# Patient Record
Sex: MALE | Race: BLACK OR AFRICAN AMERICAN | NOT HISPANIC OR LATINO | Employment: OTHER | URBAN - METROPOLITAN AREA
[De-identification: names, ages, dates, MRNs, and addresses within clinical notes are randomized per-mention and may not be internally consistent; named-entity substitution may affect disease eponyms.]

---

## 2023-08-17 ENCOUNTER — HOSPITAL ENCOUNTER (OUTPATIENT)
Dept: DATA CONVERSION | Facility: HOSPITAL | Age: 78
End: 2023-08-18
Attending: INTERNAL MEDICINE | Admitting: INTERNAL MEDICINE

## 2023-08-17 ENCOUNTER — APPOINTMENT (OUTPATIENT)
Dept: LAB | Facility: LAB | Age: 78
End: 2023-08-17
Payer: MEDICARE

## 2023-08-17 DIAGNOSIS — F10.90 ALCOHOL USE, UNSPECIFIED, UNCOMPLICATED: ICD-10-CM

## 2023-08-17 DIAGNOSIS — N40.0 BENIGN PROSTATIC HYPERPLASIA WITHOUT LOWER URINARY TRACT SYMPTOMS: ICD-10-CM

## 2023-08-17 DIAGNOSIS — N18.9 CHRONIC KIDNEY DISEASE, UNSPECIFIED: ICD-10-CM

## 2023-08-17 DIAGNOSIS — D63.1 ANEMIA IN CHRONIC KIDNEY DISEASE (CODE): ICD-10-CM

## 2023-08-17 DIAGNOSIS — C34.12 MALIGNANT NEOPLASM OF UPPER LOBE, LEFT BRONCHUS OR LUNG (MULTI): ICD-10-CM

## 2023-08-17 DIAGNOSIS — J44.9 CHRONIC OBSTRUCTIVE PULMONARY DISEASE, UNSPECIFIED (MULTI): ICD-10-CM

## 2023-08-17 DIAGNOSIS — I12.9 HYPERTENSIVE CHRONIC KIDNEY DISEASE WITH STAGE 1 THROUGH STAGE 4 CHRONIC KIDNEY DISEASE, OR UNSPECIFIED CHRONIC KIDNEY DISEASE: ICD-10-CM

## 2023-08-17 DIAGNOSIS — F14.90 COCAINE USE, UNSPECIFIED, UNCOMPLICATED: ICD-10-CM

## 2023-08-17 DIAGNOSIS — R91.8 OTHER NONSPECIFIC ABNORMAL FINDING OF LUNG FIELD: ICD-10-CM

## 2023-08-17 DIAGNOSIS — Z72.0 TOBACCO USE: ICD-10-CM

## 2023-08-17 DIAGNOSIS — D50.9 IRON DEFICIENCY ANEMIA, UNSPECIFIED: ICD-10-CM

## 2023-08-17 LAB
ANION GAP IN SER/PLAS: 10 MMOL/L (ref 10–20)
BASOPHILS (10*3/UL) IN BLOOD BY MANUAL COUNT - WAM: 0.23 X10E9/L (ref 0–0.1)
BASOPHILS/100 LEUKOCYTES IN BLOOD BY MANUAL COUNT - WAM: 3.5 % (ref 0–2)
CALCIUM (MG/DL) IN SER/PLAS: 8.5 MG/DL (ref 8.6–10.6)
CARBON DIOXIDE, TOTAL (MMOL/L) IN SER/PLAS: 27 MMOL/L (ref 21–32)
CHLORIDE (MMOL/L) IN SER/PLAS: 109 MMOL/L (ref 98–107)
CREATININE (MG/DL) IN SER/PLAS: 1.67 MG/DL (ref 0.5–1.3)
EOSINOPHILS (10*3/UL) IN BLOOD BY MANUAL COUNT - WAM: 0.23 X10E9/L (ref 0–0.4)
EOSINOPHILS/100 LEUKOCYTES IN BLOOD BY MANUAL COUNT - WAM: 3.5 % (ref 0–6)
ERYTHROCYTE DISTRIBUTION WIDTH (RATIO) BY AUTOMATED COUNT: 24.3 % (ref 11.5–14.5)
ERYTHROCYTE MEAN CORPUSCULAR HEMOGLOBIN CONCENTRATION (G/DL) BY AUTOMATED: 27.2 G/DL (ref 32–36)
ERYTHROCYTE MEAN CORPUSCULAR VOLUME (FL) BY AUTOMATED COUNT: 69 FL (ref 80–100)
ERYTHROCYTES (10*6/UL) IN BLOOD BY AUTOMATED COUNT: 3.8 X10E12/L (ref 4.5–5.9)
GFR MALE: 42 ML/MIN/1.73M2
GLUCOSE (MG/DL) IN SER/PLAS: 84 MG/DL (ref 74–99)
HEMATOCRIT (%) IN BLOOD BY AUTOMATED COUNT: 26.1 % (ref 41–52)
HEMOGLOBIN (G/DL) IN BLOOD: 7.1 G/DL (ref 13.5–17.5)
HYPOCHROMIA (PRESENCE) IN BLOOD BY LIGHT MICROSCOPY: NORMAL
IMMATURE GRANULOCYTES/100 LEUKOCYTES IN BLOOD BY AUTOMATED COUNT: 0.3 % (ref 0–0.9)
INR IN PPP BY COAGULATION ASSAY: 1.1 (ref 0.9–1.1)
LEUKOCYTES (10*3/UL) IN BLOOD BY AUTOMATED COUNT: 6.5 X10E9/L (ref 4.4–11.3)
LYMPHOCYTES (10*3/UL) IN BLOOD BY MANUAL COUNT - WAM: 0.68 X10E9/L (ref 0.8–3)
LYMPHOCYTES/100 LEUKOCYTES IN BLOOD BY MANUAL COUNT - WAM: 10.5 % (ref 13–44)
MANUAL DIFFERENTIAL Y/N: ABNORMAL
MONOCYTES (10*3/UL) IN BLOOD BY MANUAL COUNT - WAM: 0.34 X10E9/L (ref 0.05–0.8)
MONOCYTES/100 LEUKOCYTES IN BLOOD BY MANUAL COUNT - WAM: 5.3 % (ref 2–10)
NEUTROPHILS (SEGS+BANDS) (10*3/UL) MANUAL COUNT - WAM: 5.02 X10E9/L (ref 1.6–5.5)
NRBC (PER 100 WBCS) BY AUTOMATED COUNT: 0 /100 WBC (ref 0–0)
PLATELETS (10*3/UL) IN BLOOD AUTOMATED COUNT: 461 X10E9/L (ref 150–450)
POCT GLUCOSE: 62 MG/DL (ref 74–99)
POCT GLUCOSE: 94 MG/DL (ref 74–99)
POLYCHROMASIA IN BLOOD BY LIGHT MICROSCOPY: NORMAL
POTASSIUM (MMOL/L) IN SER/PLAS: 4.5 MMOL/L (ref 3.5–5.3)
PROTHROMBIN TIME (PT) IN PPP BY COAGULATION ASSAY: 12.8 SEC (ref 9.8–12.8)
RBC MORPHOLOGY IN BLOOD: NORMAL
SCHISTOCYTES (PRESENCE) IN BLOOD BY LIGHT MICROSCOPY: NORMAL
SEGMENTED NEUTROPHILS (10*3/UL) BLOOD MANUAL - WAM: 5.02 X10E9/L (ref 1.6–5)
SEGMENTED NEUTROPHILS/100 LEUKOCYTES BY MANUAL COUNT -: 77.2 % (ref 40–80)
SODIUM (MMOL/L) IN SER/PLAS: 141 MMOL/L (ref 136–145)
TARGET CELLS IN BLOOD BY LIGHT MICROSCOPY: NORMAL
UREA NITROGEN (MG/DL) IN SER/PLAS: 17 MG/DL (ref 6–23)

## 2023-08-18 LAB
ABO GROUP (TYPE) IN BLOOD: NORMAL
ALBUMIN (G/DL) IN SER/PLAS: 2.9 G/DL (ref 3.4–5)
ANION GAP IN SER/PLAS: 13 MMOL/L (ref 10–20)
ANTIBODY SCREEN: NORMAL
BASOPHILS (10*3/UL) IN BLOOD BY AUTOMATED COUNT: 0.07 X10E9/L (ref 0–0.1)
BASOPHILS/100 LEUKOCYTES IN BLOOD BY AUTOMATED COUNT: 0.8 % (ref 0–2)
CALCIUM (MG/DL) IN SER/PLAS: 8.7 MG/DL (ref 8.6–10.6)
CARBON DIOXIDE, TOTAL (MMOL/L) IN SER/PLAS: 24 MMOL/L (ref 21–32)
CHLORIDE (MMOL/L) IN SER/PLAS: 107 MMOL/L (ref 98–107)
CREATININE (MG/DL) IN SER/PLAS: 1.69 MG/DL (ref 0.5–1.3)
EOSINOPHILS (10*3/UL) IN BLOOD BY AUTOMATED COUNT: 0.14 X10E9/L (ref 0–0.4)
EOSINOPHILS/100 LEUKOCYTES IN BLOOD BY AUTOMATED COUNT: 1.6 % (ref 0–6)
ERYTHROCYTE DISTRIBUTION WIDTH (RATIO) BY AUTOMATED COUNT: 24.3 % (ref 11.5–14.5)
ERYTHROCYTE MEAN CORPUSCULAR HEMOGLOBIN CONCENTRATION (G/DL) BY AUTOMATED: 27.2 G/DL (ref 32–36)
ERYTHROCYTE MEAN CORPUSCULAR VOLUME (FL) BY AUTOMATED COUNT: 67 FL (ref 80–100)
ERYTHROCYTES (10*6/UL) IN BLOOD BY AUTOMATED COUNT: 3.95 X10E12/L (ref 4.5–5.9)
GFR MALE: 42 ML/MIN/1.73M2
GLUCOSE (MG/DL) IN SER/PLAS: 126 MG/DL (ref 74–99)
HEMATOCRIT (%) IN BLOOD BY AUTOMATED COUNT: 26.5 % (ref 41–52)
HEMOGLOBIN (G/DL) IN BLOOD: 7.2 G/DL (ref 13.5–17.5)
HYPOCHROMIA (PRESENCE) IN BLOOD BY LIGHT MICROSCOPY: NORMAL
IMMATURE GRANULOCYTES/100 LEUKOCYTES IN BLOOD BY AUTOMATED COUNT: 0.3 % (ref 0–0.9)
LEUKOCYTES (10*3/UL) IN BLOOD BY AUTOMATED COUNT: 8.8 X10E9/L (ref 4.4–11.3)
LYMPHOCYTES (10*3/UL) IN BLOOD BY AUTOMATED COUNT: 0.77 X10E9/L (ref 0.8–3)
LYMPHOCYTES/100 LEUKOCYTES IN BLOOD BY AUTOMATED COUNT: 8.8 % (ref 13–44)
MAGNESIUM (MG/DL) IN SER/PLAS: 1.94 MG/DL (ref 1.6–2.4)
MONOCYTES (10*3/UL) IN BLOOD BY AUTOMATED COUNT: 0.6 X10E9/L (ref 0.05–0.8)
MONOCYTES/100 LEUKOCYTES IN BLOOD BY AUTOMATED COUNT: 6.9 % (ref 2–10)
NEUTROPHILS (10*3/UL) IN BLOOD BY AUTOMATED COUNT: 7.14 X10E9/L (ref 1.6–5.5)
NEUTROPHILS/100 LEUKOCYTES IN BLOOD BY AUTOMATED COUNT: 81.6 % (ref 40–80)
NRBC (PER 100 WBCS) BY AUTOMATED COUNT: 0 /100 WBC (ref 0–0)
PHOSPHATE (MG/DL) IN SER/PLAS: 3.1 MG/DL (ref 2.5–4.9)
PLATELETS (10*3/UL) IN BLOOD AUTOMATED COUNT: 455 X10E9/L (ref 150–450)
POTASSIUM (MMOL/L) IN SER/PLAS: 4 MMOL/L (ref 3.5–5.3)
RBC MORPHOLOGY IN BLOOD: NORMAL
RH FACTOR: NORMAL
SCHISTOCYTES (PRESENCE) IN BLOOD BY LIGHT MICROSCOPY: NORMAL
SODIUM (MMOL/L) IN SER/PLAS: 140 MMOL/L (ref 136–145)
TARGET CELLS IN BLOOD BY LIGHT MICROSCOPY: NORMAL
UREA NITROGEN (MG/DL) IN SER/PLAS: 18 MG/DL (ref 6–23)

## 2023-08-22 LAB
COMPLETE PATHOLOGY REPORT: NORMAL
COMPLETE PATHOLOGY REPORT: NORMAL
CONVERTED ADDENDUM DIAGNOSIS 2: NORMAL
CONVERTED ADDENDUM DIAGNOSIS 2: NORMAL
CONVERTED CLINICAL DIAGNOSIS-HISTORY: NORMAL
CONVERTED CLINICAL DIAGNOSIS-HISTORY: NORMAL
CONVERTED DIAGNOSIS COMMENT: NORMAL
CONVERTED FINAL DIAGNOSIS: NORMAL
CONVERTED FINAL DIAGNOSIS: NORMAL
CONVERTED FINAL REPORT PDF LINK TO COPY AND PASTE: NORMAL
CONVERTED FINAL REPORT PDF LINK TO COPY AND PASTE: NORMAL
CONVERTED GROSS DESCRIPTION: NORMAL
CONVERTED RAPID EVALUATION: NORMAL
CONVERTED SPECIMEN DESCRIPTION: NORMAL

## 2023-09-29 VITALS — BODY MASS INDEX: 22.68 KG/M2 | WEIGHT: 141.09 LBS | HEIGHT: 66 IN

## 2023-09-30 NOTE — H&P
History of Present Illness:   HPI:    Mr. Aquino is a 76 yo M with a h/o left upper lobe mass,  PE, COPD, CKD, duodenal AV malformation, GI bleed, polysubstance use disorder (etoh, tobacco, cocaine), JOSÉ MIGUEL, HTN, BPH who is  admitted post EBUS for observation, repeat labs, and social work consult . Pt presented to ED in July 2023 for abdominal pain and several weeks of dyspnea on exertion with intermittent wheezing. CT Chest done at the time showed a MESHA mass, and patient underwent CT guided  biopsy which showed small portion of fibroelastotic nodule with focal necrosis and chronic inflammation, admixed with blood. Pt had an EBUS today given concern for malignancy.    On exam pt  mostly complaining of being hungry stating he hasn't eaten in a few days. When asked about this he stated he doesn't have any food. Pt agreeable to social work consult. He denies fevers, chills, weight loss, dyspnea, cp, cough, headache, lightheadedness,  n/v, abd pain, constipation, diarrhea, dysuria, joint pain. He is not aware of any of his past medical history and doesn't follow with any PCP or take any medications. Pt is aware that there is something in his chest that needed to be analyzed but that  seems to be the extent of his understanding. He did not appear altered and was AOx3.    PMH:  Pt unaware of any medical history  PMH in HPI taken from EMR    PSH:  None    Fam Hx:  None that pt is aware of    Medications:   Pt reports not taking any medications except inhaler  Per EMR patient on:  Ferrous sulfate 325 qd  Albuterol 90mcg  q6h  Flomax 0.4mg po qd  amlodipine 5mg qd  pantoprazole 40mg qd      Allergies:  NKDA    Social:  - Currently smokes a few cigarettes/day, occasional cocaine use (last used a week ago), denies alcohol use  - Lives with cousins,  nephew      On Admission:  T 36.4   /84  HR  96   RR 17    O2 95% on RA   Labs  CBC: WBC 6.5, Hgb 7.1, plt 461  RFP: Na 141, K 4.5, Cl 109, HCO3 27, BUN 17, Cr 1.67,  gluc 84, Ca 8.5    Comorbidities:   Comorbidites:  ·  Comorbid Conditions hypertension            Allergies:  ·  No Known Allergies :     Medications Prior to Admission:   No Home Meds have been entered for reconciliation yet.    Objective:     Objective Information:        Pain reported at 8/17 14:56: 0 = None    Physical Exam Narrative:  ·  Physical Exam:    General: Sitting on side of bed holding cane, making grunting sounds  HEENT: Normocephalic, atraumatic, EOMI grossly  Respiratory: Coarse breath sounds on expiration with some expiratory wheezing, normal work of breathing on RA  CV: RRR, no m/r/g  Abdominal: Soft, non-tender, non-distended, +BS  Extremities: No peripheral edema b/l, warm and well perfused  NEURO: AOx3, no tremors noted  PSYCH: Appropriate mood and affect      Recent Lab Results:    Results:    CBC: 8/17/2023 08:22              \     Hgb     /                              \     7.1 L    /  WBC  ----------------  Plt               6.5       ----------------    461 H            /     Hct     \                              /     26.1 L    \            RBC: 3.80 L    MCV: 69 L          BMP: 8/17/2023 08:22  NA+        Cl-     BUN  /                         141    109 H   17  /  --------------------------------  Glucose                ---------------------------  84    K+     HCO3-   Creat \                         4.5  27    1.67 H \  Calcium : 8.5 L    Anion Gap : 10      Coagulation: 8/17/2023 08:22  PT  /                    12.8  /  -------<    INR          ----------<      1.1  PTT\                              \                         I have reviewed these laboratory results:    RBC Morphology  17-Aug-2023 08:22:00      Result Value    Red Blood Cell Morphology  See Below    Polychromasia  Few    Hypochromasia  Few    Red Blood Cell Fragments  Few    Target Cells  Few      Manual Differential Panel  17-Aug-2023 08:22:00      Result Value    % Seg Neutrophil  77.2    % Lymphocyte  10.5     % Monocyte  5.3    % Eosinophil  3.5    % Basophil  3.5    Absolute Neutrophil Count (ANC)  5.02    Seg Neutrophil Count  5.02   H   Lymphocyte, Count  0.68   L   Monocyte, Count  0.34    Eosinophil, Count  0.23    Basophil, Count  0.23   H       Radiology Results:    Results:        Impression:    1. No evidence of acute pulmonary embolism. Rounded masslike soft  tissue in the left upper lobe in the lingular segment measuring 9 x  7.1 cm with hypodensity seen centrally. This has some intrinsic mass  effect on the adjacent pulmonary artery. Findings concerning for  malignancy. PET-CT and pulmonary consultation advised. Necrotic  pneumonia is felt to be less likely. Vascular calcifications detected  2. Slight emphysema.      TH CT Angio Chest for PE [Jul 2 2023  8:45PM]      Assessment and Plan:   Assessment:    Mr. Aquino is a  76 yo M with a h/o left upper lobe mass, PE, COPD,  CKD, duodenal AV malformation, GI bleed, polysubstance use disorder (etoh, tobacco, cocaine), JOSÉ MIGUEL, HTN, BPH who is admitted post  EBUS for observation, repeat labs, and social work consult.    #Left lung mass  :: CT A Chest (7/2/23): Rounded masslike soft tissue in MESHA lingular segment- 9 x7.1cm with hypodensity with intrinsic mass effect on adjacent  PA, slight emphysema  :: S/p EBUS on 8/17: Completely obstructing airway abnormality in the superior lingular segment of the left upper lobe (B4) and in the inferior lingular segment of the left upper lobe, Extrinsic compression of the left lower  lobe, this was traversable with a  bronchoscope, the distal segments of the left lower lobe airways were able to be visualized. Preliminary cytology was suggestive suggestive of benign-appearing lymphoid tissue in node level 7 and suggestive of malignancy  in left upper lobe mass (final results are pending)  :: Pulmonologist- Dr. Mora  - F/u outpatient with pulm re biopsy results  - Continue albuterol    #COPD  :: Pt on albuterol at  home  - Continue albuterol  #CKD  :: Baseline Cr 2, currently 1.67  - F/u AM RFP    #Iron deficiency anemia  :: Hgb 7.1, MCV 69  :: Consent for blood transfusion in chart  :: Iron studies (July 2023): Fe 11, TIBC 369, %sat 3, ferritin 15 -consistent with JOSÉ MIGUEL  - Complete T&S  - F/u AM CBC, transfuse for hgb  <7    #HTN   :: Per EMR on amlodipine 5mg po daily at home  - Continue amlodipine 5mg qd    #BPH   :: Per EMR on Flomax 0.4mg qd  - Continue Flomax 0.4mg qd    #Polysubstance use disorder  :: H/o cocaine, etoh, tobacco use. Last used cocaine  a week ago, pt denies alcohol use  - F/u utox, serum tox   - Pt does not appear to be in withdrawal-denies anxiety, palpitations,  n/v, visual changes, no tremors. Will initiate CIWA if this changes    #Social  :: Pt here for bronch, did not have ride home so was admitted  - Social work consult        Diet: Renal  DVT PPX: ambulation, SCD, holding AC   GI ppx: pantoprazole  Code: Full      Will be staffed with attending in the AM.  Felicity Vargas MD  PGY-1, Internal Medicine          Attestation:   Note Completion:  I am a:  Resident/Fellow   Attending Attestation I saw and evaluated the patient.  I personally obtained the key and critical portions of the history and physical exam or was physically present for key and  critical portions performed by the resident/fellow. I reviewed the resident/fellow?s documentation and discussed the patient with the resident/fellow.  I agree with the resident/fellow?s medical decision making as documented in the note.     I personally evaluated the patient on 18-Aug-2023   Comments/ Additional Findings    Patient seen on 8/18. Will start stiolto for moderate COPD on old PFTs. Will need OP follow up with PCP for BP management and pulm vs PCP for COPD.   Interventional pulm to call with final results of biopsy. Appreciate social work consult today.          Electronic Signatures:  Felicity Dennis (MD (Resident))  (Signed  18-Aug-2023 10:45)   Authored: History of Present Illness, Comorbidities,  Allergies, Medications Prior to Admission, Objective, Assessment and Plan, Note Completion  Katie Nettles)  (Signed 18-Aug-2023 12:03)   Authored: Note Completion   Co-Signer: Assessment and Plan, Note Completion      Last Updated: 18-Aug-2023 12:03 by Katie Nettles)

## 2023-09-30 NOTE — DISCHARGE SUMMARY
Send Summary:   Discharge Summary Providers:  Provider Role Provider Name   · Referring Unknown, Pcp   · Attending Katie Nettles   · Primary Unknown, Pcp       Note Recipients: Uriah Ricardo MD       Discharge:    Summary:   Admission Date: .17-Aug-2023 08:27:00   Discharge Date: 18-Aug-2023   Attending Physician at Discharge: Katie Nettles   Admission Reason: Post-bronchoscopy, lung mass   Final Discharge Diagnoses: Lung mass   Procedures: Bronchoscopy Aug 17, 2023   Condition at Discharge: Fair   Disposition at Discharge: .Home   Vital Signs:        T   P  R  BP   MAP  SpO2   Value  37.1  104  16  135/65   88  100%  Date/Time 8/18 14:03 8/18 14:03 8/18 14:03 8/18 14:03  8/18 14:03 8/18 14:03  Range  (36.4C - 37.1C )  (83 - 104 )  (16 - 18 )  (135 - 168 )/ (65 - 86 )  (88 - 106 )  (95% - 100% )  Highest temp of 37.1 C was recorded at 8/18 14:03    Date:            Weight/Scale Type:  Height:   17-Aug-2023 17:32  64  kg / bed  167.5  cm  Physical Exam:    General: Laying in bed, NAD  HEENT: Normocephalic, atraumatic, EOMI grossly  Respiratory: Coarse breath sounds on expiration with some inspiratory and expiratory wheezing, normal work of breathing on RA  CV: RRR, no m/r/g  Abdominal: Soft, non-tender, non-distended, +BS  Extremities: R forearm swollen, mildly tender to touch, no erythema or ecchymoses. No peripheral edema in LE b/l, warm and well perfused  NEURO: AOx3, no tremors noted  PSYCH: Appropriate mood and affect  Hospital Course:    Mr. Aquino is a 76 yo M with a h/o left upper lobe mass, PE, COPD, CKD, duodenal AV malformation, GI bleed, polysubstance use disorder (etoh, tobacco, cocaine),  JOSÉ MIGUEL, HTN, BPH who was admitted post EBUS for observation overnight, repeat labs, and social work consult. Pt presented to ED in July 2023 for abdominal pain and several weeks of dyspnea on exertion with intermittent wheezing. CT Chest done at the time  showed a MESHA mass, and patient underwent CT  guided biopsy which showed small portion of fibroelastotic nodule with focal necrosis and chronic inflammation, admixed with blood. Pt had an EBUS 8/17 given concern for malignancy. Pt remained HDS, with stable  labs overnight and was discharged the day after EBUS with biopsy. Attempted to establish home health care for patient for medication compliance and social work, but he he does not follow with a PCP, so no one available to manage home care orders.     Follow up:  - Primary care doctor after one week from discharge - appointment requested  - Dr. Ricardo after 2 weeks from discharge to follow up with the lung mass biopsy     -New med: Stiolto once a day       Discharge Information:    and Continuing Care:   Lab Results - Pending:    Surgical Pathology Drawn at 17-Aug-2023 13:00:00  Cytology-Non GYN Drawn at 17-Aug-2023 00:00:00  Radiology Results - Pending: None   Discharge Instructions:    Activity:           activity as tolerated.    Nutrition/Diet:           resume normal diet    Additional Orders:           Additional Instructions:   Dear Mr. Aquino,     you have been admitted for a bronchoscopy procedure for a biopsy of the left lung mass that was diagnosed on previous imaging. The procedure went successfully w/o complications, a biopsy was collected and the result is still pending. Once the result of  tyhe biopsy is ready, you will be contacted by our staff to update you regarding the biopsy result and the next step you need to take for management and follow up.  Since you have COPD, we added a new inhaler to your regimen, Called Stiolto which you  should use every day giving yourself two puffs. You remained stable during the hospital course and you will be discharged home today.     Unfortunately, your shoes were misplaced during your stay here at the hospital. The patient advocate has been contacted regarding reimbursement. Their number is [phone] if you would like to speak to the patient  advicate yourself.     After discharge instructions:     -Continue using your home albuterol as needed   -Start using the new inhaler, Stiolto once a day (giving yourself 2 puffs)     Appointment after discharge:   -Follow up with your primary care doctor after one week from discharge   -Follow up with your pulmonologist after 2 weeks from discharge to follow up with the lung mass biopsy       It was a pleasure looking after you. take care !     Home Care Services:           Home Care Skilled Service:   medication,  new diagnosis teaching,  social work: support and transport    Follow Up Appointments:    Follow-Up Appointment 01:           Physician/Dept/Service:   Primary care doctor          Call to Schedule in:   1 week    Follow-Up Appointment 02:           Physician/Dept/Service:   Pulmonology - Dr Vinay Simmons          Call to Schedule in:   2 weeks          Location:   [facility]    Discharge Medications: Home Medication   Flomax 0.4 mg oral capsule - 1 cap(s) orally once a day (at bedtime) -.Meds to Beds   Discharge pending.   albuterol 90 mcg/inh inhalation aerosol - 2  inhaled every 6 hours -.Meds to Beds  as need for SOB.   Discharge pending.   ferrous sulfate 325 mg (65 mg elemental iron) oral tablet - 1 tab(s) orally once a day -.Meds to Beds   Protonix 40 mg oral delayed release tablet - 1 tab(s) orally once a day  fixed wheeled walker  - Diagnosis: COPD   iagnosis code J44.99    height: 170 cm   weight: 70 KG.   tiotropium-olodaterol 2.5 mcg-2.5 mcg/inh inhalation aerosol - 2 puff(s) inhaled once a day     Meds to beds   [facility] 55 RM [id number]   [facility] 8/18      PRN Medication   naloxone - 0.2 milligram(s)  once, As needed, If patient RR below 10, obtunded or unarousable - to IntraVenous Push     DNR Status:   ·  Code Status Code Status order at time of discharge: Full Code     Attestation:   Note Completion:  I am a:  Resident/Fellow   Attending Attestation I saw and evaluated the patient.  I personally  obtained the key and critical portions of the history and physical exam or was physically present for key and  critical portions performed by the resident/fellow. I reviewed the resident/fellow?s documentation and discussed the patient with the resident/fellow.  I agree with the resident/fellow?s medical decision making as documented in the note.     I personally evaluated the patient on 18-Aug-2023         Electronic Signatures:  Lynne Almaguer (Resident))  (Signed 18-Aug-2023 16:53)   Authored: Send Summary, Summary Content, Ongoing Care,  DNR Status, Note Completion  Katie Nettles)  (Signed 19-Aug-2023 12:28)   Authored: Note Completion   Co-Signer: Send Summary, Summary Content, Ongoing Care, DNR Status, Note Completion      Last Updated: 19-Aug-2023 12:28 by Katie Nettles)

## 2023-09-30 NOTE — PROGRESS NOTES
Service: Pulmonology     Subjective Data:   SYBIL AQUINO is a 77 year old Male who is Hospital Day # 2.    Additional Information:    Overnight Mr. Aquino's R PIV infiltrated, forearm now swollen and slightly tender to touch. Reports doing well this AM. Concerned about missing shoes, last seen  when changing for bronch. Pt denies headache, dyspnea, n/v, cough, cp.        Objective Data:     Objective Information:      T   P  R  BP   MAP  SpO2   Value  36.6  83  17  142/86   105  100%  Date/Time 8/18 5:22 8/18 5:22 8/18 5:22 8/18 5:22 8/18 5:22 8/18 5:22  Range  (36.4C - 36.6C )  (83 - 98 )  (17 - 18 )  (142 - 168 )/ (81 - 86 )  (105 - 106 )  (95% - 100% )      Pain reported at 8/18 0:24: 0 = None    Physical Exam Narrative:  ·  Physical Exam:    General: Laying in bed, NAD  HEENT: Normocephalic, atraumatic, EOMI grossly  Respiratory: Coarse breath sounds on expiration with some inspiratory and expiratory wheezing, normal work of breathing on RA  CV: RRR, no m/r/g  Abdominal: Soft, non-tender, non-distended, +BS  Extremities: R forearm swollen, mildly tender to touch, no erythema or ecchymoses. No peripheral edema in LE b/l, warm and well perfused  NEURO: AOx3, no tremors noted  PSYCH: Appropriate mood and affect    Recent Lab Results:    Results:        I have reviewed these laboratory results:    Complete Blood Count + Differential  18-Aug-2023 06:04:00      Result Value    White Blood Cell Count  8.8    Nucleated Erythrocyte Count  0.0    Red Blood Cell Count  3.95   L   HGB  7.2   L   HCT  26.5   L   MCV  67   L   MCHC  27.2   L   PLT  455   H   RDW-CV  24.3   H   Neutrophil %  81.6    Immature Granulocytes %  0.3    Lymphocyte %  8.8    Monocyte %  6.9    Eosinophil %  1.6    Basophil %  0.8    Neutrophil Count  7.14   H   Lymphocyte Count  0.77   L   Monocyte Count  0.60    Eosinophil Count  0.14    Basophil Count  0.07      Renal Function Panel  18-Aug-2023 06:04:00      Result Value    Glucose,   call to report that pt is being reviewed by [place], possibly [place].  Will discuss further DEC assessment need with nurse manager.  Pt sitting calm and quiet in room at this time. Frequently asking what is going on and what we have found out.  Pt found without oxygen frequently, needing assistance to re place O2.   Serum  126   H   NA  140    K  4.0    CL  107    Bicarbonate, Serum  24    Anion Gap, Serum  13    BUN  18    CREAT  1.69   H   GFR Male  42   A   Calcium, Serum  8.7    Phosphorus, Serum  3.1    ALB  2.9   L     RBC Morphology  18-Aug-2023 06:04:00      Result Value    Red Blood Cell Morphology  See Below    Hypochromasia  Marked    Red Blood Cell Fragments  Few    Target Cells  Few      Magnesium, Serum  18-Aug-2023 06:04:00      Result Value    Magnesium, Serum  1.94        Assessment and Plan:   Comorbidities:  ·  Comorbidity anemia   ·  Anemia likely JOSÉ MIGUEL - not yet characterized     Code Status:  ·  Code Status Full Code     Assessment:    Mr. Aquino is a  76 yo M with a h/o left upper lobe mass, PE, COPD,  CKD, duodenal AV malformation, GI bleed, polysubstance use disorder (etoh, tobacco, cocaine), JOSÉ MIGUEL, HTN, BPH who is admitted post  EBUS for observation, repeat labs, and social work consult.    Updates 8/18:  - AM Hgb stable at 7.2, no transfusion indicated  - Social work consult  - Plan for discharge today    #Left lung mass  :: CT A Chest (7/2/23): Rounded masslike soft tissue in MESHA lingular segment- 9 x7.1cm with hypodensity with intrinsic mass effect on adjacent  PA, slight emphysema  :: CT guided lung biopsy (7/3):  fibroelastotic nodule with focal necrosis and chronic inflammation, admixed with blood  :: S/p EBUS on 8/17: Completely obstructing airway abnormality  in the superior lingular segment of the left upper lobe (B4) and in the inferior lingular segment of the left upper lobe, Extrinsic compression of the left lower lobe, this was traversable with a  bronchoscope, the distal segments of the left lower  lobe airways were able to be visualized. Preliminary cytology was suggestive suggestive of benign-appearing lymphoid tissue in node level 7 and suggestive of malignancy in left upper lobe mass (final results are pending)  :: Pulmonologist- Dr. Mora   - F/u outpatient with pulm re biopsy  results  - Continue albuterol    #Asthma/COPD  :: Pt on albuterol at home  - Start Stiolto    #CKD  :: Baseline Cr 2, currently 1.69  - GFR 42    #Iron deficiency anemia  :: Hgb 7.1, MCV 69  :: Consent for blood transfusion in chart  :: Iron studies (July 2023): Fe 11, TIBC 369, %sat 3, ferritin 15 -consistent with JOSÉ MIGUEL  - Complete T&S   - Hgb stable at 7.2    #HTN   :: Per EMR on amlodipine 5mg po daily at home  - Continue amlodipine 5mg qd    #BPH   :: Per EMR on Flomax 0.4mg qd  - Continue Flomax 0.4mg qd    #Polysubstance use disorder  :: H/o cocaine, etoh, tobacco use. Last used cocaine  a week ago, pt denies alcohol use  - Declined nicotine patch  - F/u utox, serum tox   - Pt does not appear to be in withdrawal-denies  anxiety, palpitations, n/v, visual changes, no tremors. Will initiate CIWA if this changes    #Social  :: Pt here for bronch, did not have ride home so was admitted  - Social work consult      Diet: Renal  DVT PPX: ambulation, SCD, holding AC   GI ppx: pantoprazole  Code: Full       Patient seen and discussed with attending physician.   Felicity Vargas MD  PGY-1, Internal Medicine      Attestation:   Note Completion:  I am a:  Resident/Fellow   Attending Attestation I saw and evaluated the patient.  I personally obtained the key and critical portions of the history and physical exam or was physically present for key and  critical portions performed by the resident/fellow. I reviewed the resident/fellow?s documentation and discussed the patient with the resident/fellow.  I agree with the resident/fellow?s medical decision making as documented in the note.     I personally evaluated the patient on 18-Aug-2023   Comments/ Additional Findings    Start stiolto and continue albuterol for moderate COPD. OP PCP - attestation in \[facility] [place] [facility]\"".          Electronic Signatures:  Felicity Dennis (Resident))  (Signed 18-Aug-2023 10:44)   Authored: Service, Subjective Data, Objective Data, Assessment  and  Plan, Note Completion  Katie Nettles)  (Signed 18-Aug-2023 12:06)   Authored: Assessment and Plan, Note Completion   Co-Signer: Subjective Data, Objective Data, Assessment and Plan, Note Completion      Last Updated: 18-Aug-2023 12:06 by Katie Nettles)

## 2023-10-04 ENCOUNTER — SOCIAL WORK (OUTPATIENT)
Dept: CASE MANAGEMENT | Facility: HOSPITAL | Age: 78
End: 2023-10-04
Payer: MEDICARE

## 2023-10-04 DIAGNOSIS — C34.92 NSCLC OF LEFT LUNG (MULTI): Primary | ICD-10-CM

## 2023-10-04 NOTE — PROGRESS NOTES
"This SW rec'd call from pt on 10/03/23 asking to r/s appt - \"no one came for me\" last week. SW explained that we had asked pt to confirm appt and did not hear from him, therefore did not schedule ride.  SW offered appt times for today and next Weds. This SW confirmed 11 am appt 10/11/23 with 10:30 approximate  time. SW asked that he call this SW if any reason he cannot attend, and provided contact number.  SW informed team of appt request and transportation which has been set up in [employer].    "

## 2023-10-06 ENCOUNTER — SOCIAL WORK (OUTPATIENT)
Dept: CASE MANAGEMENT | Facility: HOSPITAL | Age: 78
End: 2023-10-06
Payer: MEDICARE

## 2023-10-06 NOTE — PROGRESS NOTES
"This SW was advised this date that pt's health ins changed effective 10/01/23 and when financial counselor called him to notify, he became very upset and said he had no idea that was happening; the new ins d/n provide in network coverage for this hospital. This SW then spoke with pt who was unable to answer SW's questions, repeating \"I don't know.\" SW advised that SW will try to see the best way to correct this.   Pt is currently scheduled for med onc intake 10/11/23.   This SW has made several calls to ins co, agent, [employer]. Unless there is an emergency exclusion, it is expected that pt can switch his Medicare Advantage plan effective 11/01/23. He may be eligible for Medicaid as well, but that would take about 6-8 weeks to complete. SW expecting call back from [employer] supervisor today or 10/10 (due to 10/09 holiday).   Will touch base with pt later today and Mon/Tues.    "

## 2023-10-10 ENCOUNTER — SOCIAL WORK (OUTPATIENT)
Dept: CASE MANAGEMENT | Facility: HOSPITAL | Age: 78
End: 2023-10-10
Payer: MEDICARE

## 2023-10-10 PROBLEM — Z86.711 PERSONAL HISTORY OF PULMONARY EMBOLISM: Status: ACTIVE | Noted: 2023-07-06

## 2023-10-10 PROBLEM — F14.90 COCAINE USE: Status: ACTIVE | Noted: 2021-09-09

## 2023-10-10 PROBLEM — N18.30 STAGE 3 CHRONIC KIDNEY DISEASE (MULTI): Status: ACTIVE | Noted: 2023-10-10

## 2023-10-10 PROBLEM — I10 ESSENTIAL (PRIMARY) HYPERTENSION: Status: ACTIVE | Noted: 2023-10-10

## 2023-10-10 PROBLEM — I10 PRIMARY HYPERTENSION: Status: ACTIVE | Noted: 2021-09-08

## 2023-10-10 PROBLEM — C34.90 SQUAMOUS CELL LUNG CANCER (MULTI): Status: ACTIVE | Noted: 2023-10-10

## 2023-10-10 PROBLEM — R03.0 ELEVATED BLOOD PRESSURE READING WITHOUT DIAGNOSIS OF HYPERTENSION: Status: ACTIVE | Noted: 2023-10-10

## 2023-10-10 PROBLEM — F14.10 COCAINE ABUSE (MULTI): Status: ACTIVE | Noted: 2023-07-06

## 2023-10-10 PROBLEM — D64.9 ANEMIA: Status: ACTIVE | Noted: 2023-10-10

## 2023-10-10 PROBLEM — Z79.51 LONG TERM (CURRENT) USE OF INHALED STEROIDS: Status: ACTIVE | Noted: 2023-07-06

## 2023-10-10 PROBLEM — J45.909 ASTHMA (HHS-HCC): Status: ACTIVE | Noted: 2023-10-10

## 2023-10-10 PROBLEM — K21.9 GASTRO-ESOPHAGEAL REFLUX DISEASE WITHOUT ESOPHAGITIS: Status: ACTIVE | Noted: 2023-10-10

## 2023-10-10 PROBLEM — K31.819 DUODENAL ARTERIOVENOUS MALFORMATION: Status: ACTIVE | Noted: 2022-11-14

## 2023-10-10 PROBLEM — F17.200 CURRENT SMOKER: Status: ACTIVE | Noted: 2019-03-20

## 2023-10-10 PROBLEM — G47.419 NARCOLEPSY WITHOUT CATAPLEXY (HHS-HCC): Status: ACTIVE | Noted: 2023-10-10

## 2023-10-10 PROBLEM — K92.0 HEMATEMESIS: Status: ACTIVE | Noted: 2022-11-14

## 2023-10-10 PROBLEM — R79.89 ELEVATED TROPONIN LEVEL: Status: ACTIVE | Noted: 2023-10-10

## 2023-10-10 PROBLEM — N40.0 BENIGN PROSTATIC HYPERPLASIA: Status: ACTIVE | Noted: 2023-07-06

## 2023-10-10 PROBLEM — N40.0 BPH (BENIGN PROSTATIC HYPERPLASIA): Status: ACTIVE | Noted: 2021-09-09

## 2023-10-10 PROBLEM — J85.0: Status: ACTIVE | Noted: 2023-07-06

## 2023-10-10 PROBLEM — I11.9 HYPERTENSIVE HEART DISEASE WITHOUT HEART FAILURE: Status: ACTIVE | Noted: 2023-07-06

## 2023-10-10 PROBLEM — D50.9 IRON DEFICIENCY ANEMIA: Status: ACTIVE | Noted: 2021-09-08

## 2023-10-10 PROBLEM — F19.10 POLYSUBSTANCE ABUSE (MULTI): Status: ACTIVE | Noted: 2023-10-10

## 2023-10-10 PROBLEM — N18.4 CHRONIC KIDNEY DISEASE, STAGE 4 (SEVERE) (MULTI): Status: ACTIVE | Noted: 2023-07-06

## 2023-10-10 PROBLEM — I10 HTN (HYPERTENSION): Status: ACTIVE | Noted: 2023-10-10

## 2023-10-10 PROBLEM — R91.8 MASS OF LEFT LUNG: Status: ACTIVE | Noted: 2023-10-10

## 2023-10-10 PROBLEM — J44.9 CHRONIC OBSTRUCTIVE PULMONARY DISEASE (MULTI): Status: ACTIVE | Noted: 2023-07-06

## 2023-10-10 PROBLEM — J45.909 UNCOMPLICATED ASTHMA (HHS-HCC): Status: ACTIVE | Noted: 2019-03-20

## 2023-10-10 PROBLEM — R91.8 LUNG MASS: Status: ACTIVE | Noted: 2023-10-10

## 2023-10-10 RX ORDER — FLUTICASONE PROPIONATE 110 UG/1
2 AEROSOL, METERED RESPIRATORY (INHALATION) 2 TIMES DAILY
COMMUNITY
Start: 2018-12-06 | End: 2023-12-10

## 2023-10-10 NOTE — PROGRESS NOTES
This SW received VM this am from Estefany Horton/[facility] [employer] [phone] who had submitted a 3 day appeal on pt's behalf, requesting that he be able to revert to his [employer] plan which was in network for this hosp. Ms. Horton reported that Medicare did approve reinstating pt's [employer] insurance to 10/01/23.   SW left VM for pt confirming this approval and his appt for 10/12 with ride  of 10:30 am, with request to be ready at least a few minutes early. Med onc team and [facility] financial counselor informed as well.  This SW to continue to follow.

## 2023-10-11 ENCOUNTER — SOCIAL WORK (OUTPATIENT)
Dept: CASE MANAGEMENT | Facility: HOSPITAL | Age: 78
End: 2023-10-11
Payer: MEDICARE

## 2023-11-13 ENCOUNTER — APPOINTMENT (OUTPATIENT)
Dept: HEMATOLOGY/ONCOLOGY | Facility: HOSPITAL | Age: 78
End: 2023-11-13
Payer: MEDICARE

## 2023-11-17 ENCOUNTER — APPOINTMENT (OUTPATIENT)
Dept: RADIOLOGY | Facility: HOSPITAL | Age: 78
DRG: 181 | End: 2023-11-17
Payer: MEDICARE

## 2023-11-17 ENCOUNTER — HOSPITAL ENCOUNTER (INPATIENT)
Facility: HOSPITAL | Age: 78
LOS: 12 days | Discharge: HOME | DRG: 181 | End: 2023-11-29
Attending: EMERGENCY MEDICINE | Admitting: STUDENT IN AN ORGANIZED HEALTH CARE EDUCATION/TRAINING PROGRAM
Payer: MEDICARE

## 2023-11-17 DIAGNOSIS — D64.9 ANEMIA: Primary | ICD-10-CM

## 2023-11-17 DIAGNOSIS — I82.90 THROMBOSIS: ICD-10-CM

## 2023-11-17 DIAGNOSIS — K92.0 HEMATEMESIS WITH NAUSEA: ICD-10-CM

## 2023-11-17 DIAGNOSIS — C34.92 SQUAMOUS CELL CARCINOMA OF LEFT LUNG (MULTI): ICD-10-CM

## 2023-11-17 LAB
ABO GROUP (TYPE) IN BLOOD: NORMAL
ACANTHOCYTES BLD QL SMEAR: NORMAL
ALBUMIN SERPL BCP-MCNC: 2.7 G/DL (ref 3.4–5)
ALP SERPL-CCNC: 35 U/L (ref 33–136)
ALT SERPL W P-5'-P-CCNC: 4 U/L (ref 10–52)
AMPHETAMINES UR QL SCN: ABNORMAL
ANION GAP BLDV CALCULATED.4IONS-SCNC: 11 MMOL/L (ref 10–25)
ANION GAP SERPL CALC-SCNC: 13 MMOL/L (ref 10–20)
ANTIBODY SCREEN: NORMAL
APPEARANCE UR: CLEAR
AST SERPL W P-5'-P-CCNC: 20 U/L (ref 9–39)
BARBITURATES UR QL SCN: ABNORMAL
BASE EXCESS BLDV CALC-SCNC: -0.8 MMOL/L (ref -2–3)
BASOPHILS # BLD AUTO: 0.05 X10*3/UL (ref 0–0.1)
BASOPHILS NFR BLD AUTO: 0.5 %
BENZODIAZ UR QL SCN: ABNORMAL
BILIRUB SERPL-MCNC: 0.4 MG/DL (ref 0–1.2)
BILIRUB UR STRIP.AUTO-MCNC: NEGATIVE MG/DL
BLOOD EXPIRATION DATE: NORMAL
BLOOD EXPIRATION DATE: NORMAL
BODY TEMPERATURE: 37 DEGREES CELSIUS
BUN SERPL-MCNC: 29 MG/DL (ref 6–23)
BZE UR QL SCN: ABNORMAL
CA-I BLDV-SCNC: 1.41 MMOL/L (ref 1.1–1.33)
CALCIUM SERPL-MCNC: 9.6 MG/DL (ref 8.6–10.6)
CANNABINOIDS UR QL SCN: ABNORMAL
CHLORIDE BLDV-SCNC: 105 MMOL/L (ref 98–107)
CHLORIDE SERPL-SCNC: 104 MMOL/L (ref 98–107)
CO2 SERPL-SCNC: 23 MMOL/L (ref 21–32)
COLOR UR: YELLOW
CREAT SERPL-MCNC: 2.23 MG/DL (ref 0.5–1.3)
DISPENSE STATUS: NORMAL
DISPENSE STATUS: NORMAL
EOSINOPHIL # BLD AUTO: 0.06 X10*3/UL (ref 0–0.4)
EOSINOPHIL NFR BLD AUTO: 0.6 %
ERYTHROCYTE [DISTWIDTH] IN BLOOD BY AUTOMATED COUNT: 22.9 % (ref 11.5–14.5)
ETHANOL SERPL-MCNC: <10 MG/DL
FENTANYL+NORFENTANYL UR QL SCN: ABNORMAL
GFR SERPL CREATININE-BSD FRML MDRD: 30 ML/MIN/1.73M*2
GLUCOSE BLDV-MCNC: 115 MG/DL (ref 74–99)
GLUCOSE SERPL-MCNC: 111 MG/DL (ref 74–99)
GLUCOSE UR STRIP.AUTO-MCNC: NEGATIVE MG/DL
HCO3 BLDV-SCNC: 24.3 MMOL/L (ref 22–26)
HCT VFR BLD AUTO: 21.1 % (ref 41–52)
HCT VFR BLD AUTO: 30.8 % (ref 41–52)
HCT VFR BLD EST: 19 % (ref 41–52)
HGB BLD-MCNC: 5.9 G/DL (ref 13.5–17.5)
HGB BLD-MCNC: 9 G/DL (ref 13.5–17.5)
HGB BLDV-MCNC: 6.3 G/DL (ref 13.5–17.5)
HOLD SPECIMEN: NORMAL
HYPOCHROMIA BLD QL SMEAR: NORMAL
IMM GRANULOCYTES # BLD AUTO: 0.05 X10*3/UL (ref 0–0.5)
IMM GRANULOCYTES NFR BLD AUTO: 0.5 % (ref 0–0.9)
INHALED O2 CONCENTRATION: 21 %
KETONES UR STRIP.AUTO-MCNC: NEGATIVE MG/DL
LACTATE BLDV-SCNC: 1.9 MMOL/L (ref 0.4–2)
LEUKOCYTE ESTERASE UR QL STRIP.AUTO: ABNORMAL
LIPASE SERPL-CCNC: 28 U/L (ref 9–82)
LYMPHOCYTES # BLD AUTO: 0.54 X10*3/UL (ref 0.8–3)
LYMPHOCYTES NFR BLD AUTO: 5.3 %
MCH RBC QN AUTO: 16.3 PG (ref 26–34)
MCHC RBC AUTO-ENTMCNC: 28 G/DL (ref 32–36)
MCV RBC AUTO: 58 FL (ref 80–100)
MONOCYTES # BLD AUTO: 0.48 X10*3/UL (ref 0.05–0.8)
MONOCYTES NFR BLD AUTO: 4.7 %
NEUTROPHILS # BLD AUTO: 9.03 X10*3/UL (ref 1.6–5.5)
NEUTROPHILS NFR BLD AUTO: 88.4 %
NITRITE UR QL STRIP.AUTO: NEGATIVE
NRBC BLD-RTO: 0 /100 WBCS (ref 0–0)
OPIATES UR QL SCN: ABNORMAL
OXYCODONE+OXYMORPHONE UR QL SCN: ABNORMAL
OXYHGB MFR BLDV: 62.7 % (ref 45–75)
PCO2 BLDV: 41 MM HG (ref 41–51)
PCP UR QL SCN: ABNORMAL
PH BLDV: 7.38 PH (ref 7.33–7.43)
PH UR STRIP.AUTO: 6 [PH]
PLATELET # BLD AUTO: 616 X10*3/UL (ref 150–450)
PO2 BLDV: 47 MM HG (ref 35–45)
POTASSIUM BLDV-SCNC: 5 MMOL/L (ref 3.5–5.3)
POTASSIUM SERPL-SCNC: 5.2 MMOL/L (ref 3.5–5.3)
PRODUCT BLOOD TYPE: 7300
PRODUCT BLOOD TYPE: 7300
PRODUCT CODE: NORMAL
PRODUCT CODE: NORMAL
PROT SERPL-MCNC: 7.1 G/DL (ref 6.4–8.2)
PROT UR STRIP.AUTO-MCNC: ABNORMAL MG/DL
RBC # BLD AUTO: 3.61 X10*6/UL (ref 4.5–5.9)
RBC # UR STRIP.AUTO: NEGATIVE /UL
RBC #/AREA URNS AUTO: ABNORMAL /HPF
RBC MORPH BLD: NORMAL
RH FACTOR (ANTIGEN D): NORMAL
SAO2 % BLDV: 65 % (ref 45–75)
SODIUM BLDV-SCNC: 135 MMOL/L (ref 136–145)
SODIUM SERPL-SCNC: 135 MMOL/L (ref 136–145)
SP GR UR STRIP.AUTO: 1.05
TARGETS BLD QL SMEAR: NORMAL
UNIT ABO: NORMAL
UNIT ABO: NORMAL
UNIT NUMBER: NORMAL
UNIT NUMBER: NORMAL
UNIT RH: NORMAL
UNIT RH: NORMAL
UNIT VOLUME: 350
UNIT VOLUME: 350
UROBILINOGEN UR STRIP.AUTO-MCNC: 2 MG/DL
WBC # BLD AUTO: 10.2 X10*3/UL (ref 4.4–11.3)
WBC #/AREA URNS AUTO: ABNORMAL /HPF
XM INTEP: NORMAL
XM INTEP: NORMAL

## 2023-11-17 PROCEDURE — C9113 INJ PANTOPRAZOLE SODIUM, VIA: HCPCS

## 2023-11-17 PROCEDURE — 99285 EMERGENCY DEPT VISIT HI MDM: CPT | Mod: 25 | Performed by: EMERGENCY MEDICINE

## 2023-11-17 PROCEDURE — 96365 THER/PROPH/DIAG IV INF INIT: CPT | Performed by: EMERGENCY MEDICINE

## 2023-11-17 PROCEDURE — 86920 COMPATIBILITY TEST SPIN: CPT

## 2023-11-17 PROCEDURE — 87086 URINE CULTURE/COLONY COUNT: CPT

## 2023-11-17 PROCEDURE — P9016 RBC LEUKOCYTES REDUCED: HCPCS

## 2023-11-17 PROCEDURE — 99222 1ST HOSP IP/OBS MODERATE 55: CPT | Performed by: STUDENT IN AN ORGANIZED HEALTH CARE EDUCATION/TRAINING PROGRAM

## 2023-11-17 PROCEDURE — 80307 DRUG TEST PRSMV CHEM ANLYZR: CPT

## 2023-11-17 PROCEDURE — 83690 ASSAY OF LIPASE: CPT

## 2023-11-17 PROCEDURE — 96375 TX/PRO/DX INJ NEW DRUG ADDON: CPT | Performed by: EMERGENCY MEDICINE

## 2023-11-17 PROCEDURE — 84075 ASSAY ALKALINE PHOSPHATASE: CPT

## 2023-11-17 PROCEDURE — 2550000001 HC RX 255 CONTRASTS

## 2023-11-17 PROCEDURE — 86901 BLOOD TYPING SEROLOGIC RH(D): CPT

## 2023-11-17 PROCEDURE — 82077 ASSAY SPEC XCP UR&BREATH IA: CPT

## 2023-11-17 PROCEDURE — 99285 EMERGENCY DEPT VISIT HI MDM: CPT | Performed by: EMERGENCY MEDICINE

## 2023-11-17 PROCEDURE — 1210000001 HC SEMI-PRIVATE ROOM DAILY

## 2023-11-17 PROCEDURE — 85014 HEMATOCRIT: CPT | Performed by: EMERGENCY MEDICINE

## 2023-11-17 PROCEDURE — 74174 CTA ABD&PLVS W/CONTRAST: CPT | Performed by: RADIOLOGY

## 2023-11-17 PROCEDURE — 99222 1ST HOSP IP/OBS MODERATE 55: CPT

## 2023-11-17 PROCEDURE — 71275 CT ANGIOGRAPHY CHEST: CPT | Performed by: RADIOLOGY

## 2023-11-17 PROCEDURE — 2550000001 HC RX 255 CONTRASTS: Performed by: EMERGENCY MEDICINE

## 2023-11-17 PROCEDURE — 36415 COLL VENOUS BLD VENIPUNCTURE: CPT

## 2023-11-17 PROCEDURE — 36430 TRANSFUSION BLD/BLD COMPNT: CPT

## 2023-11-17 PROCEDURE — 85025 COMPLETE CBC W/AUTO DIFF WBC: CPT

## 2023-11-17 PROCEDURE — 74174 CTA ABD&PLVS W/CONTRAST: CPT

## 2023-11-17 PROCEDURE — 2500000004 HC RX 250 GENERAL PHARMACY W/ HCPCS (ALT 636 FOR OP/ED)

## 2023-11-17 PROCEDURE — 84132 ASSAY OF SERUM POTASSIUM: CPT

## 2023-11-17 PROCEDURE — 99223 1ST HOSP IP/OBS HIGH 75: CPT

## 2023-11-17 PROCEDURE — 71275 CT ANGIOGRAPHY CHEST: CPT

## 2023-11-17 PROCEDURE — 96361 HYDRATE IV INFUSION ADD-ON: CPT | Performed by: EMERGENCY MEDICINE

## 2023-11-17 PROCEDURE — 81001 URINALYSIS AUTO W/SCOPE: CPT

## 2023-11-17 RX ORDER — ONDANSETRON HYDROCHLORIDE 2 MG/ML
4 INJECTION, SOLUTION INTRAVENOUS EVERY 6 HOURS PRN
Status: DISCONTINUED | OUTPATIENT
Start: 2023-11-17 | End: 2023-11-29 | Stop reason: HOSPADM

## 2023-11-17 RX ORDER — FORMOTEROL FUMARATE DIHYDRATE 20 UG/2ML
20 SOLUTION RESPIRATORY (INHALATION)
Status: DISCONTINUED | OUTPATIENT
Start: 2023-11-17 | End: 2023-11-29 | Stop reason: HOSPADM

## 2023-11-17 RX ORDER — HEPARIN SODIUM 5000 [USP'U]/ML
5000 INJECTION, SOLUTION INTRAVENOUS; SUBCUTANEOUS EVERY 8 HOURS
Status: DISCONTINUED | OUTPATIENT
Start: 2023-11-17 | End: 2023-11-29 | Stop reason: HOSPADM

## 2023-11-17 RX ORDER — HYDROMORPHONE HYDROCHLORIDE 1 MG/ML
0.5 INJECTION, SOLUTION INTRAMUSCULAR; INTRAVENOUS; SUBCUTANEOUS ONCE
Status: COMPLETED | OUTPATIENT
Start: 2023-11-17 | End: 2023-11-17

## 2023-11-17 RX ORDER — PANTOPRAZOLE SODIUM 40 MG/10ML
40 INJECTION, POWDER, LYOPHILIZED, FOR SOLUTION INTRAVENOUS ONCE
Status: COMPLETED | OUTPATIENT
Start: 2023-11-17 | End: 2023-11-17

## 2023-11-17 RX ORDER — ACETAMINOPHEN 325 MG/1
975 TABLET ORAL EVERY 8 HOURS PRN
Status: DISCONTINUED | OUTPATIENT
Start: 2023-11-17 | End: 2023-11-29 | Stop reason: HOSPADM

## 2023-11-17 RX ORDER — POLYETHYLENE GLYCOL 3350 17 G/17G
17 POWDER, FOR SOLUTION ORAL DAILY
Status: DISCONTINUED | OUTPATIENT
Start: 2023-11-17 | End: 2023-11-29 | Stop reason: HOSPADM

## 2023-11-17 RX ORDER — IPRATROPIUM BROMIDE AND ALBUTEROL SULFATE 2.5; .5 MG/3ML; MG/3ML
3 SOLUTION RESPIRATORY (INHALATION) EVERY 4 HOURS PRN
Status: DISCONTINUED | OUTPATIENT
Start: 2023-11-17 | End: 2023-11-29 | Stop reason: HOSPADM

## 2023-11-17 RX ORDER — PANTOPRAZOLE SODIUM 40 MG/10ML
40 INJECTION, POWDER, LYOPHILIZED, FOR SOLUTION INTRAVENOUS 2 TIMES DAILY
Status: DISCONTINUED | OUTPATIENT
Start: 2023-11-17 | End: 2023-11-20

## 2023-11-17 RX ORDER — CEFTRIAXONE 1 G/50ML
1 INJECTION, SOLUTION INTRAVENOUS ONCE
Status: COMPLETED | OUTPATIENT
Start: 2023-11-17 | End: 2023-11-17

## 2023-11-17 RX ORDER — HYDROMORPHONE HYDROCHLORIDE 1 MG/ML
0.2 INJECTION, SOLUTION INTRAMUSCULAR; INTRAVENOUS; SUBCUTANEOUS EVERY 6 HOURS PRN
Status: DISCONTINUED | OUTPATIENT
Start: 2023-11-17 | End: 2023-11-29 | Stop reason: HOSPADM

## 2023-11-17 RX ORDER — ONDANSETRON HYDROCHLORIDE 2 MG/ML
4 INJECTION, SOLUTION INTRAVENOUS ONCE
Status: COMPLETED | OUTPATIENT
Start: 2023-11-17 | End: 2023-11-17

## 2023-11-17 RX ORDER — POLYETHYLENE GLYCOL 3350 17 G/17G
17 POWDER, FOR SOLUTION ORAL DAILY PRN
Status: DISCONTINUED | OUTPATIENT
Start: 2023-11-17 | End: 2023-11-29 | Stop reason: HOSPADM

## 2023-11-17 RX ORDER — CEFTRIAXONE 1 G/50ML
1 INJECTION, SOLUTION INTRAVENOUS EVERY 24 HOURS
Status: DISCONTINUED | OUTPATIENT
Start: 2023-11-18 | End: 2023-11-19

## 2023-11-17 RX ADMIN — SODIUM CHLORIDE, POTASSIUM CHLORIDE, SODIUM LACTATE AND CALCIUM CHLORIDE 1000 ML: 600; 310; 30; 20 INJECTION, SOLUTION INTRAVENOUS at 04:15

## 2023-11-17 RX ADMIN — ONDANSETRON 4 MG: 2 INJECTION INTRAMUSCULAR; INTRAVENOUS at 04:15

## 2023-11-17 RX ADMIN — CEFTRIAXONE SODIUM 1 G: 1 INJECTION, SOLUTION INTRAVENOUS at 04:50

## 2023-11-17 RX ADMIN — HYDROMORPHONE HYDROCHLORIDE 0.5 MG: 1 INJECTION, SOLUTION INTRAMUSCULAR; INTRAVENOUS; SUBCUTANEOUS at 04:16

## 2023-11-17 RX ADMIN — IOHEXOL 100 ML: 350 INJECTION, SOLUTION INTRAVENOUS at 06:39

## 2023-11-17 RX ADMIN — PANTOPRAZOLE SODIUM 40 MG: 40 INJECTION, POWDER, FOR SOLUTION INTRAVENOUS at 21:26

## 2023-11-17 RX ADMIN — PANTOPRAZOLE SODIUM 40 MG: 40 INJECTION, POWDER, FOR SOLUTION INTRAVENOUS at 04:40

## 2023-11-17 RX ADMIN — IOHEXOL 80 ML: 350 INJECTION, SOLUTION INTRAVENOUS at 12:10

## 2023-11-17 ASSESSMENT — ENCOUNTER SYMPTOMS
EYE PAIN: 0
DYSURIA: 0
HEADACHES: 0
JOINT SWELLING: 0
ABDOMINAL PAIN: 1
CONFUSION: 0
VOMITING: 1
SHORTNESS OF BREATH: 1
FEVER: 0

## 2023-11-17 ASSESSMENT — LIFESTYLE VARIABLES
HAVE YOU EVER FELT YOU SHOULD CUT DOWN ON YOUR DRINKING: NO
REASON UNABLE TO ASSESS: NO
EVER FELT BAD OR GUILTY ABOUT YOUR DRINKING: NO
HAVE PEOPLE ANNOYED YOU BY CRITICIZING YOUR DRINKING: NO
EVER HAD A DRINK FIRST THING IN THE MORNING TO STEADY YOUR NERVES TO GET RID OF A HANGOVER: NO

## 2023-11-17 ASSESSMENT — PAIN DESCRIPTION - ONSET: ONSET: AWAKENED FROM SLEEP

## 2023-11-17 ASSESSMENT — PAIN DESCRIPTION - DESCRIPTORS
DESCRIPTORS: ACHING
DESCRIPTORS: ACHING

## 2023-11-17 ASSESSMENT — PAIN SCALES - PAIN ASSESSMENT IN ADVANCED DEMENTIA (PAINAD)
NEGVOCALIZATION: OCCASIONAL MOAN/GROAN, LOW SPEECH, NEGATIVE/DISAPPROVING QUALITY
BODYLANGUAGE: TENSE, DISTRESSED PACING, FIDGETING
TOTALSCORE: 4
BREATHING: OCCASIONAL LABORED BREATHING, SHORT PERIOD OF HYPERVENTILATION
CONSOLABILITY: NO NEED TO CONSOLE
FACIALEXPRESSION: SAD, FRIGHTENED, FROWN

## 2023-11-17 ASSESSMENT — COLUMBIA-SUICIDE SEVERITY RATING SCALE - C-SSRS
6. HAVE YOU EVER DONE ANYTHING, STARTED TO DO ANYTHING, OR PREPARED TO DO ANYTHING TO END YOUR LIFE?: NO
1. IN THE PAST MONTH, HAVE YOU WISHED YOU WERE DEAD OR WISHED YOU COULD GO TO SLEEP AND NOT WAKE UP?: NO
2. HAVE YOU ACTUALLY HAD ANY THOUGHTS OF KILLING YOURSELF?: NO

## 2023-11-17 ASSESSMENT — PAIN DESCRIPTION - PAIN TYPE: TYPE: ACUTE PAIN

## 2023-11-17 ASSESSMENT — PAIN SCALES - GENERAL
PAINLEVEL_OUTOF10: 0 - NO PAIN
PAINLEVEL_OUTOF10: 6
PAINLEVEL_OUTOF10: 5 - MODERATE PAIN

## 2023-11-17 ASSESSMENT — PAIN - FUNCTIONAL ASSESSMENT
PAIN_FUNCTIONAL_ASSESSMENT: 0-10
PAIN_FUNCTIONAL_ASSESSMENT: 0-10

## 2023-11-17 ASSESSMENT — PAIN DESCRIPTION - LOCATION
LOCATION: ABDOMEN
LOCATION: ABDOMEN

## 2023-11-17 ASSESSMENT — PAIN DESCRIPTION - ORIENTATION
ORIENTATION: MID
ORIENTATION: MID

## 2023-11-17 ASSESSMENT — PAIN DESCRIPTION - FREQUENCY: FREQUENCY: CONSTANT/CONTINUOUS

## 2023-11-17 ASSESSMENT — PAIN DESCRIPTION - PROGRESSION: CLINICAL_PROGRESSION: GRADUALLY WORSENING

## 2023-11-17 NOTE — PROGRESS NOTES
Pharmacy Medication History Review    Noel Aquino is a 78 y.o. male admitted for Anemia. Pharmacy reviewed the patient's invyq-im-myxnoaltc medications and allergies for accuracy.    The list below reflects the updated PTA list. Comments regarding how patient may be taking medications differently can be found in the Admit Orders Activity  Prior to Admission Medications   Prescriptions Last Dose Informant Patient Reported?   albuterol 90 mcg/actuation inhaler Unknown  No   Sig: INHALE 2 PUFFS EVERY 6 HOURS AS NEEDED FOR SHORTNESS OF BREATH   amLODIPine (Norvasc) 5 mg tablet Unknown  No   Sig: TAKE 1 TABLET BY MOUTH ONCE DAILY   azithromycin (Zithromax) 500 mg tablet Unknown  No   Sig: TAKE 1 TABLET BY MOUTH EVERY 24 HOURS   ferrous sulfate 325 (65 Fe) MG tablet Unknown  No   Sig: TAKE 1 TABLET BY MOUTH ONCE DAILY   fluticasone (Flovent) 110 mcg/actuation inhaler Unknown  Yes   Sig: Inhale 2 puffs 2 times a day.   pantoprazole (ProtoNix) 40 mg EC tablet Unknown  No   Sig: TAKE 1 TABLET BY MOUTH ONCE DAILY   tamsulosin (Flomax) 0.4 mg 24 hr capsule Unknown  No   Sig: TAKE 1 CAPSULE BY MOUTH EVERY NIGHT AT BEDTIME   tiotropium-olodateroL (Stiolto Respimat) 2.5-2.5 mcg/actuation mist inhaler Unknown  No   Sig: INHALE 2 PUFFS ONCE DAILY      Facility-Administered Medications: None        The list below reflects the updated allergy list. Please review each documented allergy for additional clarification and justification.  Allergies  Reviewed by Marilyn Bernal on 11/17/2023   No Known Allergies         Patient accepts M2B at discharge. Pharmacy has been updated to [facility].    Sources used to complete the med history include out patient fill history, OARRS, and chart review. Was unable to interview the patient due to him being very agitated, but based on the previous resources his medication list has been updated.    Below are additional concerns with the patient's PTA list.  SHARONDA Marie, Stephani  Transitions  [facility] Pharmacist  [facility]  Please reach out via Secure Chat for questions, or if no response call [phone] or vocera [facility]

## 2023-11-17 NOTE — CONSULTS
Gastroenterology Consult Service INITIAL CONSULT Note  Department of Gastroenterology & Hepatology  [facility]    [facility]  November 17, 2023   Patient: Noel Aquino    Medical Record: [id number]    Reason for Consult: Hematemesis  Requesting Service: Emergency Medicine    Subjective     Noel Aquino is a 78 y.o. male with  history of left upper lobe mass (SCC on bx in 8/2023), COPD, CKD, duodenal AVM, GI bleed, polysubstance use disorder (ETOH, tobacco, cocaine), JOSÉ MIGUEL, HTN, BPH, presented by EMS to the emergency department today for hematemesis. Gastroenterology is consulted for  hematemesis..    ED course: CBC notable for a hemoglobin of 5.9. received 2 units PRBCs incrementing to 9.  Baseline hemoglobin is 7.  Chemistries remarkable for an elevated BUN and creatinine at 29 and 2.23 respectively. Positive Utox for cocaine, S/p Zofran, Protonix, and Dilaudid given for symptomatic control, and ceftriaxone stat dose prophylactic.  CTA abdomen pelvis showed no active extravasation within the GI tract,, no acute mesenteric ischemia, or acute diverticulitis. The patient had no hematemesis episodes in the ED and remained HDS, CT chest significant for is significant stenosis of the proximal left lower lobe bronchus, secondary to compression by large left upper lobe lung known mass.     The patient was seen at bedside in the ED: He is alert but agitated, repeatedly saying he wants to eat and refusing to participate in history taking.   He mentioned calling the EMS as he had red colored vomiting and was hungry. Cannot recall how many episodes or any associated abdominal pain, melena or hematochezia. Mentioned he had painful BM for a couple of days. Denies epistaxis or SOB, or chest pain. Unsure if he had food or drinks with red dye.    No other ROS was elicited. reports there is no NOK to call.     On chart  medication review the patient is on ferrous sulfate 325 mg ,  PPI 40 mg every day, no antiplatelets or anticoagulants noted in his home medication.    Notable GI history:   - Recent admission 07/2-6 2023 [facility], anemia, and workup for possible lung mass.  S/p 1 unit Prbc and 3 doses of iron sucrose 100mg  - admission 11/08-11/14 2022 [facility]: hematemesis. Hg on admission 4.9 s/p 3U pRBC in the ED. Hg remained stable ~7.0-7.5 since transfusion. 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 Normal stomach and esophagus.   According to [facility] notes: EGD in the past shows AVM and he had history of GI bleed going back to 2016. No other scope found on chart review.        Surgical History:    Past Surgical History:   Procedure Laterality Date    CT ABDOMEN PELVIS ANGIOGRAM W AND/OR WO IV CONTRAST  11/17/2023    CT ABDOMEN PELVIS ANGIOGRAM W AND/OR WO IV CONTRAST 11/17/2023 [facility] CT     Social History:    Social History     Socioeconomic History    Marital status: Unknown     Spouse name: Not on file    Number of children: Not on file    Years of education: Not on file    Highest education level: Not on file   Occupational History    Not on file   Tobacco Use    Smoking status: Not on file    Smokeless tobacco: Not on file   Substance and Sexual Activity    Alcohol use: Not on file    Drug use: Not on file    Sexual activity: Not on file   Other Topics Concern    Not on file   Social History Narrative    Not on file     Social Determinants of Health     Financial Resource Strain: Not on file   Food Insecurity: Not on file   Transportation Needs: Not on file   Physical Activity: Not on file   Stress: Not on file   Social Connections: Not on file   Intimate Partner Violence: Not on file   Housing Stability: Not on file       Family History:  No family history on file.      Objective     Vitals:    Vitals:    11/17/23 1300 11/17/23 1414 11/17/23 1528 11/17/23 1700   BP: 144/67 (!) 172/92  164/87   BP Location:       Patient Position:       Pulse: 85 78 78 79   Resp: 19  20 19 12   Temp:  36.1 °C (97 °F)     TempSrc:  Temporal     SpO2: 94% 96% 95% 98%   Weight:       Height:           Intake/Output Summary (Last 24 hours) at 11/17/2023 1759  Last data filed at 11/17/2023 1414  Gross per 24 hour   Intake 680 ml   Output --   Net 680 ml       Physical Exam  Gen: Cachectic, in no acute distress, on 2 L NC.  HEENT: PEERL, EOMI, sclera anicteric, no conjunctival injection, MMM, no oropharyngeal lesions, red discoloration of lips and teeth.  Resp: CTAB  CV: unable to asses.  GI: diffusely tender, non-distended, normal BSs in all 4 quadrants, voluntary guarding, but no rigidity.  Skin: warm and dry, no lesions, no rashes     Labs:   Lab Results   Component Value Date    WBC 10.2 11/17/2023    HGB 9.0 (L) 11/17/2023    HCT 30.8 (L) 11/17/2023    MCV 58 (L) 11/17/2023     (H) 11/17/2023     Lab Results   Component Value Date    GLUCOSE 111 (H) 11/17/2023    CALCIUM 9.6 11/17/2023     (L) 11/17/2023    K 5.2 11/17/2023    CO2 23 11/17/2023     11/17/2023    BUN 29 (H) 11/17/2023    CREATININE 2.23 (H) 11/17/2023     Lab Results   Component Value Date    ALT 4 (L) 11/17/2023    AST 20 11/17/2023    ALKPHOS 35 11/17/2023    BILITOT 0.4 11/17/2023     Lab Results   Component Value Date    INR 1.1 08/17/2023    INR 1.1 07/03/2023    PROTIME 12.8 08/17/2023    PROTIME 12.7 07/03/2023     Albumin of 2.7    Imaging:     === 11/17/23 ===    CT ANGIO CHEST FOR PULMONARY EMBOLISM    - Impression -  1. Significant interval increase in size of large heterogenous mass  centered within left upper lobe with associated mass effect on the  adjacent vascular structures, including significant narrowing of the  left main pulmonary artery and left interlobar artery with non  opacification of the segmental arteries throughout the left lower  lobe, as well as significant narrowing and obliteration of the left  superior pulmonary vein. There is a rounded hypodense filling defect  within the left  superior pulmonary vein which is contiguous with the  left upper lobe soft tissue mass, consistent with tumor thrombus.  There are otherwise no discrete filling defects to suggest pulmonary  embolism.  2. Significant narrowing of the proximal left lower lobe bronchus  secondary to mass effect by the left upper lobe mass.  3. Small left-sided pleural effusion with adjacent atelectasis.  4. Mild upper lung predominant emphysematous changes.  5. Multiple peripheral wedge-shaped areas of hypoattenuation  throughout the bilateral kidneys, favored to represent multifocal  renal infarcts, however acute pyelonephritis not entirely excluded.  Metastatic renal involvement can not be excluded. Clinical  correlation and urinalysis recommended for further evaluation. Please  see same day separately dictated CTA abdomen/pelvis for further  characterization.  6. Additional chronic findings as above.    CT ANGIO ABDOMEN AND PELVIS:    IMPRESSION:  Exam is significantly limited due to diffuse edema and abdominopelvic  fluid. All findings are reported within these limitations.  1. Partial visualization of what appears to be a filling defect of a  left pulmonary vein. This is incompletely evaluated on this exam, but  may represent volume averaging versus a true luminal thrombus.  Recommend CTA PE to exclude the possibility of a pulmonary vein  embolus.  2. Within the significant limitations of this exam, there is no  direct evidence to suggest active extravasation within the GI tract,  acute mesenteric ischemia, or acute diverticulitis.  3. Partial visualization of an ill-defined heterogenous mass within  the left upper lobe, with hypodense areas suggestive of necrosis.  There is suggestion that this mass is increased in size compared to  prior exam.    GI procedures:  EGD in 11/2022   Findings:       The examined esophagus was normal.        There is no endoscopic evidence of bleeding in the stomach without        old or fresh blood.         The entire examined stomach was normal.        A single angiodysplastic lesion without bleeding was found in the        second portion of the duodenum. APC was done to the duodenal        angiodysplastic lesion.        There is no endoscopic evidence of bleeding in the entire examined        duodenum.   Impression:            - Normal esophagus.                          - Normal stomach.                          - A single non-bleeding angiodysplastic lesion in                          the duodenum s/p APC.                          - No specimens collected.     Assessment & Plan       Noel Aquino is a 78 y.o. male with  history of left upper lobe mass (SCC on bx in 8/2023), COPD, CKD, duodenal AVM, GI bleed, polysubstance use disorder (ETOH, tobacco, cocaine), JOSÉ MIGUEL, HTN, BPH, presented by EMS to the emergency department for hematemesis. Gastroenterology is consulted for  hematemesis..    The patient is a poor historian with self reported Red colored vomiting, Hb on presentation is 5.9 ( 1 g drop from his hb on 08/17 7.1 and previously hb baseline of 7) incrementing well after 2 units prbc to 9, the patient is hemodynamically stable. With no other episodes in hospital, Unclear source of bleeding can be Hematemesis, or hemoptysis. Or can be related to ingestion of red colored food or drinks. The patient has a previous history of hematemesis in 2022 was presumed due to single non-bleeding angiodysplastic lesion in  the duodenum s/p APC. Ddx for UGI and hematemesis can include AVM, PUD, gastritis or malignant lesions. The patient is not known to have any liver cirrhosis or varices visualized on previous EGD. No signs or symptoms of liver disease.     # Hematemesis  # UGI  # JOSÉ MIGUEL    - Trend CBC  - Monitor vitals  - C/W PPI IV   - if the patient has any hematemesis with hemodynamic instability contact GI fellow on call.   - The patient is ok to start with clear liquid diet and escalate as tolerated.     Patient  seen and discussed with attending, Dr. Daniel.     Thank you for the consultation. Gastroenterology will continue to the follow the patient.   Please do not hesitate to contact me on Haiku or page [phone] if there are any further questions between the weekday hours of 7 AM - 5 PM.   If there is an urgent concern during the weekend, after-hours, or holidays; then please page the on-call GI fellow at [phone]. Thank you.    SIGNATURE: Chelle Ayoub MD PATIENT NAME: Noel Aquino   DATE: November 17, 2023 MRN: [id number]

## 2023-11-17 NOTE — ED PROVIDER NOTES
CC: Abdominal Pain , hematemesis    HPI: Patient is a 78-year-old male with history of left upper lobe mass, PE, COPD, CKD, duodenal AVM, GI bleed, polysubstance use disorder (ETOH, tobacco, cocaine), JOSÉ MIGUEL, HTN, BPH, presented emergency department today for hematemesis.  Patient reports for the past couple days now, has had intermittent episodes of hematemesis, 2 this morning.  He reports he has been having chronic abdominal pain that has progressively gotten worse for the past couple days.  Reports his pain is 9 out of 10 and has active guarding here in the emergency department.  Feels nauseous on arrival.  No chest pain or shortness of breath associated.  Reports no fevers or chills, changes in urination/stool.    Records Reviewed:  Recent available ED and inpatient notes reviewed in EMR.    PMHx/PSHx:  Per HPI.   - has no past medical history on file.  - has no past surgical history on file.  - has Anemia; Body mass index (BMI) 23.0-23.9, adult; Body mass index (BMI) 33.0-33.9, adult; Chronic kidney disease, stage 4 (severe) (CMS/HCC); Stage 3 chronic kidney disease (CMS/HCC); Cocaine abuse (CMS/HCC); Elevated blood pressure reading without diagnosis of hypertension; Lung mass; Iron deficiency anemia; Hematemesis; Primary hypertension; Essential (primary) hypertension; Duodenal arteriovenous malformation; Current smoker; Cocaine use; Chronic obstructive pulmonary disease (CMS/HCC); BPH (benign prostatic hyperplasia); Benign prostatic hyperplasia; Uncomplicated asthma; Asthma; Personal history of pulmonary embolism; Mass of left lung; Narcolepsy without cataplexy; Hypertensive heart disease without heart failure; Gastro-esophageal reflux disease without esophagitis; Long term (current) use of inhaled steroids; Elevated troponin level; Gangrene and necrosis of lung (CMS/HCC); HTN (hypertension); Polysubstance abuse (CMS/HCC); and Squamous cell lung cancer (CMS/HCC) on their problem list.    Medications:  Reviewed in  EMR. See EMR for complete list of medications and doses.    Allergies:  Patient has no known allergies.    Social History:  - Tobacco:  has no history on file for tobacco use.   - Alcohol:  has no history on file for alcohol use.   - Illicit Drugs:  has no history on file for drug use.     ROS:  Per HPI.       ???????????????????????????????????????????????????????????????  Triage Vitals:  T 36.9 °C (98.5 °F)  HR (!) 132  /76  RR 15  O2 96 % None (Room air)    Physical Exam  Vitals and nursing note reviewed.   Constitutional:       General: He is not in acute distress.     Appearance: He is cachectic. He is ill-appearing.   HENT:      Head: Normocephalic and atraumatic.   Eyes:      Conjunctiva/sclera: Conjunctivae normal.   Cardiovascular:      Rate and Rhythm: Normal rate and regular rhythm.      Heart sounds: No murmur heard.  Pulmonary:      Effort: Pulmonary effort is normal. No respiratory distress.      Breath sounds: Normal breath sounds.   Abdominal:      Tenderness: There is generalized abdominal tenderness. There is guarding and rebound.   Musculoskeletal:         General: No swelling.      Cervical back: Neck supple.   Skin:     General: Skin is warm and dry.      Capillary Refill: Capillary refill takes less than 2 seconds.   Neurological:      Mental Status: He is alert.   Psychiatric:         Mood and Affect: Mood normal.     ???????????????????????????????????????????????????????????????    Medical Decision Making   Patient is a 78-year-old male presenting to the emergency department today for hematemesis and abdominal pain.  On arrival, tachycardic to 132.  On examination, patient has diffuse tenderness to palpation of the abdomen, worse in the epigastric region.  He has active guarding and rebound tenderness.  Given his known history of GI bleeds, we will get a CBC, CMP, venous full panel with a lactate, and CTA abdomen pelvis for further evaluation.  Differential diagnosis includes  ulcer, diverticulitis, appendicitis, gallstones, pancreatitis, or worsening metastatic burden in the abdominal region.    Update: CBC notable for a hemoglobin of 5.9.  Patient typed and crossed for 2 units PRBCs.  Baseline hemoglobin is usually in the 70 range.  Chemistries remarkable for an elevated BUN and creatinine at 29 and 2.23 respectively.  Venous blood gas showed a lactate of 4.9.  Patient maintained saturations around 88% on room air throughout the evening and was placed on 2 L nasal cannula with improvement of oxygen to 94%.  Heart rate improved to 90 fluids here in the emergency department.  I think original tachycardia was secondary to pain.  Zofran, Protonix, and Dilaudid given for symptomatic control.  Given patient's history of upper GI bleeds and multiple chronic health issues, given ceftriaxone here for Prophylactic antibiotic coverage.  Patient signed out to oncoming provider with CTA abdomen pelvis and reassessment pending.  Patient will likely need admission for upper GI bleeding, observation, and possible scoping/further evaluation.  Diagnoses as of 12/04/23 1334   Anemia   Hematemesis with nausea   Thrombosis       Social Determinants Limiting Care:      Disposition:   Admit    Byron Buchanan MD  Emergency Medicine PGY2      ATTENDING ATTESTATION:  Patient was treated in conjunction with resident physician.  I saw and examined the patient and agree with the findings and plan of care as documented.     Sara Gould, DO  ED Attending        Procedures ? SmartLinks last updated 11/17/2023 4:01 AM          Sara Gould DO  12/04/23 1334

## 2023-11-17 NOTE — CONSULTS
Reason For Consult  Abnormal imaging, concern for hemoptysis    History Of Present Illness  Noel Aquino is a 78 y.o. male presenting with abdominal pain and hematemesis. His past medical history is significant for squamous cell ca of the lung, prior PE, COPD (no PFT on file), polysubstance use disorder, hypertension. Pulmonary consulted for concern for hemoptysis and abnormal imaging.     Diagnosed on bronchoscopy with EBUS with MESHA squamous cell ca in 08/2023 and was due to follow up with pulmonary, onc and PCP but did not follow up and has not received any treatment for the same.     Very poor historian- reports episodes of hematemesis last 2 days ago, does not report quantity or appearance of the blood. Also reports constipation with abdominal pain worse while trying to have a bowel movement. Throughout our encounter, reports feeling hungry and does not want to participate further in the conversation.     On arrival noted to have a Hb drop to the 5 range, tachycardia. A CT done (as below) was most concerning for a increase in large mass in the MESHA with narrowing of the left main PA, narrowing of the LLL bronchus, hypodense filling defect in the superior pulmonary vein consistent with a thrombus. Post fluids and transfusions, patient's hemodynamics have improved.       Past Medical History  He has no past medical history on file.    Surgical History  He has a past surgical history that includes CT angio abdomen pelvis w and or wo IV IV contrast (11/17/2023).     Social History  He has no history on file for tobacco use, alcohol use, and drug use.    Family History  No family history on file.     Allergies  Patient has no known allergies.    Review of Systems  Review of Systems   Constitutional:  Negative for fever.   HENT:  Negative for congestion.    Eyes:  Negative for pain.   Respiratory:  Positive for shortness of breath.    Cardiovascular:  Negative for chest pain.   Gastrointestinal:  Positive for  "abdominal pain and vomiting.   Endocrine: Negative for polyuria.   Genitourinary:  Negative for dysuria.   Musculoskeletal:  Negative for joint swelling.   Neurological:  Negative for headaches.   Psychiatric/Behavioral:  Negative for confusion.          Physical Exam  General: Lying down, no respiratory distress  HEENT: Unremarkable  CVS: S1, S2  RS: Decreased breath sounds at the left base  Abdomen: Voluntary guarding intermittently  MSK: No pedal edema  Neuro: AO  Psych: Uncooperative with history     Last Recorded Vitals  Blood pressure 164/87, pulse 79, temperature 36.1 °C (97 °F), temperature source Temporal, resp. rate 12, height 1.753 m (5' 9\"), weight 70.3 kg (155 lb), SpO2 98 %.    Relevant Results  Results for orders placed or performed during the hospital encounter of 11/17/23 (from the past 24 hour(s))   CBC and Auto Differential   Result Value Ref Range    WBC 10.2 4.4 - 11.3 x10*3/uL    nRBC 0.0 0.0 - 0.0 /100 WBCs    RBC 3.61 (L) 4.50 - 5.90 x10*6/uL    Hemoglobin 5.9 (LL) 13.5 - 17.5 g/dL    Hematocrit 21.1 (L) 41.0 - 52.0 %    MCV 58 (L) 80 - 100 fL    MCH 16.3 (L) 26.0 - 34.0 pg    MCHC 28.0 (L) 32.0 - 36.0 g/dL    RDW 22.9 (H) 11.5 - 14.5 %    Platelets 616 (H) 150 - 450 x10*3/uL    Neutrophils % 88.4 40.0 - 80.0 %    Immature Granulocytes %, Automated 0.5 0.0 - 0.9 %    Lymphocytes % 5.3 13.0 - 44.0 %    Monocytes % 4.7 2.0 - 10.0 %    Eosinophils % 0.6 0.0 - 6.0 %    Basophils % 0.5 0.0 - 2.0 %    Neutrophils Absolute 9.03 (H) 1.60 - 5.50 x10*3/uL    Immature Granulocytes Absolute, Automated 0.05 0.00 - 0.50 x10*3/uL    Lymphocytes Absolute 0.54 (L) 0.80 - 3.00 x10*3/uL    Monocytes Absolute 0.48 0.05 - 0.80 x10*3/uL    Eosinophils Absolute 0.06 0.00 - 0.40 x10*3/uL    Basophils Absolute 0.05 0.00 - 0.10 x10*3/uL   Comprehensive metabolic panel   Result Value Ref Range    Glucose 111 (H) 74 - 99 mg/dL    Sodium 135 (L) 136 - 145 mmol/L    Potassium 5.2 3.5 - 5.3 mmol/L    Chloride 104 98 - 107 " mmol/L    Bicarbonate 23 21 - 32 mmol/L    Anion Gap 13 10 - 20 mmol/L    Urea Nitrogen 29 (H) 6 - 23 mg/dL    Creatinine 2.23 (H) 0.50 - 1.30 mg/dL    eGFR 30 (L) >60 mL/min/1.73m*2    Calcium 9.6 8.6 - 10.6 mg/dL    Albumin 2.7 (L) 3.4 - 5.0 g/dL    Alkaline Phosphatase 35 33 - 136 U/L    Total Protein 7.1 6.4 - 8.2 g/dL    AST 20 9 - 39 U/L    Bilirubin, Total 0.4 0.0 - 1.2 mg/dL    ALT 4 (L) 10 - 52 U/L   Lipase   Result Value Ref Range    Lipase 28 9 - 82 U/L   BLOOD GAS VENOUS FULL PANEL   Result Value Ref Range    POCT pH, Venous 7.38 7.33 - 7.43 pH    POCT pCO2, Venous 41 41 - 51 mm Hg    POCT pO2, Venous 47 (H) 35 - 45 mm Hg    POCT SO2, Venous 65 45 - 75 %    POCT Oxy Hemoglobin, Venous 62.7 45.0 - 75.0 %    POCT Hematocrit Calculated, Venous 19.0 (L) 41.0 - 52.0 %    POCT Sodium, Venous 135 (L) 136 - 145 mmol/L    POCT Potassium, Venous 5.0 3.5 - 5.3 mmol/L    POCT Chloride, Venous 105 98 - 107 mmol/L    POCT Ionized Calicum, Venous 1.41 (H) 1.10 - 1.33 mmol/L    POCT Glucose, Venous 115 (H) 74 - 99 mg/dL    POCT Lactate, Venous 1.9 0.4 - 2.0 mmol/L    POCT Base Excess, Venous -0.8 -2.0 - 3.0 mmol/L    POCT HCO3 Calculated, Venous 24.3 22.0 - 26.0 mmol/L    POCT Hemoglobin, Venous 6.3 (LL) 13.5 - 17.5 g/dL    POCT Anion Gap, Venous 11.0 10.0 - 25.0 mmol/L    Patient Temperature 37.0 degrees Celsius    FiO2 21 %   Morphology   Result Value Ref Range    RBC Morphology See Below     Hypochromia Marked     Target Cells Many     Acanthocytes Few    Ethanol   Result Value Ref Range    Alcohol <10 <=10 mg/dL   Type and Screen   Result Value Ref Range    ABO TYPE B     Rh TYPE POS     ANTIBODY SCREEN NEG    Prepare RBC: 2 Units   Result Value Ref Range    PRODUCT CODE P6380C60     Unit Number [id number]     Unit ABO B     Unit RH POS     XM INTEP COMP     Dispense Status TR     Blood Expiration Date December 01, 2023 23:59 EST     PRODUCT BLOOD TYPE 7300     UNIT VOLUME 350     PRODUCT CODE R7310Q94     Unit  Number [id number]     Unit ABO B     Unit RH POS     XM INTEP COMP     Dispense Status TR     Blood Expiration Date December 01, 2023 23:59 EST     PRODUCT BLOOD TYPE 7300     UNIT VOLUME 350    Urinalysis with Reflex Microscopic and Culture   Result Value Ref Range    Color, Urine Yellow Straw, Yellow    Appearance, Urine Clear Clear    Specific Gravity, Urine 1.048 (N) 1.005 - 1.035    pH, Urine 6.0 5.0, 5.5, 6.0, 6.5, 7.0, 7.5, 8.0    Protein, Urine 30 (1+) (N) NEGATIVE mg/dL    Glucose, Urine NEGATIVE NEGATIVE mg/dL    Blood, Urine NEGATIVE NEGATIVE    Ketones, Urine NEGATIVE NEGATIVE mg/dL    Bilirubin, Urine NEGATIVE NEGATIVE    Urobilinogen, Urine 2.0 (N) <2.0 mg/dL    Nitrite, Urine NEGATIVE NEGATIVE    Leukocyte Esterase, Urine LARGE (3+) (A) NEGATIVE   Extra Urine Gray Tube   Result Value Ref Range    Extra Tube Hold for add-ons.    Microscopic Only, Urine   Result Value Ref Range    WBC, Urine 11-20 (A) 1-5, NONE /HPF    RBC, Urine 3-5 NONE, 1-2, 3-5 /HPF   Hemoglobin and Hematocrit   Result Value Ref Range    Hemoglobin 9.0 (L) 13.5 - 17.5 g/dL    Hematocrit 30.8 (L) 41.0 - 52.0 %   Drug Screen, Urine   Result Value Ref Range    Amphetamine Screen, Urine Presumptive Negative Presumptive Negative    Barbiturate Screen, Urine Presumptive Negative Presumptive Negative    Benzodiazepines Screen, Urine Presumptive Negative Presumptive Negative    Cannabinoid Screen, Urine Presumptive Negative Presumptive Negative    Cocaine Metabolite Screen, Urine Presumptive Positive (A) Presumptive Negative    Fentanyl Screen, Urine Presumptive Negative Presumptive Negative    Opiate Screen, Urine Presumptive Negative Presumptive Negative    Oxycodone Screen, Urine Presumptive Negative Presumptive Negative    PCP Screen, Urine Presumptive Negative Presumptive Negative          Assessment/Plan   Noel Aquino is a 78 y.o. male presenting with abdominal pain and hematemesis. His past medical history is significant for  squamous cell ca of the lung, prior PE, COPD (no PFT on file), polysubstance use disorder, hypertension. Pulmonary consulted for concern for hemoptysis and abnormal imaging.     Imaging concerning for progression of known squamous cell cancer now with mass effect on both airways as well as vascular structures, although clinically not causing any compromise or respiratory distress. Given distribution of the mass, large area of the MESHA is compromised with the left lower lobe no longer salvageable reasonably with a stent. He would need definitive therapy with chemo/RT per oncology.     It is unlikely from his story, large drop in hemoglobin, and lack of aspirational findings on his scan that he had any meaningful hemoptysis prior to presentation.     Recommendations:   - Would not recommend stenting at this time given non salvageable left lower lobe  - Would recommend consulting oncology for management of known untreated squamous cell ca  - Given filling defect in the pulmonary vein, would consult vascular medicine for further recommendations if any  - If he were to develop hemoptysis, inhaled TXA can be used as a temporizing measure  - Please ensure he has an active type and screen and two wide bore PIVs (18G or larger) at all times    Sulma Lara MD

## 2023-11-17 NOTE — SIGNIFICANT EVENT
SENIOR STAFFING NOTE  Please see H&P by PGY-1 for further details.     Subjective     Chief concern:   Chief Complaint   Patient presents with    Abdominal Pain       History of present illness:  Noel Aquino is a 78 y.o. male presenting with PMHx of past medical history of left upper lobe mass, PE, COPD, CKD, duodenal AVM, GI bleed, polysubstance abuse (alcohol, tobacco, cocaine), JOSÉ MIGUEL, HTN, and BPH admitted for acute on chronic anemia and abdominal pain.  Patient states that his abdominal pain has been going on for several years but has become worse over an unknown period of time prompting his presentation to the ED now.  He is unable to describe the quality or the specific location of the pain.  He states that it is 7-8/10 in severity.  Nothing helps the pain.  He reports vomiting blood intermittently over the past several years as well.  He reports last vomiting blood about 3 days ago.  He is unable to quantify the amount of blood.  Denies any lightheadedness or dizziness.  He does endorse constipation.  He reports being very hungry and not eating in the past several days, with his last meal being only watermelon.  He denies any recent drug use.  Denies any headaches, fevers, chills, chest pain, dysuria.    ED course:     - Vital Signs: °C 36.5/ /77/ HR 89/ RR 18/ 96 % on RA    - Labs:  CBC: WBC 10.2 / Hb 5.9 -> 9.0 / Hct 21.1 / Plt 616   BMP: Na 135 / K 5.2 / Cl 104 / CO4 23 / BUN 29 / Cr 2.23               LFP: AST 20 / ALT 4 / ALP 35  / Alb 2.7 / TP 7.1               VBG: pH 7.38 / pCO2 41   Lactate: 1.9   UDS: cocaine positive; alcohol <10   UA: large LE, WBC 11-20, 1+ protein    - Imaging:  CT:  11/17/2023  1. Significant interval increase in size of large heterogenous mass   centered within left upper lobe with associated mass effect on the   adjacent vascular structures, including significant narrowing of the   left main pulmonary artery and left interlobar artery with non   opacification of the  segmental arteries throughout the left lower   lobe, as well as significant narrowing and obliteration of the left   superior pulmonary vein. There is a rounded hypodense filling defect   within the left superior pulmonary vein which is contiguous with the   left upper lobe soft tissue mass, consistent with tumor thrombus.   There are otherwise no discrete filling defects to suggest pulmonary   embolism.   2. Significant narrowing of the proximal left lower lobe bronchus   secondary to mass effect by the left upper lobe mass.   3. Small left-sided pleural effusion with adjacent atelectasis.   4. Mild upper lung predominant emphysematous changes.   5. Multiple peripheral wedge-shaped areas of hypoattenuation   throughout the bilateral kidneys, favored to represent multifocal   renal infarcts, however acute pyelonephritis not entirely excluded.   Metastatic renal involvement can not be excluded. Clinical   correlation and urinalysis recommended for further evaluation. Please   see same day separately dictated CTA abdomen/pelvis for further   characterization.   6. Additional chronic findings as above.      CT A/P 11/17/2023:  Exam is significantly limited due to diffuse edema and abdominopelvic   fluid. All findings are reported within these limitations.   1. Partial visualization of what appears to be a filling defect of a   left pulmonary vein. This is incompletely evaluated on this exam, but   may represent volume averaging versus a true luminal thrombus.   Recommend CTA PE to exclude the possibility of a pulmonary vein   embolus.   2. Within the significant limitations of this exam, there is no   direct evidence to suggest active extravasation within the GI tract,   acute mesenteric ischemia, or acute diverticulitis.   3. Partial visualization of an ill-defined heterogenous mass within   the left upper lobe, with hypodense areas suggestive of necrosis.   There is suggestion that this mass is increased in size  compared to   prior exam.     EGD 11/14/2022 at [facility]  The examined esophagus was normal.        There is no endoscopic evidence of bleeding in the stomach without        old or fresh blood.        The entire examined stomach was normal.        A single angiodysplastic lesion without bleeding was found in the        second portion of the duodenum. APC was done to the duodenal        angiodysplastic lesion.        There is no endoscopic evidence of bleeding in the entire examined        duodenum.   Impression:            - Normal esophagus.                          - Normal stomach.                          - A single non-bleeding angiodysplastic lesion in                          the duodenum s/p APC.                          - No specimens collected.        Past Medical History:  No past medical history on file.    Past Surgical History:  Past Surgical History:   Procedure Laterality Date    CT ABDOMEN PELVIS ANGIOGRAM W AND/OR WO IV CONTRAST  11/17/2023    CT ABDOMEN PELVIS ANGIOGRAM W AND/OR WO IV CONTRAST 11/17/2023 [facility] CT        Social history:  Living Situation: lives alone in an apartment   Occupation: unemployed   Alcohol:   Alcohol Use: Not on file     Tobacco:   Tobacco Use: Not on file      Illicit Drugs:   Social History     Substance and Sexual Activity   Drug Use Not on file       He has no history on file for tobacco use, alcohol use, and drug use.        Family history:  No family history on file.     Allergies:  Patient has no known allergies.    Home medications:  Prior to Admission medications    Medication Sig Start Date End Date Taking? Authorizing Provider   albuterol 90 mcg/actuation inhaler INHALE 2 PUFFS EVERY 6 HOURS AS NEEDED FOR SHORTNESS OF BREATH 7/6/23 7/5/24  John Sesay MD   amLODIPine (Norvasc) 5 mg tablet TAKE 1 TABLET BY MOUTH ONCE DAILY 7/6/23 7/5/24  John Sesay MD   azithromycin (Zithromax) 500 mg tablet TAKE 1 TABLET BY MOUTH EVERY 24 HOURS 7/6/23 7/5/24  John WARE  "MD Shama   ferrous sulfate 325 (65 Fe) MG tablet TAKE 1 TABLET BY MOUTH ONCE DAILY 7/6/23 7/5/24  John Sesay MD   fluticasone (Flovent) 110 mcg/actuation inhaler Inhale 2 puffs 2 times a day. 12/6/18 12/10/23  Historical Provider, MD   pantoprazole (ProtoNix) 40 mg EC tablet TAKE 1 TABLET BY MOUTH ONCE DAILY 7/6/23 7/5/24  John Sesay MD   tamsulosin (Flomax) 0.4 mg 24 hr capsule TAKE 1 CAPSULE BY MOUTH EVERY NIGHT AT BEDTIME 7/6/23 7/5/24  John Sesay MD   tiotropium-olodateroL (Stiolto Respimat) 2.5-2.5 mcg/actuation mist inhaler INHALE 2 PUFFS ONCE DAILY 8/18/23 8/17/24  Lalit Cortez MD          Objective   Vital signs:  Blood pressure 164/87, pulse 79, temperature 36.1 °C (97 °F), temperature source Temporal, resp. rate 12, height 1.753 m (5' 9\"), weight 70.3 kg (155 lb), SpO2 98 %.    Physical Exam  Constitutional:       General: He is not in acute distress.     Appearance: He is not toxic-appearing.   HENT:      Head: Normocephalic and atraumatic.      Comments: Dried blood in mouth     Mouth/Throat:      Mouth: Mucous membranes are moist.      Pharynx: Oropharynx is clear. No oropharyngeal exudate or posterior oropharyngeal erythema.   Eyes:      General: No scleral icterus.     Conjunctiva/sclera: Conjunctivae normal.      Pupils: Pupils are equal, round, and reactive to light.   Cardiovascular:      Rate and Rhythm: Normal rate and regular rhythm.      Heart sounds: No murmur heard.     No gallop.   Pulmonary:      Effort: Pulmonary effort is normal. No respiratory distress.      Breath sounds: Wheezing and rhonchi present. No rales.   Abdominal:      General: There is no distension.      Tenderness: There is abdominal tenderness. There is no guarding.   Musculoskeletal:         General: No swelling.      Cervical back: Normal range of motion and neck supple.      Right lower leg: No edema.      Left lower leg: No edema.   Lymphadenopathy:      Cervical: No cervical adenopathy. "   Skin:     General: Skin is warm and dry.   Neurological:      General: No focal deficit present.      Mental Status: He is oriented to person, place, and time.      Comments: Pupils pinpoint    Psychiatric:         Mood and Affect: Mood normal.         Behavior: Behavior normal.         Thought Content: Thought content normal.           Meds:  No current facility-administered medications on file prior to encounter.     Current Outpatient Medications on File Prior to Encounter   Medication Sig Dispense Refill    albuterol 90 mcg/actuation inhaler INHALE 2 PUFFS EVERY 6 HOURS AS NEEDED FOR SHORTNESS OF BREATH 8.5 g 2    amLODIPine (Norvasc) 5 mg tablet TAKE 1 TABLET BY MOUTH ONCE DAILY 30 tablet 2    azithromycin (Zithromax) 500 mg tablet TAKE 1 TABLET BY MOUTH EVERY 24 HOURS 3 tablet 0    ferrous sulfate 325 (65 Fe) MG tablet TAKE 1 TABLET BY MOUTH ONCE DAILY 30 tablet 2    fluticasone (Flovent) 110 mcg/actuation inhaler Inhale 2 puffs 2 times a day.      pantoprazole (ProtoNix) 40 mg EC tablet TAKE 1 TABLET BY MOUTH ONCE DAILY 30 tablet 2    tamsulosin (Flomax) 0.4 mg 24 hr capsule TAKE 1 CAPSULE BY MOUTH EVERY NIGHT AT BEDTIME 30 capsule 2    tiotropium-olodateroL (Stiolto Respimat) 2.5-2.5 mcg/actuation mist inhaler INHALE 2 PUFFS ONCE DAILY 12 g 0       Labs:  Last CBC:  Lab Results   Component Value Date    WBC 10.2 11/17/2023    HGB 9.0 (L) 11/17/2023    HCT 30.8 (L) 11/17/2023    MCV 58 (L) 11/17/2023     (H) 11/17/2023       Last RFP:  Lab Results   Component Value Date    GLUCOSE 111 (H) 11/17/2023    CALCIUM 9.6 11/17/2023     (L) 11/17/2023    K 5.2 11/17/2023    CO2 23 11/17/2023     11/17/2023    BUN 29 (H) 11/17/2023    CREATININE 2.23 (H) 11/17/2023       Last LFTs:  Lab Results   Component Value Date    ALT 4 (L) 11/17/2023    AST 20 11/17/2023    ALKPHOS 35 11/17/2023    BILITOT 0.4 11/17/2023       Last coags:  Lab Results   Component Value Date    INR 1.1 08/17/2023        Micro/culture data:  No results found for the last 90 days.       Assessment/Plan     Assessment/Plan:   Noel Aquino is a 78 y.o. male presenting with 3 year history of abdominal pain and hemoptysis vs hematemesis admitted for further evaluation. Admission Hgb 5.9 incremented to 9.0 after 2U PRBCs in ED. Pt remained HDS. Received dose of ceftriaxone in ED. CTPE showing large mass in MESHA with possible tumor thrombus of left superior pulmonary vein. CTA A/P showed no active extravasation within the GI tract. Pulm consulted. GI consulted. Will follow up further recs.       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   :: HDS   - will consult vascular med in AM    #abdominal pain   :: unclear etiology  :: CT with difuse edema and abdominopelvic fluid   - moderate stool burden   - will try CLD and bowel regimen     #UTI  :: UA with LE, 11-20 WBCs   - ceftriaxone 1g q24h pending cx       F: Replete PRN  E: Replete PRN  N: CLD  DVT Ppx: Heparin SubQ   GI Ppx: IV Pantoprazole BID  Access/Lines: 2 large bore IVS   Abx: ceftriaxone   O2: None       Code status: Full Code  Emergency contact: none       Principal Problem:    Anemia         Shahid Phillips MD  Internal Medicine, PGY-2    Pt to be staffed in AM with attending.

## 2023-11-17 NOTE — PROGRESS NOTES
Emergency Medicine Transition of Care Note.    I received Noel Aquino in signout from Dr. Buchanan.  Please see the previous ED provider note for all HPI, PE and MDM up to the time of signout at 0700. This is in addition to the primary record.    In brief Noel Aquino is an 78 y.o. male with a past medical history of left upper lobe mass, PE, COPD, CKD, duodenal AVM, GI bleed, polysubstance use disorder, JOSÉ MIGUEL, HTN, and BPH presenting for hematemesis.  Patient has been noted to have intermittent episodes of hematemesis with worsening abdominal pain and nausea.  Patient's work-up significant for tachycardia of 132, anemia of 5.9 with 2 units of PRBCs typed and crossed, elevated BUN at 29 and creatinine at 2.23, and elevated lactate of 4.9.  Patient ordered administered Zofran, Protonix, Dilaudid, and ceftriaxone.    Chief Complaint   Patient presents with    Abdominal Pain     At the time of signout we were awaiting: CTA AP    Diagnoses as of 11/17/23 1514   Anemia   Hematemesis with nausea   Thrombosis       Medical Decision Making  CTA AP significant for filling defect of the left pulmonary vein with no other acute intra-abdominal or pelvic findings.  CT PE was ordered with findings significant for large heterogenous mass of the left upper lobe, tumor thrombus of the left superior pulmonary vein, small left-sided pleural effusion with adjacent atelectasis, and multiple wedge-shaped areas of hypoattenuation throughout the bilateral kidneys.  Patient has remained stable during reevaluation.  Patient denied noting any history of malignancy.  Patient has received his 2 units of PRBCs with H&H pending.  Discussed ED findings and admission with patient.  Patient stated understanding agree with the plan.  All questions were answered.  Discussed patient presentation with admitting team.  Patient admitted to medicine in stable condition.    Final diagnoses:   [D64.9] Anemia   [K92.0] Hematemesis with nausea    [I82.90] Thrombosis     Patient presentation was discussed with the attending physician who was in agreement with the plan.      Procedure  Procedures    Joel Hector MD

## 2023-11-17 NOTE — H&P
History Of Present Illness  Noel Aquino is a 78 y.o. male with PMHx of left upper lobe mass, PE, COPD, CKD, duodenal AV malformation, GI bleed, polysubstance use disorder (etoh, tobacco, cocaine),  JOSÉ MIGUEL, HTN, BPH presenting to [facility] 11/17 with a chief complaint of hematemesis and abdominal pain. He is a very challenging and confrontational historian and refuses to answer questions and continually demands food at the time of interview, so addition history is obtained via chart review. The patient reports that he has had many hematemesis over the past couple years, though has difficulty describing how frequently he has these episodes but states he last vomited blood 2 days ago with ~1 small cup (?), though per ED notes he had 2 episodes of hematemesis this morning. In addition, he is complaining of chronic abdominal pain rated at 9/10, but refuses to elaborate on onset, location, radiation, or any aggravating or relieving factors. He states he does not take any medications at home and did not remember getting dilaudid here in the ED. He denies any fever, chills, dizziness, lightheadedness, chest pain, shortness of breath, nausea, melena, or hematochezia but states he feels constipated with his last BM 2 days ago which was large, hard and brown. No changes to his urinary symptoms noted. The patient admits to a history of alcohol use, but states he has not had anything to drink in years, and states he continues to smoke and used cocaine previously, but refuses to elaborate and continually demands food as he has not eaten in many days.     Per chart review patient was hospitalized between 7/2/2023 and 7/6/2023 for abdominal pain and SOB during which time CT showed findings concerning for mass of the MESHA of the lungs with no acute findings seen on CTAP. He was admitted for treatment and management of HEMANT, anemia and also had CT guided biopsy of the lung mass 7/3/2023 which showed small portion of fibroelastic nodule  with focal necrosis and chronic inflammation, admixed with blood. He later underwent EBUS 2023 which was concerning for SCC. It was attempted to set him up with a PCP and Pulmonology post-discharge but he did not follow-up in the interim.      ED Course:  Vitals on presentation: T 36.9 °C (98.5 °F)  HR (!) 132  /76  RR 15  O2 96 % None (Room air)   Initial laboratory evaluation showed:  CBC: WBC 10.2, Hb 5.9 (BL~7), Plt 616  CMP: Na 135, K 5.2 (hemolyzed), Cl 104, CO2 23, BUN elevated to 29, Cr 2.23 (BL~2), EGFR 30, Ca 9.6, Alb 2.7, AST 20, ALT 4, ALP 35, tBili 0.4, Mag 7.1  Lipase 42   VB.38/41/24.3/Lac1.9  EtOH <10  UDS positive for Cocain   UA with 3+ Leuk Esterace, WBC 11-20, Spec Grav 1.048    Imaging  CT angio AP 2023:  IMPRESSION:  Exam is significantly limited due to diffuse edema and abdominopelvic  fluid. All findings are reported within these limitations.  1. Partial visualization of what appears to be a filling defect of a  left pulmonary vein. This is incompletely evaluated on this exam, but  may represent volume averaging versus a true luminal thrombus.  Recommend CTA PE to exclude the possibility of a pulmonary vein  embolus.  2. Within the significant limitations of this exam, there is no  direct evidence to suggest active extravasation within the GI tract,  acute mesenteric ischemia, or acute diverticulitis.  3. Partial visualization of an ill-defined heterogenous mass within  the left upper lobe, with hypodense areas suggestive of necrosis.  There is suggestion that this mass is increased in size compared to  prior exam.      The above findings and recommendations were discussed via telephone  with Joel Hector MD on 2023, 7:47 a.m. Preliminary change to  reflect the possibility of pulmonary vein embolism communicated to  Dr. Ronquillo via secure electronic message with verification at [phone]. The  recommendation is the same.    CT angio Chest 2023:   IMPRESSION:  1.  Significant interval increase in size of large heterogenous mass  centered within left upper lobe with associated mass effect on the  adjacent vascular structures, including significant narrowing of the  left main pulmonary artery and left interlobar artery with non  opacification of the segmental arteries throughout the left lower  lobe, as well as significant narrowing and obliteration of the left  superior pulmonary vein. There is a rounded hypodense filling defect  within the left superior pulmonary vein which is contiguous with the  left upper lobe soft tissue mass, consistent with tumor thrombus.  There are otherwise no discrete filling defects to suggest pulmonary  embolism.  2. Significant narrowing of the proximal left lower lobe bronchus  secondary to mass effect by the left upper lobe mass.  3. Small left-sided pleural effusion with adjacent atelectasis.  4. Mild upper lung predominant emphysematous changes.  5. Multiple peripheral wedge-shaped areas of hypoattenuation  throughout the bilateral kidneys, favored to represent multifocal  renal infarcts, however acute pyelonephritis not entirely excluded.  Clinical correlation and urinalysis recommended for further  evaluation. Please see same day separately dictated CTA  abdomen/pelvis for further characterization.  6. Additional chronic findings as above.    Interventions:  Patient was provided with 2u pRBCs and given Zofran, Protonix, Diluadid 0.5mg and ceftriaxone before being admitted to medicine for further management.    10 point ROS performed and negative unless stated above.     Past Medical History  MESHA Lung Mass, SCC  PE  COPD  CKD3b/4  Duodenal AVM   GIB  Polysubstance Use Disorder (etoh, tobacco, cocaine),    JOSÉ MIGUEL   HTN  BPH    Medications  Per pharmacy med rec, though patient denies having any at home     albuterol 90 mcg/actuation inhaler Unknown   No   Sig: INHALE 2 PUFFS EVERY 6 HOURS AS NEEDED FOR SHORTNESS OF BREATH   amLODIPine (Norvasc) 5  mg tablet Unknown   No   Sig: TAKE 1 TABLET BY MOUTH ONCE DAILY   azithromycin (Zithromax) 500 mg tablet Unknown   No   Sig: TAKE 1 TABLET BY MOUTH EVERY 24 HOURS   ferrous sulfate 325 (65 Fe) MG tablet Unknown   No   Sig: TAKE 1 TABLET BY MOUTH ONCE DAILY   fluticasone (Flovent) 110 mcg/actuation inhaler Unknown   Yes   Sig: Inhale 2 puffs 2 times a day.   pantoprazole (ProtoNix) 40 mg EC tablet Unknown   No   Sig: TAKE 1 TABLET BY MOUTH ONCE DAILY   tamsulosin (Flomax) 0.4 mg 24 hr capsule Unknown   No   Sig: TAKE 1 CAPSULE BY MOUTH EVERY NIGHT AT BEDTIME   tiotropium-olodateroL (Stiolto Respimat) 2.5-2.5 mcg/actuation mist inhaler Unknown   No   Sig: INHALE 2 PUFFS ONCE DAILY      Facility-Administered Medications: None      Surgical History  Left Knee Arthroscopy     Social History  Drugs/Illicits:   Alcohol:  long history of alcohol use, states he has not used in many years.   Smoker: 50 pack year smoking history, current active smoker unclear how many ppd   Situation: Lives alone in a basement of the house or apartment    Family History  M - HTN      Allergies  Patient has no known allergies.     Physical Exam  Physical Exam  Constitutional:       General: He is not in acute distress.     Appearance: He is ill-appearing. He is not diaphoretic.      Comments: Awake, upset but occasionally appears to be less responsive during interview and have upper airway wheezing. Cachexic appearing.   HENT:      Head: Normocephalic and atraumatic.      Comments: Dried blood noted on the inside of the the teeth      Nose: Rhinorrhea present. No congestion.      Mouth/Throat:      Mouth: Mucous membranes are dry.   Eyes:      General: No scleral icterus.     Comments: Pupils are <3mm and not particularly reactive to light bilaterally   Cardiovascular:      Rate and Rhythm: Normal rate and regular rhythm.      Heart sounds: Murmur heard.      Systolic murmur is present with a grade of 1/6.      No S3 sounds.   Pulmonary:       Effort: Pulmonary effort is normal.      Comments: Upper airway wheezing with diffuse rhonchi without crackles   Abdominal:      General: Bowel sounds are normal. There is no distension.      Palpations: There is no shifting dullness, fluid wave, hepatomegaly or splenomegaly.      Tenderness: There is no right CVA tenderness or left CVA tenderness.      Comments: Diffuse tenderness to deep palpation, + voluntary guarding, +rebound   Musculoskeletal:      Cervical back: Edema present. Normal range of motion.      Right lower leg: No edema.      Left lower leg: No edema.   Lymphadenopathy:      Cervical: No cervical adenopathy.   Skin:     General: Skin is cool and dry.      Capillary Refill: Capillary refill takes less than 2 seconds.      Coloration: Skin is ashen.      Last Recorded Vitals  BP (!) 172/92   Pulse 78   Temp 36.1 °C (97 °F) (Temporal)   Resp 20   Wt 70.3 kg (155 lb)   SpO2 96%       Labs  Lab Results   Component Value Date    WBC 10.2 11/17/2023    HGB 9.0 (L) 11/17/2023    HCT 30.8 (L) 11/17/2023    MCV 58 (L) 11/17/2023     (H) 11/17/2023      Results from last 7 days   Lab Units 11/17/23  0406   SODIUM mmol/L 135*   POTASSIUM mmol/L 5.2   CHLORIDE mmol/L 104   CO2 mmol/L 23   BUN mg/dL 29*   CREATININE mg/dL 2.23*   CALCIUM mg/dL 9.6      Results from last 7 days   Lab Units 11/17/23  0406   ALK PHOS U/L 35   BILIRUBIN TOTAL mg/dL 0.4   PROTEIN TOTAL g/dL 7.1   ALT U/L 4*   AST U/L 20      EGD 11/2022:     The examined esophagus was normal.        There is no endoscopic evidence of bleeding in the stomach without        old or fresh blood.        The entire examined stomach was normal.        A single angiodysplastic lesion without bleeding was found in the        second portion of the duodenum. APC was done to the duodenal        angiodysplastic lesion.        There is no endoscopic evidence of bleeding in the entire examined          duodenum.   Assessment/Plan   77 y/o M with PMHx of  left upper lobe mass, 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 He was HDS though vitals were significant for tachycardia to 132 and labs showed Hb 5.9 on presentation which improved/incremented more than appropriately to 9 after 2u pRBC. He was also started on ceftriaxone. He has elevated Cr to 2.23 from his BL ~2, with BUN elevated to 29. CT angio AP was limited due to diffuse edema and abdominopelvic fluid but showed filling defect of the left pulmonary vein concerning for luminal thrombus though showed no active extravasation within GI tract. CT angio Chest showed interval increase in size of MESHA mass with mass effect on left main pulmonary artery, left interlobar artery, obliteration of left superior pulmonary vein, rounded hypodense filling defect consisted with tumor thrombus. Pulmonology and GI were consulted, will follow up further for reccs.     #Suspected Hemoptysis vs. Hematemesis, unclear source    #Acute on Chronic Anemia, Microcytic   ::Patient presents with complaints of chronic bloody emesis and poor po intake, last ~2 days ago, with no further bleeding since admission, melena or hematochezia  ::Hb on presentation 5.9 -> 9.0 after 2u (BL ~7)   ::Imaging with MESHA mass with mass effect on left main pulmonary artery, left interlobar artery, left superior pulmonary vein, with no extravasating within GI tract  ::Last EGD 2022 with single duodenal AVM s/p APC   - Gastroenterology consulted: IV PPI, Trend CBC, 2 large bores, CLD and ADAT, not good candidate for sedation/EGD iso below  - Pulmonology consulted: No intervention recommended, inhaled TXA as temporizing measure for hemoptysis     #EMSHA mass, SCC, with mass effect  #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   ::CT angio chest: interval increase in size of  MESHA mass with mass effect on left main pulmonary artery, left interlobar artery, obliteration of left superior pulmonary vein, rounded hypodense filling defect consisted with tumor thrombus  ::EBUS Surgical path 8/2023: SCC   - Oncology consult in AM  - Vascular Medicine consult in AM     #Abdominal Pain, unclear etiology   #Constipation   ::CT angio AP: diffuse edema and abdominopelvic fluid, moderate stool burdn  - Continue Ceftriaxone for now   - CLD   - Bowel Regimen     #COPD  - Continue Stiolto   - Duonebs Q4H prn     #HTN  - Holding home amlodipine 5 iso hypotension on admission    #BPH   - Holding home tamsulosin iso hypotension on admission    F : as needed  E: replete lytes prn,  N: CLD    DVT prophylaxis: heparin sq  GI prophylaxis: PPI    Code Status: Full (discussed with patient at bedside upon admission)     Brian Barnett MD  PGY-1, Internal Medicine

## 2023-11-18 ENCOUNTER — HOSPITAL ENCOUNTER (OUTPATIENT)
Dept: CARDIOLOGY | Facility: HOSPITAL | Age: 78
Discharge: HOME | End: 2023-11-18

## 2023-11-18 LAB
ALBUMIN SERPL BCP-MCNC: 2.5 G/DL (ref 3.4–5)
ANION GAP SERPL CALC-SCNC: 14 MMOL/L (ref 10–20)
BACTERIA UR CULT: NORMAL
BASOPHILS # BLD AUTO: 0.04 X10*3/UL (ref 0–0.1)
BASOPHILS NFR BLD AUTO: 0.4 %
BUN SERPL-MCNC: 25 MG/DL (ref 6–23)
BURR CELLS BLD QL SMEAR: NORMAL
CALCIUM SERPL-MCNC: 9.5 MG/DL (ref 8.6–10.6)
CHLORIDE SERPL-SCNC: 108 MMOL/L (ref 98–107)
CO2 SERPL-SCNC: 22 MMOL/L (ref 21–32)
CREAT SERPL-MCNC: 1.96 MG/DL (ref 0.5–1.3)
EOSINOPHIL # BLD AUTO: 0.13 X10*3/UL (ref 0–0.4)
EOSINOPHIL NFR BLD AUTO: 1.1 %
ERYTHROCYTE [DISTWIDTH] IN BLOOD BY AUTOMATED COUNT: 27.4 % (ref 11.5–14.5)
GFR SERPL CREATININE-BSD FRML MDRD: 35 ML/MIN/1.73M*2
GLUCOSE SERPL-MCNC: 110 MG/DL (ref 74–99)
HCT VFR BLD AUTO: 32.2 % (ref 41–52)
HGB BLD-MCNC: 9.1 G/DL (ref 13.5–17.5)
HYPOCHROMIA BLD QL SMEAR: NORMAL
IMM GRANULOCYTES # BLD AUTO: 0.04 X10*3/UL (ref 0–0.5)
IMM GRANULOCYTES NFR BLD AUTO: 0.4 % (ref 0–0.9)
LYMPHOCYTES # BLD AUTO: 0.65 X10*3/UL (ref 0.8–3)
LYMPHOCYTES NFR BLD AUTO: 5.7 %
MAGNESIUM SERPL-MCNC: 2.03 MG/DL (ref 1.6–2.4)
MCH RBC QN AUTO: 18.3 PG (ref 26–34)
MCHC RBC AUTO-ENTMCNC: 28.3 G/DL (ref 32–36)
MCV RBC AUTO: 65 FL (ref 80–100)
MONOCYTES # BLD AUTO: 0.55 X10*3/UL (ref 0.05–0.8)
MONOCYTES NFR BLD AUTO: 4.8 %
NEUTROPHILS # BLD AUTO: 9.97 X10*3/UL (ref 1.6–5.5)
NEUTROPHILS NFR BLD AUTO: 87.6 %
NRBC BLD-RTO: 0 /100 WBCS (ref 0–0)
PHOSPHATE SERPL-MCNC: 3 MG/DL (ref 2.5–4.9)
PLATELET # BLD AUTO: 614 X10*3/UL (ref 150–450)
POLYCHROMASIA BLD QL SMEAR: NORMAL
POTASSIUM SERPL-SCNC: 4.6 MMOL/L (ref 3.5–5.3)
RBC # BLD AUTO: 4.97 X10*6/UL (ref 4.5–5.9)
RBC MORPH BLD: NORMAL
SODIUM SERPL-SCNC: 139 MMOL/L (ref 136–145)
TARGETS BLD QL SMEAR: NORMAL
WBC # BLD AUTO: 11.4 X10*3/UL (ref 4.4–11.3)

## 2023-11-18 PROCEDURE — 85025 COMPLETE CBC W/AUTO DIFF WBC: CPT

## 2023-11-18 PROCEDURE — 36415 COLL VENOUS BLD VENIPUNCTURE: CPT

## 2023-11-18 PROCEDURE — 99232 SBSQ HOSP IP/OBS MODERATE 35: CPT | Performed by: STUDENT IN AN ORGANIZED HEALTH CARE EDUCATION/TRAINING PROGRAM

## 2023-11-18 PROCEDURE — 83735 ASSAY OF MAGNESIUM: CPT

## 2023-11-18 PROCEDURE — C9113 INJ PANTOPRAZOLE SODIUM, VIA: HCPCS

## 2023-11-18 PROCEDURE — 96372 THER/PROPH/DIAG INJ SC/IM: CPT

## 2023-11-18 PROCEDURE — 93005 ELECTROCARDIOGRAM TRACING: CPT

## 2023-11-18 PROCEDURE — 93010 ELECTROCARDIOGRAM REPORT: CPT | Performed by: INTERNAL MEDICINE

## 2023-11-18 PROCEDURE — 1200000002 HC GENERAL ROOM WITH TELEMETRY DAILY

## 2023-11-18 PROCEDURE — 80069 RENAL FUNCTION PANEL: CPT

## 2023-11-18 PROCEDURE — 99222 1ST HOSP IP/OBS MODERATE 55: CPT | Performed by: STUDENT IN AN ORGANIZED HEALTH CARE EDUCATION/TRAINING PROGRAM

## 2023-11-18 PROCEDURE — 99232 SBSQ HOSP IP/OBS MODERATE 35: CPT

## 2023-11-18 PROCEDURE — 2500000004 HC RX 250 GENERAL PHARMACY W/ HCPCS (ALT 636 FOR OP/ED)

## 2023-11-18 RX ADMIN — CEFTRIAXONE SODIUM 1 G: 1 INJECTION, SOLUTION INTRAVENOUS at 05:56

## 2023-11-18 RX ADMIN — SODIUM CHLORIDE, SODIUM LACTATE, POTASSIUM CHLORIDE, AND CALCIUM CHLORIDE 1000 ML: 600; 310; 30; 20 INJECTION, SOLUTION INTRAVENOUS at 09:30

## 2023-11-18 RX ADMIN — PANTOPRAZOLE SODIUM 40 MG: 40 INJECTION, POWDER, FOR SOLUTION INTRAVENOUS at 22:03

## 2023-11-18 RX ADMIN — HEPARIN SODIUM 5000 UNITS: 5000 INJECTION INTRAVENOUS; SUBCUTANEOUS at 17:15

## 2023-11-18 SDOH — ECONOMIC STABILITY: TRANSPORTATION INSECURITY
IN THE PAST 12 MONTHS, HAS LACK OF TRANSPORTATION KEPT YOU FROM MEETINGS, WORK, OR FROM GETTING THINGS NEEDED FOR DAILY LIVING?: PATIENT DECLINED

## 2023-11-18 SDOH — SOCIAL STABILITY: SOCIAL INSECURITY: ABUSE: ADULT

## 2023-11-18 SDOH — SOCIAL STABILITY: SOCIAL INSECURITY: ARE THERE ANY APPARENT SIGNS OF INJURIES/BEHAVIORS THAT COULD BE RELATED TO ABUSE/NEGLECT?: NO

## 2023-11-18 SDOH — SOCIAL STABILITY: SOCIAL INSECURITY: DO YOU FEEL UNSAFE GOING BACK TO THE PLACE WHERE YOU ARE LIVING?: NO

## 2023-11-18 SDOH — SOCIAL STABILITY: SOCIAL INSECURITY: DOES ANYONE TRY TO KEEP YOU FROM HAVING/CONTACTING OTHER FRIENDS OR DOING THINGS OUTSIDE YOUR HOME?: NO

## 2023-11-18 SDOH — SOCIAL STABILITY: SOCIAL INSECURITY: WERE YOU ABLE TO COMPLETE ALL THE BEHAVIORAL HEALTH SCREENINGS?: YES

## 2023-11-18 SDOH — SOCIAL STABILITY: SOCIAL INSECURITY: HAS ANYONE EVER THREATENED TO HURT YOUR FAMILY OR YOUR PETS?: NO

## 2023-11-18 SDOH — SOCIAL STABILITY: SOCIAL INSECURITY: ARE YOU OR HAVE YOU BEEN THREATENED OR ABUSED PHYSICALLY, EMOTIONALLY, OR SEXUALLY BY ANYONE?: NO

## 2023-11-18 SDOH — SOCIAL STABILITY: SOCIAL INSECURITY: DO YOU FEEL ANYONE HAS EXPLOITED OR TAKEN ADVANTAGE OF YOU FINANCIALLY OR OF YOUR PERSONAL PROPERTY?: NO

## 2023-11-18 SDOH — ECONOMIC STABILITY: HOUSING INSECURITY
IN THE LAST 12 MONTHS, WAS THERE A TIME WHEN YOU DID NOT HAVE A STEADY PLACE TO SLEEP OR SLEPT IN A SHELTER (INCLUDING NOW)?: PATIENT REFUSED

## 2023-11-18 SDOH — SOCIAL STABILITY: SOCIAL INSECURITY: HAVE YOU HAD THOUGHTS OF HARMING ANYONE ELSE?: NO

## 2023-11-18 SDOH — ECONOMIC STABILITY: INCOME INSECURITY: IN THE LAST 12 MONTHS, WAS THERE A TIME WHEN YOU WERE NOT ABLE TO PAY THE MORTGAGE OR RENT ON TIME?: PATIENT REFUSED

## 2023-11-18 SDOH — ECONOMIC STABILITY: INCOME INSECURITY: HOW HARD IS IT FOR YOU TO PAY FOR THE VERY BASICS LIKE FOOD, HOUSING, MEDICAL CARE, AND HEATING?: PATIENT DECLINED

## 2023-11-18 SDOH — ECONOMIC STABILITY: HOUSING INSECURITY: IN THE LAST 12 MONTHS, HOW MANY PLACES HAVE YOU LIVED?: 1

## 2023-11-18 SDOH — ECONOMIC STABILITY: TRANSPORTATION INSECURITY
IN THE PAST 12 MONTHS, HAS THE LACK OF TRANSPORTATION KEPT YOU FROM MEDICAL APPOINTMENTS OR FROM GETTING MEDICATIONS?: PATIENT DECLINED

## 2023-11-18 ASSESSMENT — LIFESTYLE VARIABLES
SKIP TO QUESTIONS 9-10: 0
HOW MANY STANDARD DRINKS CONTAINING ALCOHOL DO YOU HAVE ON A TYPICAL DAY: PATIENT DECLINED
AUDIT-C TOTAL SCORE: -1
HOW OFTEN DO YOU HAVE A DRINK CONTAINING ALCOHOL: NEVER
HOW OFTEN DO YOU HAVE A DRINK CONTAINING ALCOHOL: NEVER
HOW MANY STANDARD DRINKS CONTAINING ALCOHOL DO YOU HAVE ON A TYPICAL DAY: PATIENT DECLINED
SKIP TO QUESTIONS 9-10: 0
AUDIT-C TOTAL SCORE: -1
AUDIT-C TOTAL SCORE: -1
HOW OFTEN DO YOU HAVE 6 OR MORE DRINKS ON ONE OCCASION: PATIENT DECLINED
SUBSTANCE_ABUSE_PAST_12_MONTHS: NO
PRESCIPTION_ABUSE_PAST_12_MONTHS: NO
SUBSTANCE_ABUSE_PAST_12_MONTHS: NO
PRESCIPTION_ABUSE_PAST_12_MONTHS: NO
AUDIT-C TOTAL SCORE: -1
HOW OFTEN DO YOU HAVE 6 OR MORE DRINKS ON ONE OCCASION: PATIENT DECLINED

## 2023-11-18 ASSESSMENT — ACTIVITIES OF DAILY LIVING (ADL)
GROOMING: NEEDS ASSISTANCE
BATHING: NEEDS ASSISTANCE
HEARING - LEFT EAR: FUNCTIONAL
ADEQUATE_TO_COMPLETE_ADL: YES
WALKS IN HOME: NEEDS ASSISTANCE
TOILETING: NEEDS ASSISTANCE
LACK_OF_TRANSPORTATION: PATIENT DECLINED
JUDGMENT_ADEQUATE_SAFELY_COMPLETE_DAILY_ACTIVITIES: YES
PATIENT'S MEMORY ADEQUATE TO SAFELY COMPLETE DAILY ACTIVITIES?: YES
DRESSING YOURSELF: NEEDS ASSISTANCE
FEEDING YOURSELF: NEEDS ASSISTANCE
LACK_OF_TRANSPORTATION: PATIENT DECLINED
HEARING - RIGHT EAR: FUNCTIONAL

## 2023-11-18 ASSESSMENT — COGNITIVE AND FUNCTIONAL STATUS - GENERAL
TOILETING: A LITTLE
TURNING FROM BACK TO SIDE WHILE IN FLAT BAD: A LITTLE
WALKING IN HOSPITAL ROOM: TOTAL
DRESSING REGULAR UPPER BODY CLOTHING: A LITTLE
TURNING FROM BACK TO SIDE WHILE IN FLAT BAD: A LITTLE
MOVING FROM LYING ON BACK TO SITTING ON SIDE OF FLAT BED WITH BEDRAILS: A LITTLE
HELP NEEDED FOR BATHING: A LITTLE
TOILETING: A LITTLE
PATIENT BASELINE BEDBOUND: NO
DRESSING REGULAR LOWER BODY CLOTHING: A LITTLE
PERSONAL GROOMING: A LITTLE
CLIMB 3 TO 5 STEPS WITH RAILING: A LOT
STANDING UP FROM CHAIR USING ARMS: A LITTLE
MOVING FROM LYING ON BACK TO SITTING ON SIDE OF FLAT BED WITH BEDRAILS: A LITTLE
HELP NEEDED FOR BATHING: A LITTLE
DAILY ACTIVITIY SCORE: 19
PERSONAL GROOMING: A LITTLE
DAILY ACTIVITIY SCORE: 19
DRESSING REGULAR UPPER BODY CLOTHING: A LITTLE
STANDING UP FROM CHAIR USING ARMS: A LITTLE
DRESSING REGULAR LOWER BODY CLOTHING: A LITTLE
MOBILITY SCORE: 17
MOVING TO AND FROM BED TO CHAIR: A LITTLE
MOVING TO AND FROM BED TO CHAIR: A LITTLE
CLIMB 3 TO 5 STEPS WITH RAILING: TOTAL
MOBILITY SCORE: 14
WALKING IN HOSPITAL ROOM: A LITTLE

## 2023-11-18 ASSESSMENT — PAIN SCALES - GENERAL
PAINLEVEL_OUTOF10: 0 - NO PAIN
PAINLEVEL_OUTOF10: 0 - NO PAIN

## 2023-11-18 ASSESSMENT — PAIN - FUNCTIONAL ASSESSMENT: PAIN_FUNCTIONAL_ASSESSMENT: 0-10

## 2023-11-18 ASSESSMENT — PATIENT HEALTH QUESTIONNAIRE - PHQ9
SUM OF ALL RESPONSES TO PHQ9 QUESTIONS 1 & 2: 0
2. FEELING DOWN, DEPRESSED OR HOPELESS: NOT AT ALL
1. LITTLE INTEREST OR PLEASURE IN DOING THINGS: NOT AT ALL
1. LITTLE INTEREST OR PLEASURE IN DOING THINGS: NOT AT ALL

## 2023-11-18 ASSESSMENT — COLUMBIA-SUICIDE SEVERITY RATING SCALE - C-SSRS
1. IN THE PAST MONTH, HAVE YOU WISHED YOU WERE DEAD OR WISHED YOU COULD GO TO SLEEP AND NOT WAKE UP?: NO
2. HAVE YOU ACTUALLY HAD ANY THOUGHTS OF KILLING YOURSELF?: NO

## 2023-11-18 NOTE — PROGRESS NOTES
Gastroenterology Consult Service Progress Note  Department of Gastroenterology & Hepatology  [facility]    [facility]  Date of Service  November 18, 2023   Patient: Noel Aquino    Medical Record: [id number]    Interval History: Patient with no further hematemesis. Tolerating PO diet well.     Physical Exam:  Alert and oriented x 3  CTAB  RRR  Abd soft, NT, ND  Skin warm and dry    Vital Signs:    Vitals:    11/18/23 0200 11/18/23 0300 11/18/23 0800 11/18/23 0900   BP: 143/74 (!) 157/128 124/80 138/84   BP Location: Right arm Right arm     Patient Position: Lying Lying     Pulse: 86 101 93 (!) 116   Resp: 16 16 16 18   Temp:       TempSrc:       SpO2: 99% 99% 97% 98%   Weight:       Height:             Intake/Output Summary (Last 24 hours) at 11/18/2023 1014  Last data filed at 11/17/2023 1414  Gross per 24 hour   Intake 320 ml   Output --   Net 320 ml           Assessment:     Noel Aquino is a 78 y.o. male with  history of left upper lobe mass (SCC on bx in 8/2023), COPD, CKD, duodenal AVM, GI bleed, polysubstance use disorder (ETOH, tobacco, cocaine), JOSÉ MIGUEL, HTN, BPH, presented by EMS to the emergency department for hematemesis. Gastroenterology is consulted for  hematemesis..     The patient is a poor historian with self reported Red colored vomiting, Hb on presentation is 5.9 ( 1 g drop from his hb on 08/17 7.1 and previously hb baseline of 7) incrementing well after 2 units prbc to 9, the patient is hemodynamically stable. With no other episodes in hospital, Unclear source of bleeding can be Hematemesis, or hemoptysis. Or can be related to ingestion of red colored food or drinks. The patient has a previous history of hematemesis in 2022 was presumed due to single non-bleeding angiodysplastic lesion in  the duodenum s/p APC. Ddx for UGI and hematemesis can include AVM, PUD, gastritis or malignant lesions. The patient is not known to have any liver cirrhosis or  varices visualized on previous EGD. No signs or symptoms of liver disease.     Updates 11/18/23  - H/H up trending 5.9 -> 9.0 over last 24 hours; requiring 2 units pRBC     # Hematemesis  # UGIB  # JOSÉ MIGUEL     Recommendations  - Given extensive malignant involvement of almost entire left chest, patient is high risk for life threatening complication if he were to receive sedation, Hence, we think close monitoring with supportive therapy would be appropriate  - IV PPI BID x 72 hours; transition to PO PPI BID x14 and then PO PPI daily  - Trend CBC daily  - Monitor vitals  - If the patient has any hematemesis with hemodynamic instability contact GI fellow on call.   - On clear liquid diet; okay to advance as tolerated from GI standpoint.      Patient seen and discussed with attending, Dr. Daniel.     Thank you for the consultation. Gastroenterology will continue to the follow the patient.   Please do not hesitate to contact me or page [phone] if there are any further questions between the weekday hours of 7 AM - 5 PM.   If there is an urgent concern during the weekend, after-hours, or holidays; then please page the [place] GI fellow [place] [phone]. Thank you.    SIGNATURE: Jaswinder Souza MD PATIENT NAME: Noel Aquino   DATE: November 18, 2023 MRN: [id number]

## 2023-11-18 NOTE — TREATMENT PLAN
Tumor thrombus is an intraluminal extension of tumor mass, rather than a true thrombus. There is no evidence that anticoagulation improves outcome in tumor thrombus   Treatment is usually directed to the cause whether surgical resection, chemotherapy or radiotherapy.  Patient can be on DVT/VTE prophylactic dose of heparin subcu, given his extensive malignant involvement close monitoring with supportive therapy and may be hospice consultation would be appropriate

## 2023-11-18 NOTE — PROGRESS NOTES
"Noel Aquino is a 78 y.o. male on day 1 of admission presenting with Anemia.    Subjective   NAEON. Patient seen at the bedside this morning at which time he denies any further episodes of hematemesis or hematochezia, stating that \"the last time it happened was so long ago that it no longer matters\". He is again primarily concerned with getting something to eat, demanding to be fed before answering further questions about his symptoms. He denies any pain at present.    10 point ROS performed and negative unless stated above.        Objective   Weight: 70.3 kg (155 lb) (11/17/23 0333)    Daily Weight  11/17/23 : 70.3 kg (155 lb)    Last Recorded Vitals  Heart Rate:  []   Temp:  [36.1 °C (97 °F)]   Resp:  [12-20]   BP: (106-172)/()   SpO2:  [94 %-100 %]      Physical Exam  Physical Exam  Constitutional:       General: He is not in acute distress.     Appearance: He is ill-appearing. He is not diaphoretic.      Comments: Awake, upset but occasionally appears to be less responsive during interview and have upper airway wheezing. Cachexic appearing.   HENT:      Head: Normocephalic and atraumatic.      Comments: Dried blood noted on the inside of the the teeth      Nose: Rhinorrhea present. No congestion.      Mouth/Throat:      Mouth: Mucous membranes are dry.   Eyes:      General: No scleral icterus.     Comments: Pupils are <3mm and not particularly reactive to light bilaterally   Cardiovascular:      Rate and Rhythm: Normal rate and regular rhythm.      Heart sounds: Murmur heard.      Systolic murmur is present with a grade of 1/6.      No S3 sounds.     Comments: No LE, UE or facial edema noted   Pulmonary:      Effort: Pulmonary effort is normal.      Comments: Upper airway wheezing with diffuse rhonchi without crackles   Abdominal:      General: Bowel sounds are normal. There is no distension.      Palpations: There is no shifting dullness, fluid wave, hepatomegaly or splenomegaly.      Tenderness: " There is no right CVA tenderness or left CVA tenderness.      Comments: Diffuse tenderness to deep palpation, + voluntary guarding, +rebound   Musculoskeletal:      Cervical back: No edema present. Normal range of motion.      Right lower leg: No edema.      Left lower leg: No edema.   Lymphadenopathy:      Cervical: No cervical adenopathy.   Skin:     General: Skin is cool and dry.      Capillary Refill: Capillary refill takes less than 2 seconds.      Coloration: Skin is ashen.     Intake/Output last 3 Shifts:  I/O last 3 completed shifts:  In: 680 (9.7 mL/kg) [Blood:680]  Out: - (0 mL/kg)   Weight: 70.3 kg     Medications  Scheduled medications  cefTRIAXone, 1 g, intravenous, q24h  tiotropium, 2 Inhalation, inhalation, Daily   And  formoterol, 20 mcg, nebulization, q12h  heparin (porcine), 5,000 Units, subcutaneous, q8h  pantoprazole, 40 mg, intravenous, BID  polyethylene glycol, 17 g, oral, Daily      Continuous medications     PRN medications  PRN medications: acetaminophen, HYDROmorphone, ipratropium-albuteroL, ondansetron, polyethylene glycol       Relevant Results    Labs  Lab Results   Component Value Date    WBC 10.2 11/17/2023    HGB 9.0 (L) 11/17/2023    HCT 30.8 (L) 11/17/2023    MCV 58 (L) 11/17/2023     (H) 11/17/2023      Results from last 7 days   Lab Units 11/17/23  0406   SODIUM mmol/L 135*   POTASSIUM mmol/L 5.2   CHLORIDE mmol/L 104   CO2 mmol/L 23   BUN mg/dL 29*   CREATININE mg/dL 2.23*   CALCIUM mg/dL 9.6      Results from last 7 days   Lab Units 11/17/23  0406   ALK PHOS U/L 35   BILIRUBIN TOTAL mg/dL 0.4   PROTEIN TOTAL g/dL 7.1   ALT U/L 4*   AST U/L 20             Imaging  No new imaging     Assessment/Plan   Principal Problem:    Anemia    77 y/o M with PMHx of left upper lobe mass, diagnosed in 8/2023 as SCC, PE, COPD, CKD, duodenal AV malformation, GI bleed, polysubstance use disorder (etoh, tobacco, cocaine),  JOSÉ MIGUEL, HTN, BPH presenting with hematemesis and abdominal pain which  appears to be chronic for him. He was HDS though vitals were significant for tachycardia to 132 and labs showed Hb 5.9 on presentation which improved/incremented more than appropriately to 9 after 2u pRBC. He was also started on ceftriaxone. He has elevated Cr to 2.23 from his BL ~2, with BUN elevated to 29. CT angio AP was limited due to diffuse edema and abdominopelvic fluid but showed filling defect of the left pulmonary vein concerning for luminal thrombus though showed no active extravasation within GI tract. CT angio Chest showed interval increase in size of MESHA mass with mass effect on left main pulmonary artery, left interlobar artery, obliteration of left superior pulmonary vein, rounded hypodense filling defect consisted with tumor thrombus. Pulmonology and GI were consulted, with no place for acute intervention. Oncology was consulted and did not feel there was need for inpatient evaluation. Radiation Oncology was consulted and recommend MRI brain and bone scan for staging.     11/19 Updates   - GI consulted, IV PPI BID x72 hrs (ends 11/19/2023), followed by PO PP BID x14 -> PO PPI daily  - Pulm consulted, unlikely hemoptysis, airway not stentable, consider nebulized TXA for repeat episode  - Vascular medicine consulted, tumor thrombus is intraluminal extension of tumor, continue SQH  - Oncology consulted, no need for inpatient eval, will set up outpatient appointment with Dr. Martinez  - Raquel consulted, MRI brain and bone scan for staging ordered - pending further reccs, may require Med Onc to come back on if no mets to consider curative co-treatment   - Tachycardic this AM, suspect hypovolemia but c/f afib due to location of tumor thrombus, EKG c/w ST and PACs, given 1L, tele CTM   - Patient lacks insight. Will likely need SW to come on board for capacity eval +/- ethics consultation     #Suspected Hemoptysis vs. Hematemesis, unclear source    #Acute on Chronic Anemia, Microcytic   ::Patient presents  with complaints of chronic bloody emesis and poor po intake, last ~2 days ago, with no further bleeding since admission, melena or hematochezia  ::Hb on presentation 5.9 -> 9.0 after 2u (BL ~7)   ::Imaging with MESHA mass with mass effect on left main pulmonary artery, left interlobar artery, left superior pulmonary vein, with no extravasating within GI tract  ::Last EGD 2022 with single duodenal AVM s/p APC   - Gastroenterology consulted: IV PPI BIDx14 -> PO PPI BIDx14 -> PO PPD daily, not good candidate for sedation/EGD iso below  - Pulmonology consulted: unlikely hemoptysis, nebulized TXA  as temporizing measure for hemoptysis      #MESHA mass, SCC, with mass effect  #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 Martinez  - Vascular medicine consulted, tumor thrombus is intraluminal extension of tumor, continue SQH  - RadOnc consulted, MRI brain and bone scan for staging ordered - pending further reccs      #Tachycardia   ::Noted pulse of 116 @9am, otherwise HDS on RA  ::Suspect hypovolemia vs. Afib based on tumor site   - EKG pending  - tele   - 1L LR bolus     #Abdominal Pain, unclear etiology   #Constipation   ::CT angio AP: diffuse edema and abdominopelvic fluid, moderate stool burdn  - Continue Ceftriaxone for now (11/17 - xx)   - Tolerating regular diet   - Bowel Regimen      #Pyuria   ::UA 11/17 appears dirty, Ucx pending  - c/w Ceftriaxone as above     #COPD  - Continue Stiolto   - Duonebs Q4H prn      #HTN  - Holding home amlodipine 5 iso hypotension on admission     #BPH   - Holding home tamsulosin  iso hypotension on admission     F : as needed  E: replete lytes prn,  N: Full Diet      DVT prophylaxis: heparin sq  GI prophylaxis: IV PPI BID      Code Status: Full (discussed with patient at bedside upon admission)      Brian Barnett MD  PGY-1, Internal Medicine

## 2023-11-18 NOTE — CARE PLAN
The patient's goals for the shift include      The clinical goals for the shift include Pt being able to eat solid food by end of shift.    Goal met. Pt was able to eat solid food and tolerate it well.     Problem: Skin  Goal: Prevent/manage excess moisture  Outcome: Progressing  Goal: Prevent/minimize sheer/friction injuries  Outcome: Progressing  Goal: Promote/optimize nutrition  Outcome: Progressing  Goal: Promote skin healing  Outcome: Progressing

## 2023-11-19 ENCOUNTER — APPOINTMENT (OUTPATIENT)
Dept: RADIOLOGY | Facility: HOSPITAL | Age: 78
DRG: 181 | End: 2023-11-19
Payer: MEDICARE

## 2023-11-19 LAB
ALBUMIN SERPL BCP-MCNC: 2.9 G/DL (ref 3.4–5)
ANION GAP SERPL CALC-SCNC: 14 MMOL/L (ref 10–20)
BASOPHILS # BLD AUTO: 0.06 X10*3/UL (ref 0–0.1)
BASOPHILS NFR BLD AUTO: 0.6 %
BUN SERPL-MCNC: 22 MG/DL (ref 6–23)
BURR CELLS BLD QL SMEAR: NORMAL
CALCIUM SERPL-MCNC: 9.7 MG/DL (ref 8.6–10.6)
CHLORIDE SERPL-SCNC: 105 MMOL/L (ref 98–107)
CO2 SERPL-SCNC: 22 MMOL/L (ref 21–32)
CREAT SERPL-MCNC: 2.01 MG/DL (ref 0.5–1.3)
EOSINOPHIL # BLD AUTO: 0.09 X10*3/UL (ref 0–0.4)
EOSINOPHIL NFR BLD AUTO: 0.9 %
ERYTHROCYTE [DISTWIDTH] IN BLOOD BY AUTOMATED COUNT: 28.8 % (ref 11.5–14.5)
GFR SERPL CREATININE-BSD FRML MDRD: 34 ML/MIN/1.73M*2
GLUCOSE SERPL-MCNC: 102 MG/DL (ref 74–99)
HCT VFR BLD AUTO: 31.9 % (ref 41–52)
HGB BLD-MCNC: 9 G/DL (ref 13.5–17.5)
HYPOCHROMIA BLD QL SMEAR: NORMAL
IMM GRANULOCYTES # BLD AUTO: 0.06 X10*3/UL (ref 0–0.5)
IMM GRANULOCYTES NFR BLD AUTO: 0.6 % (ref 0–0.9)
LYMPHOCYTES # BLD AUTO: 0.82 X10*3/UL (ref 0.8–3)
LYMPHOCYTES NFR BLD AUTO: 7.8 %
MAGNESIUM SERPL-MCNC: 2.08 MG/DL (ref 1.6–2.4)
MCH RBC QN AUTO: 18.5 PG (ref 26–34)
MCHC RBC AUTO-ENTMCNC: 28.2 G/DL (ref 32–36)
MCV RBC AUTO: 66 FL (ref 80–100)
MONOCYTES # BLD AUTO: 0.51 X10*3/UL (ref 0.05–0.8)
MONOCYTES NFR BLD AUTO: 4.9 %
NEUTROPHILS # BLD AUTO: 8.93 X10*3/UL (ref 1.6–5.5)
NEUTROPHILS NFR BLD AUTO: 85.2 %
NRBC BLD-RTO: 0.2 /100 WBCS (ref 0–0)
PHOSPHATE SERPL-MCNC: 2.2 MG/DL (ref 2.5–4.9)
PLATELET # BLD AUTO: 677 X10*3/UL (ref 150–450)
POLYCHROMASIA BLD QL SMEAR: NORMAL
POTASSIUM SERPL-SCNC: 4.2 MMOL/L (ref 3.5–5.3)
RBC # BLD AUTO: 4.86 X10*6/UL (ref 4.5–5.9)
RBC MORPH BLD: NORMAL
SODIUM SERPL-SCNC: 137 MMOL/L (ref 136–145)
TARGETS BLD QL SMEAR: NORMAL
WBC # BLD AUTO: 10.5 X10*3/UL (ref 4.4–11.3)

## 2023-11-19 PROCEDURE — 36415 COLL VENOUS BLD VENIPUNCTURE: CPT

## 2023-11-19 PROCEDURE — 80069 RENAL FUNCTION PANEL: CPT

## 2023-11-19 PROCEDURE — 94640 AIRWAY INHALATION TREATMENT: CPT

## 2023-11-19 PROCEDURE — 2500000004 HC RX 250 GENERAL PHARMACY W/ HCPCS (ALT 636 FOR OP/ED): Performed by: STUDENT IN AN ORGANIZED HEALTH CARE EDUCATION/TRAINING PROGRAM

## 2023-11-19 PROCEDURE — 2500000002 HC RX 250 W HCPCS SELF ADMINISTERED DRUGS (ALT 637 FOR MEDICARE OP, ALT 636 FOR OP/ED)

## 2023-11-19 PROCEDURE — 2500000004 HC RX 250 GENERAL PHARMACY W/ HCPCS (ALT 636 FOR OP/ED)

## 2023-11-19 PROCEDURE — 70553 MRI BRAIN STEM W/O & W/DYE: CPT

## 2023-11-19 PROCEDURE — 99232 SBSQ HOSP IP/OBS MODERATE 35: CPT | Performed by: STUDENT IN AN ORGANIZED HEALTH CARE EDUCATION/TRAINING PROGRAM

## 2023-11-19 PROCEDURE — 1200000002 HC GENERAL ROOM WITH TELEMETRY DAILY

## 2023-11-19 PROCEDURE — 96372 THER/PROPH/DIAG INJ SC/IM: CPT

## 2023-11-19 PROCEDURE — C9113 INJ PANTOPRAZOLE SODIUM, VIA: HCPCS

## 2023-11-19 PROCEDURE — 83735 ASSAY OF MAGNESIUM: CPT

## 2023-11-19 PROCEDURE — 93005 ELECTROCARDIOGRAM TRACING: CPT

## 2023-11-19 PROCEDURE — 85025 COMPLETE CBC W/AUTO DIFF WBC: CPT

## 2023-11-19 PROCEDURE — 93010 ELECTROCARDIOGRAM REPORT: CPT | Performed by: INTERNAL MEDICINE

## 2023-11-19 RX ADMIN — PANTOPRAZOLE SODIUM 40 MG: 40 INJECTION, POWDER, FOR SOLUTION INTRAVENOUS at 08:56

## 2023-11-19 RX ADMIN — FORMOTEROL FUMARATE DIHYDRATE 20 MCG: 20 SOLUTION RESPIRATORY (INHALATION) at 10:21

## 2023-11-19 RX ADMIN — SODIUM CHLORIDE 500 ML: 9 INJECTION, SOLUTION INTRAVENOUS at 17:05

## 2023-11-19 RX ADMIN — PANTOPRAZOLE SODIUM 40 MG: 40 INJECTION, POWDER, FOR SOLUTION INTRAVENOUS at 20:49

## 2023-11-19 ASSESSMENT — COGNITIVE AND FUNCTIONAL STATUS - GENERAL
DAILY ACTIVITIY SCORE: 22
DRESSING REGULAR LOWER BODY CLOTHING: A LITTLE
DRESSING REGULAR LOWER BODY CLOTHING: A LITTLE
DAILY ACTIVITIY SCORE: 22
MOBILITY SCORE: 22
WALKING IN HOSPITAL ROOM: A LITTLE
CLIMB 3 TO 5 STEPS WITH RAILING: A LITTLE
MOBILITY SCORE: 22
CLIMB 3 TO 5 STEPS WITH RAILING: A LITTLE
HELP NEEDED FOR BATHING: A LITTLE
WALKING IN HOSPITAL ROOM: A LITTLE
PERSONAL GROOMING: A LITTLE

## 2023-11-19 ASSESSMENT — ENCOUNTER SYMPTOMS
FATIGUE: 1
SHORTNESS OF BREATH: 1
CONSTIPATION: 1
NAUSEA: 1
WEAKNESS: 1

## 2023-11-19 ASSESSMENT — PAIN SCALES - GENERAL
PAINLEVEL_OUTOF10: 0 - NO PAIN

## 2023-11-19 ASSESSMENT — PAIN - FUNCTIONAL ASSESSMENT
PAIN_FUNCTIONAL_ASSESSMENT: 0-10

## 2023-11-19 NOTE — CARE PLAN
The patient's goals for the shift include      The clinical goals for the shift include Pt being able to eat solid food by end of shift.      Problem: Safety - Adult  Goal: Free from fall injury  Outcome: Progressing  Flowsheets (Taken 11/18/2023 1732)  Free from fall injury: Instruct family/caregiver on patient safety

## 2023-11-19 NOTE — CONSULTS
Inpatient consult to Radiation Oncology  Consult performed by: Yvette Kelley MD  Consult ordered by: Mimi Moreno MD  Reason for consult: lung mass  Assessment/Recommendations: Will provide updated recs after staging is complete  Will need to determine if patient has capacity to consent to radiation        Reason For Consult  Lung mass    History Of Present Illness  Noel Aquino is a 78 y.o. 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 He is not on treatment and has not yet established care with an oncologist.    The patient presented this admission with complaints of chronic hematemesis and hemoptysis, found to have Hgb 5.9 on presentation now >9 s/p 2 units pRBC.  CTA chest revealed 13.8cm left lung upper lobe mass with mediastinal shift (previously 9cm in 7/2023). A filling defect of the left pulmonary vein is present. This was reviewed by vascular medicine who determined that this is from tumor mass effect. The patient does not endorse recent hemoptysis during evaluation.    The patient is cooperative during evaluation, but the extent of his insight about his cancer diagnosis and the risks and benefits of radiation is uncertain.     Past Medical History  He has no past medical history on file.  CKD3 per chart review    Surgical History  He has a past surgical history that includes CT angio abdomen pelvis w and or wo IV IV contrast (11/17/2023).     Social History  Tobacco and cocaine use per chart review, resides with cousins and nephew per chart review    Family History  No family history on file.     Allergies  Patient has no known allergies.    Review of Systems   Constitutional:  Positive for fatigue.        ROS limited by patient participation   Respiratory:  Positive for shortness of breath.    Cardiovascular:  Negative for chest pain.   Gastrointestinal:  Positive for constipation and  "nausea.   Neurological:  Positive for weakness.        Physical Exam  Constitutional:       Comments: Appears gaunt   HENT:      Mouth/Throat:      Mouth: Mucous membranes are moist.   Eyes:      Extraocular Movements: Extraocular movements intact.      Pupils: Pupils are equal, round, and reactive to light.   Cardiovascular:      Rate and Rhythm: Normal rate and regular rhythm.      Heart sounds: Normal heart sounds.   Pulmonary:      Effort: No respiratory distress.      Breath sounds: Rhonchi present.      Comments: Rhonchi appreciated in right lung fields, good aeration throughout right lung fields.  Decreased air movement in left lung fields with near absence of audible air movement in left lower lobe.  Abdominal:      General: There is distension.   Skin:     General: Skin is warm and dry.   Neurological:      General: No focal deficit present.        Last Recorded Vitals  Blood pressure 138/77, pulse 107, temperature 39 °C (102.2 °F), resp. rate 18, height 1.753 m (5' 9\"), weight 70.3 kg (155 lb), SpO2 95 %.    Relevant Results  From CTA 11/17: There is compression and significant narrowing of the left main pulmonary artery and left interlobar artery secondary to mass effect by large left upper lobe soft tissue mass (series 401, image 116), with non opacification of the segmental arteries of the left lower lobe.   There is a rounded hypodense filling defect within the left  superior pulmonary vein (series 401, image 143) which is contiguous left upper lobe soft tissue mass, consistent with tumor thrombus. Additionally there is significant narrowing and obliteration of the left superior pulmonary vein (series 401, image 149).  There is mild rightward mediastinal shift secondary to large left  upper lobe lung mass.  No evidence of thoracic lymphadenopathy by CT criteria.  There is significant stenosis of the proximal left lower lobe  bronchus (series 404, image 121), secondary to compression by large left upper " lobe lung mass now involving the near entirety of the left upper lobe and measuring approximately 13.8 x 9.7 x 13.4 cm, with multifocal areas of hypoattenuation, which may represent necrosis. There is extension of the mass into the left lower lobe (series 404, image 182). There are areas of atelectasis surrounding the mass. There is a small left-sided pleural effusion. The right lung is clear.   Multiple peripheral wedge-shaped areas of hypoattenuation throughout the bilateral kidneys, concerning for renal infarcts. Can not exclude a component of metastatic tumor involvement.   Diffuse body wall edema, similar to prior exam..  No acute osseous pathology.There are no suspicious osseous lesions.       Assessment/Plan   Mr. Aquino is a 78 y.o. 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     A complete staging workup will be needed to determine this patient's course of oncologic treatment, including the decision of palliative vs definitive radiation. If disease is locoregional, chemotherapy may be indicated and would require coordination between radiation and medical oncology. We recommend a formal medical oncology consult, MRI of the brain and whole body PET-CT or bone scan.     The patient is cooperative during evaluation, but the extent of his insight about his cancer diagnosis and the risks and benefits of radiation is uncertain. We would recommend locating NOK or ethics discussion to determine capacity for medical decision-making.    The patient was assessed and plan discussed with the attending radiation oncologist Dr. Arce.    Yvette Kelley MD  PGY-2 Radiation Oncology Resident  On-call pager [phone]  Available on Epic Secure Chat      I saw and evaluated the patient. I  personally obtained the key and critical portions of the history and physical exam or was physically present for key and critical portions performed by the resident/fellow. I reviewed the resident/fellow's documentation and discussed the patient with the resident/fellow. I agree with the resident/fellow's medical decision making as documented in the note.      Mr. Aquino is a 78-year-old with a diagnosis of at least locally advanced squamous cell carcinoma of the left upper lobe PD-L1 TPS 2% that presents with significant anemia from unknown source.  The patient has status post 2 units of packed red blood cells and hemoglobin is trending but responding to the initial 2 units.  The patient seems stable from a cardiac and pulmonary standpoint currently to undergo completion staging with MRI of the brain and at minimum nuclear medicine bone scan versus PET/CT.  If staging imaging is negative for distant metastatic disease, the patient may be curable with definitive intent radiation therapy in the setting of concurrent chemoradiation, sequential radiation therapy followed by chemotherapy or definitive intent radiation alone if not a chemotherapy candidate.  If the patient is found to have metastatic disease we will plan for palliative course of radiation therapy to the left upper lobe lesion under the pretenses this could be the source of bleeding; however, the patient has a history of duodenal AVM therefore continued monitoring of blood counts will be needed to ensure bleeding is not from another source.    I discussed with the patient his clinical presentation, needed work-up, indications for radiation therapy in both scenarios including benefits, risks and alternatives.  During the conversation the patient could not respond back understanding of the plan, therefore would recommend support from our social work team and possibly ethics to aid in development of durable healthcare power of  or guardian to  assist in making medical decisions.  I will reach out to inpatient medical oncology team for assistance in evaluating the patient may be a candidate for either concurrent chemoradiation or sequential chemotherapy and radiation in the setting of negative distant metastatic disease work-up for definitive intent treatment.    Plan:  -Radiation plan findings of brain MRI and bone scan PET/CT  -If locally advanced would consider definitive intent treatment concurrent chemoradiation, sequential radiation followed by chemotherapy or definitive radiation alone with recommendation assistance from medical oncology  -If found to be metastatic radiation therapy to the left upper lobe mass and patient will be needing medical oncology follow-up as an outpatient for systemic therapy  -Social work assistance for development of alternative durable healthcare power of  for the patient versus guardianship and ethics consult    A total of 35 minutes were spent face-to-face with the patient, the majority of time spent detailing treatment options with an additional 25 minutes spent reviewing records including imaging, pathology and physician notes.    Ramin Arce MD  [facility]  [facility]  - Department of Radiation Oncology  Phone: [phone]  Fax: [phone]  [employer] secure chat preferred / Pager [phone]    Note: This was transcribed using Dragon voice recognition software. Attempts were made to correct any errors; however, errors or omissions may be present.

## 2023-11-19 NOTE — CARE PLAN
Problem: Skin  Goal: Prevent/manage excess moisture  Outcome: Progressing  Goal: Prevent/minimize sheer/friction injuries  Flowsheets (Taken 11/19/2023 1019)  Prevent/minimize sheer/friction injuries:   HOB 30 degrees or less   Complete micro-shifts as needed if patient unable. Adjust patient position to relieve pressure points, not a full turn

## 2023-11-19 NOTE — PROGRESS NOTES
Noel Aquino is a 78 y.o. male on day 2 of admission presenting with Anemia.    Subjective   NAEON. Patient again denies hematemesis/hemoptysis. Denies fever, chills, nausea, vomiting. States his abdominal pain is chronic.        Objective   Weight: 70.3 kg (155 lb) (11/17/23 0333)    Daily Weight  11/17/23 : 70.3 kg (155 lb)    Last Recorded Vitals  Heart Rate:  []   Temp:  [36.3 °C (97.3 °F)-39 °C (102.2 °F)]   Resp:  [14-18]   BP: (120-143)/()   SpO2:  [95 %-99 %]      Physical Exam  Constitutional:       General: He is not in acute distress.     Appearance: Normal appearance. He is not ill-appearing.   HENT:      Head: Normocephalic and atraumatic.   Eyes:      Extraocular Movements: Extraocular movements intact.      Pupils: Pupils are equal, round, and reactive to light.   Cardiovascular:      Rate and Rhythm: Tachycardia present. Rhythm irregular.      Pulses: Normal pulses.      Heart sounds: Normal heart sounds.   Pulmonary:      Effort: Pulmonary effort is normal. No respiratory distress.      Breath sounds: Normal breath sounds.   Abdominal:      General: Abdomen is flat.      Palpations: Abdomen is soft.      Tenderness: There is abdominal tenderness.      Comments: Diffusely tender but at baseline per patient   Musculoskeletal:         General: No swelling or deformity.   Skin:     General: Skin is warm and dry.   Neurological:      Mental Status: He is alert.       Intake/Output last 3 Shifts:  I/O last 3 completed shifts:  In: 1240 (17.6 mL/kg) [P.O.:240; IV Piggyback:1000]  Out: 1200 (17.1 mL/kg) [Urine:1200 (0.5 mL/kg/hr)]  Weight: 70.3 kg     Medications  Scheduled medications  cefTRIAXone, 1 g, intravenous, q24h  tiotropium, 2 Inhalation, inhalation, Daily   And  formoterol, 20 mcg, nebulization, q12h  heparin (porcine), 5,000 Units, subcutaneous, q8h  pantoprazole, 40 mg, intravenous, BID  polyethylene glycol, 17 g, oral, Daily      Continuous medications     PRN medications  PRN  medications: acetaminophen, HYDROmorphone, ipratropium-albuteroL, ondansetron, polyethylene glycol       Relevant Results    Labs  Lab Results   Component Value Date    WBC 10.5 11/19/2023    HGB 9.0 (L) 11/19/2023    HCT 31.9 (L) 11/19/2023    MCV 66 (L) 11/19/2023     (H) 11/19/2023      Results from last 7 days   Lab Units 11/19/23  0636 11/18/23  1408 11/17/23  0406   SODIUM mmol/L 137 139 135*   POTASSIUM mmol/L 4.2 4.6 5.2   CHLORIDE mmol/L 105 108* 104   CO2 mmol/L 22 22 23   BUN mg/dL 22 25* 29*   CREATININE mg/dL 2.01* 1.96* 2.23*   CALCIUM mg/dL 9.7 9.5 9.6        Results from last 7 days   Lab Units 11/17/23  0406   ALK PHOS U/L 35   BILIRUBIN TOTAL mg/dL 0.4   PROTEIN TOTAL g/dL 7.1   ALT U/L 4*   AST U/L 20               Imaging  No new imaging     Assessment/Plan   Principal Problem:    Anemia    77 y/o M with PMHx of left upper lobe mass, 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 He was HDS though vitals were significant for tachycardia to 132 and labs showed Hb 5.9 on presentation which improved/incremented more than appropriately to 9 after 2u pRBC. He was also started on ceftriaxone. He has elevated Cr to 2.23 from his BL ~2, with BUN elevated to 29. CT angio AP was limited due to diffuse edema and abdominopelvic fluid but showed filling defect of the left pulmonary vein concerning for luminal thrombus though showed no active extravasation within GI tract. CT angio Chest showed interval increase in size of MESHA mass with mass effect on left main pulmonary artery, left interlobar artery, obliteration of left superior pulmonary vein, rounded hypodense filling defect consisted with tumor thrombus. Pulmonology and GI were consulted, with no place for acute intervention. Oncology was consulted and did not feel  there was need for inpatient evaluation. Radiation Oncology was consulted and recommend MRI brain and bone scan for staging.     11/19 Updates   - obtain EKG to clarify tachycardia to consider fluid resuscitation  - Patient lacks insight. Will likely need SW to come on board for capacity eval +/- ethics consultation     #Suspected Hemoptysis vs. 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   - Gastroenterology consulted: IV PPI BIDx14 -> PO PPI BIDx14 -> PO PPD daily, not good candidate for sedation/EGD iso below  - Pulmonology consulted: unlikely hemoptysis, nebulized TXA  as temporizing measure for hemoptysis  - daily CBC  - goal Hgb>7 (has been stable since initial transfusions)     #MESHA mass, SCC, with mass effect  #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 Martinez  - Vascular medicine consulted, tumor thrombus is intraluminal extension of tumor, continue H  - RadOnc consulted, MRI brain and bone scan for staging ordered - pending further reccs      #Sinus tachycardia   ::Suspect hypovolemia as underlying etiology  - EKG showing sinus tachycardia with  respiratory variation c/w sinus arrhythmia  - will give 500 ml NS bolus and re-assess volume status as needed    #Abdominal Pain, unclear etiology   #Constipation   ::CT angio AP: diffuse edema and abdominopelvic fluid, moderate stool burdn  - received 2 days of ceftriaxone - will hold and monitor  - Tolerating regular diet   - Bowel Regimen     #Pyuria   ::UA 11/17 appeared dirty, Ucx NGTD  - c/w Ceftriaxone as above     #COPD  - Continue Stiolto  - Duonebs Q4H prn     #HTN  - Holding home amlodipine 5 iso hypotension on admission     #BPH   - Holding home tamsulosin iso hypotension on admission     F : as needed  E: replete lytes prn,  N: Full Diet     DVT prophylaxis: heparin sq  GI prophylaxis: IV PPI BID as per GI recs     Code Status: Full (discussed with patient at bedside upon admission)

## 2023-11-19 NOTE — CARE PLAN
The patient's goals for the shift include      The clinical goals for the shift include Will cooperate with care throughout shift.

## 2023-11-19 NOTE — CARE PLAN
Problem: Skin  Goal: Prevent/manage excess moisture  11/19/2023 1051 by Laura Banks RN  Flowsheets (Taken 11/19/2023 1051)  Prevent/manage excess moisture: Moisturize dry skin  11/19/2023 1019 by Laura Banks RN  Outcome: Progressing  Goal: Prevent/minimize sheer/friction injuries  Flowsheets (Taken 11/19/2023 1019)  Prevent/minimize sheer/friction injuries:   HOB 30 degrees or less   Complete micro-shifts as needed if patient unable. Adjust patient position to relieve pressure points, not a full turn   The patient's goals for the shift include      The clinical goals for the shift include patient will remain free of falls and injury

## 2023-11-19 NOTE — PROGRESS NOTES
Gastroenterology Consult Service Progress Note  Department of Gastroenterology & Hepatology  [facility]    [facility]  Date of Service  November 19, 2023   Patient: Noel Aquino    Medical Record: [id number]      Interval History: Patient with no further hematemesis. Stable H.H      Vital Signs:    Vitals:    11/19/23 0030 11/19/23 0510 11/19/23 0746 11/19/23 1152   BP: 130/75 140/78 130/76 121/63   Pulse: 94 (!) 132 (!) 131 103   Resp:  18 14 18   Temp:  37.1 °C (98.8 °F) 36.3 °C (97.3 °F) 36.5 °C (97.7 °F)   TempSrc:    Temporal   SpO2: 99% 99% 98% 98%   Weight:       Height:             Intake/Output Summary (Last 24 hours) at 11/19/2023 1254  Last data filed at 11/19/2023 0622  Gross per 24 hour   Intake 1240 ml   Output 1200 ml   Net 40 ml       Alert and oriented x 3  CTAB  RRR  Abd soft, NT, ND  Skin warm and dry    Assessment:     Noel Aquino is a 78 y.o. male with  history of left upper lobe mass (SCC on bx in 8/2023), COPD, CKD, duodenal AVM, GI bleed, polysubstance use disorder (ETOH, tobacco, cocaine), JOSÉ MIGUEL, HTN, BPH, presented by EMS to the emergency department for hematemesis. Gastroenterology is consulted for  hematemesis..     The patient is a poor historian with self reported Red colored vomiting, Hb on presentation is 5.9 ( 1 g drop from his hb on 08/17 7.1 and previously hb baseline of 7) incrementing well after 2 units prbc to 9, the patient is hemodynamically stable. With no other episodes in hospital, Unclear source of bleeding can be Hematemesis, or hemoptysis. Or can be related to ingestion of red colored food or drinks. The patient has a previous history of hematemesis in 2022 was presumed due to single non-bleeding angiodysplastic lesion in  the duodenum s/p APC. Ddx for UGI and hematemesis can include AVM, PUD, gastritis or malignant lesions. The patient is not known to have any liver cirrhosis or varices visualized on previous EGD. No  signs or symptoms of liver disease.      Updates 11/19/23  - Stable H/H with Hb at 9.1     # Hematemesis  # UGIB  # JOSÉ MIGUEL     Recommendations  - Given extensive malignant involvement of almost entire left chest, patient is high risk for life threatening complication if he were to receive sedation, Hence, we think close monitoring with supportive therapy would be appropriate  - Transition to PO PPI BID x14 and then PO PPI daily  - Trend CBC daily  - Monitor vitals  - If the patient has any hematemesis with hemodynamic instability contact GI fellow on call.   - On clear liquid diet; okay to advance as tolerated from GI standpoint.      Patient discussed with attending, Dr. Daniel.      Thank you for the consultation. Gastroenterology will SIGN OFF.     Please do not hesitate to contact me or page [phone] if there are any further questions between the weekday hours of 7 AM - 5 PM.   If there is an urgent concern during the weekend, after-hours, or holidays; then please page the [place] [phone]. Thank you.    SIGNATURE: Jaswinder Souza MD PATIENT NAME: Noel Aquino   DATE: November 19, 2023 MRN: [id number]

## 2023-11-20 ENCOUNTER — APPOINTMENT (OUTPATIENT)
Dept: CARDIOLOGY | Facility: HOSPITAL | Age: 78
DRG: 181 | End: 2023-11-20
Payer: MEDICARE

## 2023-11-20 ENCOUNTER — APPOINTMENT (OUTPATIENT)
Dept: RADIOLOGY | Facility: HOSPITAL | Age: 78
DRG: 181 | End: 2023-11-20
Payer: MEDICARE

## 2023-11-20 LAB
ALBUMIN SERPL BCP-MCNC: 2.7 G/DL (ref 3.4–5)
ANION GAP SERPL CALC-SCNC: 13 MMOL/L (ref 10–20)
ATRIAL RATE: 101 BPM
ATRIAL RATE: 108 BPM
ATRIAL RATE: 110 BPM
ATRIAL RATE: 99 BPM
BASOPHILS # BLD AUTO: 0.1 X10*3/UL (ref 0–0.1)
BASOPHILS NFR BLD AUTO: 0.9 %
BUN SERPL-MCNC: 20 MG/DL (ref 6–23)
BURR CELLS BLD QL SMEAR: NORMAL
CALCIUM SERPL-MCNC: 9.6 MG/DL (ref 8.6–10.6)
CHLORIDE SERPL-SCNC: 105 MMOL/L (ref 98–107)
CO2 SERPL-SCNC: 26 MMOL/L (ref 21–32)
CREAT SERPL-MCNC: 1.76 MG/DL (ref 0.5–1.3)
EOSINOPHIL # BLD AUTO: 0.13 X10*3/UL (ref 0–0.4)
EOSINOPHIL NFR BLD AUTO: 1.2 %
ERYTHROCYTE [DISTWIDTH] IN BLOOD BY AUTOMATED COUNT: 29.5 % (ref 11.5–14.5)
GFR SERPL CREATININE-BSD FRML MDRD: 40 ML/MIN/1.73M*2
GLUCOSE SERPL-MCNC: 80 MG/DL (ref 74–99)
HCT VFR BLD AUTO: 31.1 % (ref 41–52)
HGB BLD-MCNC: 8.6 G/DL (ref 13.5–17.5)
HYPOCHROMIA BLD QL SMEAR: NORMAL
IMM GRANULOCYTES # BLD AUTO: 0.05 X10*3/UL (ref 0–0.5)
IMM GRANULOCYTES NFR BLD AUTO: 0.4 % (ref 0–0.9)
LYMPHOCYTES # BLD AUTO: 1.28 X10*3/UL (ref 0.8–3)
LYMPHOCYTES NFR BLD AUTO: 11.5 %
MAGNESIUM SERPL-MCNC: 2.02 MG/DL (ref 1.6–2.4)
MCH RBC QN AUTO: 18.5 PG (ref 26–34)
MCHC RBC AUTO-ENTMCNC: 27.7 G/DL (ref 32–36)
MCV RBC AUTO: 67 FL (ref 80–100)
MONOCYTES # BLD AUTO: 0.63 X10*3/UL (ref 0.05–0.8)
MONOCYTES NFR BLD AUTO: 5.6 %
NEUTROPHILS # BLD AUTO: 8.98 X10*3/UL (ref 1.6–5.5)
NEUTROPHILS NFR BLD AUTO: 80.4 %
NRBC BLD-RTO: 0 /100 WBCS (ref 0–0)
P AXIS: 60 DEGREES
P AXIS: 63 DEGREES
P AXIS: 67 DEGREES
P AXIS: 68 DEGREES
P OFFSET: 180 MS
P OFFSET: 182 MS
P OFFSET: 183 MS
P OFFSET: 186 MS
P ONSET: 126 MS
P ONSET: 134 MS
P ONSET: 137 MS
P ONSET: 140 MS
PHOSPHATE SERPL-MCNC: 2.8 MG/DL (ref 2.5–4.9)
PLATELET # BLD AUTO: 672 X10*3/UL (ref 150–450)
POLYCHROMASIA BLD QL SMEAR: NORMAL
POTASSIUM SERPL-SCNC: 4.5 MMOL/L (ref 3.5–5.3)
PR INTERVAL: 164 MS
PR INTERVAL: 168 MS
PR INTERVAL: 176 MS
PR INTERVAL: 192 MS
Q ONSET: 219 MS
Q ONSET: 222 MS
Q ONSET: 222 MS
Q ONSET: 224 MS
QRS COUNT: 17 BEATS
QRS COUNT: 17 BEATS
QRS COUNT: 18 BEATS
QRS COUNT: 18 BEATS
QRS DURATION: 70 MS
QRS DURATION: 70 MS
QRS DURATION: 72 MS
QRS DURATION: 76 MS
QT INTERVAL: 300 MS
QT INTERVAL: 318 MS
QT INTERVAL: 320 MS
QT INTERVAL: 326 MS
QTC CALCULATION(BAZETT): 389 MS
QTC CALCULATION(BAZETT): 408 MS
QTC CALCULATION(BAZETT): 433 MS
QTC CALCULATION(BAZETT): 436 MS
QTC FREDERICIA: 357 MS
QTC FREDERICIA: 375 MS
QTC FREDERICIA: 392 MS
QTC FREDERICIA: 396 MS
R AXIS: 20 DEGREES
R AXIS: 22 DEGREES
R AXIS: 22 DEGREES
R AXIS: 4 DEGREES
RBC # BLD AUTO: 4.66 X10*6/UL (ref 4.5–5.9)
RBC MORPH BLD: NORMAL
SODIUM SERPL-SCNC: 139 MMOL/L (ref 136–145)
T AXIS: 75 DEGREES
T AXIS: 78 DEGREES
T AXIS: 82 DEGREES
T AXIS: 87 DEGREES
T OFFSET: 372 MS
T OFFSET: 382 MS
T OFFSET: 382 MS
T OFFSET: 383 MS
TARGETS BLD QL SMEAR: NORMAL
VENTRICULAR RATE: 101 BPM
VENTRICULAR RATE: 108 BPM
VENTRICULAR RATE: 110 BPM
VENTRICULAR RATE: 99 BPM
WBC # BLD AUTO: 11.2 X10*3/UL (ref 4.4–11.3)

## 2023-11-20 PROCEDURE — 83735 ASSAY OF MAGNESIUM: CPT

## 2023-11-20 PROCEDURE — 93010 ELECTROCARDIOGRAM REPORT: CPT | Performed by: INTERNAL MEDICINE

## 2023-11-20 PROCEDURE — 2500000004 HC RX 250 GENERAL PHARMACY W/ HCPCS (ALT 636 FOR OP/ED)

## 2023-11-20 PROCEDURE — 85025 COMPLETE CBC W/AUTO DIFF WBC: CPT

## 2023-11-20 PROCEDURE — 2500000001 HC RX 250 WO HCPCS SELF ADMINISTERED DRUGS (ALT 637 FOR MEDICARE OP)

## 2023-11-20 PROCEDURE — A9503 TC99M MEDRONATE: HCPCS | Performed by: INTERNAL MEDICINE

## 2023-11-20 PROCEDURE — 93005 ELECTROCARDIOGRAM TRACING: CPT

## 2023-11-20 PROCEDURE — 70553 MRI BRAIN STEM W/O & W/DYE: CPT | Performed by: STUDENT IN AN ORGANIZED HEALTH CARE EDUCATION/TRAINING PROGRAM

## 2023-11-20 PROCEDURE — A9575 INJ GADOTERATE MEGLUMI 0.1ML: HCPCS | Performed by: STUDENT IN AN ORGANIZED HEALTH CARE EDUCATION/TRAINING PROGRAM

## 2023-11-20 PROCEDURE — 96372 THER/PROPH/DIAG INJ SC/IM: CPT

## 2023-11-20 PROCEDURE — 1200000002 HC GENERAL ROOM WITH TELEMETRY DAILY

## 2023-11-20 PROCEDURE — 78306 BONE IMAGING WHOLE BODY: CPT

## 2023-11-20 PROCEDURE — 2500000002 HC RX 250 W HCPCS SELF ADMINISTERED DRUGS (ALT 637 FOR MEDICARE OP, ALT 636 FOR OP/ED)

## 2023-11-20 PROCEDURE — 99233 SBSQ HOSP IP/OBS HIGH 50: CPT | Performed by: INTERNAL MEDICINE

## 2023-11-20 PROCEDURE — 2550000001 HC RX 255 CONTRASTS: Performed by: STUDENT IN AN ORGANIZED HEALTH CARE EDUCATION/TRAINING PROGRAM

## 2023-11-20 PROCEDURE — 3430000001 HC RX 343 DIAGNOSTIC RADIOPHARMACEUTICALS: Performed by: INTERNAL MEDICINE

## 2023-11-20 PROCEDURE — 94640 AIRWAY INHALATION TREATMENT: CPT

## 2023-11-20 PROCEDURE — 36415 COLL VENOUS BLD VENIPUNCTURE: CPT

## 2023-11-20 PROCEDURE — 80069 RENAL FUNCTION PANEL: CPT

## 2023-11-20 PROCEDURE — 78306 BONE IMAGING WHOLE BODY: CPT | Performed by: STUDENT IN AN ORGANIZED HEALTH CARE EDUCATION/TRAINING PROGRAM

## 2023-11-20 RX ORDER — LORAZEPAM 2 MG/ML
0.5 INJECTION INTRAMUSCULAR ONCE
Status: DISCONTINUED | OUTPATIENT
Start: 2023-11-20 | End: 2023-11-21

## 2023-11-20 RX ORDER — GADOTERATE MEGLUMINE 376.9 MG/ML
13 INJECTION INTRAVENOUS
Status: COMPLETED | OUTPATIENT
Start: 2023-11-20 | End: 2023-11-20

## 2023-11-20 RX ORDER — LORAZEPAM 1 MG/1
0.5 TABLET ORAL ONCE
Status: DISCONTINUED | OUTPATIENT
Start: 2023-11-20 | End: 2023-11-20

## 2023-11-20 RX ORDER — SENNOSIDES 8.6 MG/1
1 TABLET ORAL 2 TIMES DAILY
Status: DISCONTINUED | OUTPATIENT
Start: 2023-11-20 | End: 2023-11-29 | Stop reason: HOSPADM

## 2023-11-20 RX ORDER — PANTOPRAZOLE SODIUM 40 MG/1
40 TABLET, DELAYED RELEASE ORAL
Status: DISCONTINUED | OUTPATIENT
Start: 2023-11-20 | End: 2023-11-29 | Stop reason: HOSPADM

## 2023-11-20 RX ORDER — PANTOPRAZOLE SODIUM 40 MG/1
40 TABLET, DELAYED RELEASE ORAL
Status: DISCONTINUED | OUTPATIENT
Start: 2023-11-20 | End: 2023-11-20

## 2023-11-20 RX ORDER — LORAZEPAM 2 MG/ML
0.5 INJECTION INTRAMUSCULAR ONCE
Status: DISCONTINUED | OUTPATIENT
Start: 2023-11-20 | End: 2023-11-20

## 2023-11-20 RX ADMIN — HEPARIN SODIUM 5000 UNITS: 5000 INJECTION INTRAVENOUS; SUBCUTANEOUS at 17:39

## 2023-11-20 RX ADMIN — FORMOTEROL FUMARATE DIHYDRATE 20 MCG: 20 SOLUTION RESPIRATORY (INHALATION) at 21:08

## 2023-11-20 RX ADMIN — TECHNETIUM TC 99M MEDRONATE 26.2 MILLICURIE: 25 INJECTION, POWDER, FOR SOLUTION INTRAVENOUS at 07:45

## 2023-11-20 RX ADMIN — SENNOSIDES 8.6 MG: 8.6 TABLET, FILM COATED ORAL at 20:34

## 2023-11-20 RX ADMIN — HEPARIN SODIUM 5000 UNITS: 5000 INJECTION INTRAVENOUS; SUBCUTANEOUS at 08:44

## 2023-11-20 RX ADMIN — POLYETHYLENE GLYCOL 3350 17 G: 17 POWDER, FOR SOLUTION ORAL at 08:44

## 2023-11-20 RX ADMIN — GADOTERATE MEGLUMINE 13 ML: 376.9 INJECTION INTRAVENOUS at 00:18

## 2023-11-20 RX ADMIN — SENNOSIDES 8.6 MG: 8.6 TABLET, FILM COATED ORAL at 15:22

## 2023-11-20 RX ADMIN — PANTOPRAZOLE SODIUM 40 MG: 40 TABLET, DELAYED RELEASE ORAL at 15:22

## 2023-11-20 ASSESSMENT — COGNITIVE AND FUNCTIONAL STATUS - GENERAL
MOVING TO AND FROM BED TO CHAIR: A LITTLE
WALKING IN HOSPITAL ROOM: A LOT
DRESSING REGULAR UPPER BODY CLOTHING: A LITTLE
DAILY ACTIVITIY SCORE: 19
MOVING FROM LYING ON BACK TO SITTING ON SIDE OF FLAT BED WITH BEDRAILS: A LITTLE
DRESSING REGULAR LOWER BODY CLOTHING: A LITTLE
MOBILITY SCORE: 16
CLIMB 3 TO 5 STEPS WITH RAILING: A LOT
TOILETING: A LITTLE
PERSONAL GROOMING: A LITTLE
HELP NEEDED FOR BATHING: A LITTLE
STANDING UP FROM CHAIR USING ARMS: A LITTLE
TURNING FROM BACK TO SIDE WHILE IN FLAT BAD: A LITTLE

## 2023-11-20 ASSESSMENT — PAIN - FUNCTIONAL ASSESSMENT
PAIN_FUNCTIONAL_ASSESSMENT: 0-10

## 2023-11-20 ASSESSMENT — PAIN SCALES - GENERAL
PAINLEVEL_OUTOF10: 0 - NO PAIN

## 2023-11-20 NOTE — PROGRESS NOTES
Requested SW speak with patient. SW stated she would follow up. Medical team concerned about patient's support system and understanding of medical care/diagnosis. Will continue to work with medical team on discharge planning needs. Teresa Billings RN, TCC

## 2023-11-20 NOTE — PROGRESS NOTES
SW requested by team to assist in obtaining pertinent information for pt care as pt appears to not be receptive to information. SW attempted to meet with pt however pt was working with another team member. SW to attempt to speak with pt later. TCC notified.    UPDATE ([id number]): SW attempted to meet with pt but pt was asleep and did not arouse at name. TCC notified.    - Sherrell Merlos MSW, LSW

## 2023-11-20 NOTE — PROGRESS NOTES
Subjective   Noel Aquino is a 78 y.o. male on hospital day 3 reports that he is hungry and is otherwise without complaints.  Patient repeatedly mentioned how hungry is how he is displeased that he was not getting to eat despite sitting in front of the empty tray of food he had recently eaten.        Objective     Exam     Vitals:    11/20/23 0029 11/20/23 0543 11/20/23 0823 11/20/23 1304   BP: 143/73 112/62 143/81 (!) 162/98   BP Location: Right arm Right arm     Patient Position: Lying Lying     Pulse: 90 87 86 101   Resp: 18 16 18    Temp: 37.1 °C (98.8 °F) 36.6 °C (97.9 °F) 36.3 °C (97.3 °F) 37.3 °C (99.1 °F)   TempSrc: Temporal Temporal     SpO2: 100% 100% 100% 98%   Weight:       Height:          Intake/Output last 3 shifts:  I/O last 3 completed shifts:  In: 1420 (20.2 mL/kg) [P.O.:920; IV Piggyback:500]  Out: 2200 (31.3 mL/kg) [Urine:2200 (0.9 mL/kg/hr)]  Weight: 70.3 kg     Physical Exam  Vitals reviewed.   Constitutional:       General: He is not in acute distress.  HENT:      Head: Normocephalic and atraumatic.      Mouth/Throat:      Mouth: Mucous membranes are moist.   Cardiovascular:      Rate and Rhythm: Normal rate and regular rhythm.      Pulses: Normal pulses.      Heart sounds: No murmur heard.     No friction rub. No gallop.   Pulmonary:      Breath sounds: No wheezing.      Comments: Patient with some upper airway noises and he was not cooperative with coughing to clear noises.  He did give very weak effort which did not clear additional noises  Abdominal:      General: Bowel sounds are normal. There is no distension.      Palpations: Abdomen is soft.      Tenderness: There is abdominal tenderness. There is no guarding or rebound.      Comments: Patient did not like being touched and especially his abdomen making it difficult to tell if he had actual tenderness.  He denied pain in his abdomen   Musculoskeletal:      Right lower leg: No edema.      Left lower leg: No edema.      Comments:  "Possible trace pedal edema   Skin:     General: Skin is warm and dry.   Neurological:      General: No focal deficit present.      Mental Status: He is alert.   Psychiatric:      Comments: Odd affect            Medications   tiotropium, 2 Inhalation, inhalation, Daily   And  formoterol, 20 mcg, nebulization, q12h  heparin (porcine), 5,000 Units, subcutaneous, q8h  pantoprazole, 40 mg, oral, BID AC  polyethylene glycol, 17 g, oral, Daily  sennosides, 1 tablet, oral, BID       PRN medications: acetaminophen, HYDROmorphone, ipratropium-albuteroL, ondansetron, polyethylene glycol       Labs     All new labs reviewed:  some of the basic labs as follows -     Results from last 7 days   Lab Units 11/20/23  0843 11/19/23  0636 11/18/23  1408   WBC AUTO x10*3/uL 11.2 10.5 11.4*   HEMOGLOBIN g/dL 8.6* 9.0* 9.1*   HEMATOCRIT % 31.1* 31.9* 32.2*   PLATELETS AUTO x10*3/uL 672* 677* 614*   NEUTROS PCT AUTO % 80.4 85.2 87.6   LYMPHS PCT AUTO % 11.5 7.8 5.7   MONOS PCT AUTO % 5.6 4.9 4.8   EOS PCT AUTO % 1.2 0.9 1.1          Results from last 72 hours   Lab Units 11/20/23  0843 11/19/23  0636 11/18/23  1408   SODIUM mmol/L 139 137 139   POTASSIUM mmol/L 4.5 4.2 4.6   CHLORIDE mmol/L 105 105 108*   CO2 mmol/L 26 22 22   BUN mg/dL 20 22 25*   CREATININE mg/dL 1.76* 2.01* 1.96*     Results from last 72 hours   Lab Units 11/20/23  0843 11/19/23  0636 11/18/23  1408   ALBUMIN g/dL 2.7* 2.9* 2.5*     Results from last 72 hours   Lab Units 11/20/23  0843 11/19/23  0636 11/18/23  1408   GLUCOSE mg/dL 80 102* 110*         No results found for: \"TR1\"  Lab Results   Component Value Date    URINECULTURE No significant growth 11/17/2023            Imaging   NM bone whole body  Narrative: Interpreted By:  Trina England and Beyersdorf Conner   STUDY:  NM BONE WHOLE BODY;  11/20/2023 11:38 am      INDICATION:  Signs/Symptoms:MESHA SCC, cancer staging.      COMPARISON:  None.      ACCESSION NUMBER(S):  [id number]      ORDERING CLINICIAN:  RAMEZ STEARNS    "   TECHNIQUE:  DIVISION OF NUCLEAR MEDICINE  BONE SCAN, TRIPHASIC      The patient received an intravenous dose of  26.2 millicuries of  Tc-99m MDP.  Perfusion images of the  skeleton were then acquired.      FINDINGS:  No focal area of increased radiotracer uptake throughout the axial  and appendicular skeleton to suggest osseous metastatic disease.      Multiple foci of increased radiotracer uptake commonly seen in  degenerative joint disease including shoulders, sternoclavicular  joints, SI joints, knees.      Physiologic radiotracer excretion from bilateral kidneys into bladder.      Impression: 1. No scintigraphic evidence of osseous metastatic disease.      I personally reviewed the images/study and I agree with the findings  as stated by resident Brando Briceño. This study was interpreted  at [place], [place].      MACRO:  None      Signed by: Trina England 11/20/2023 12:43 PM  Dictation workstation:   [id number]  MR brain w and wo IV contrast  Narrative: Interpreted By:  Wilfredo Bishop,   STUDY:  MR BRAIN W AND WO IV CONTRAST;  11/20/2023 12:13 am      INDICATION:  Signs/Symptoms: MESHA SCC, cancer staging.      COMPARISON:  Head CT, 07/02/2023      ACCESSION NUMBER(S):  [id number]      ORDERING CLINICIAN:  RAMEZ STEARNS      TECHNIQUE:  Axial T2, FLAIR, DWI, gradient echo T2 and T1 weighted images of the  brain were acquired. Post contrast T1 weighted images were acquired  after administration of 13 mL Dotarem gadolinium based intravenous  contrast. Image quality is degraded by motion, however the patient  was unable to tolerate further imaging and the study was terminated..      FINDINGS:  CSF Spaces: The ventricles, sulci and basal cisterns are appropriate  for the degree of volume loss. No abnormal extra-axial collection      Parenchyma: There is no restricted diffusion to suggest acute  infarction.  Generalized parenchymal volume loss. Nonspecific  T2  hyperintense signal in the cerebral white matter bilaterally. No  evidence of intracranial hemorrhage. There is no mass effect or  midline shift. Limited postcontrast imaging is unremarkable.      Paranasal Sinuses and Mastoids: Scattered paranasal sinus mucosal  thickening. The mastoid air cells are clear.      Impression: 1. Limited exam, no acute intracranial pathology.  2. Limited postcontrast imaging of the brain is unremarkable, however  repeat brain MRI should be considered to exclude intracranial  metastatic disease.      MACRO:  None      Signed by: Wilfredo Bishop 11/20/2023 9:10 AM  Dictation workstation:   [id number]         Assessment and Plan     Squamous cell lung cancer: Bone scan planned an MRI limited without any obvious brain mets.  -Follow-up radiation oncology and med oncology recommendations  -No plan for stenting open airways as left lower lobe deemed nonsalvageable by pulmonary  -No need for anticoagulation of tumor thrombus per vascular medicine recommendations.  They do recommend standard DVT prophylaxis  -We will continue to work with patient on goals of care and patient's capacity to make decisions.  His insight is unclear as he is avoidant in discussion on his cancer.  He does appear where he does have cancer and was made aware of rationale for MRI and bone scan.    Hematemesis versus hemoptysis:  -Appreciate GI input.  If patient has signs of massive GI bleed will alert on-call GI service  -Continue PPI twice daily  -Monitoring hemoglobin  -Advanced diet as per GI permission  -Should patient have massive hemoptysis we will utilize inhaled TXA    COPD:  -Continue inhalers/nebs  -Incentive spirometer every hour while awake doing approximately 10 times per hour    Suspected constipation:  -Bowel care.  We will see if bowel movements help with his reported possible abdominal discomfort    Cardiovascular:  -Currently holding home amlodipine.  Blood pressure remains well above goal we  will resume.  Given overall prognosis very aggressive/strict blood pressure control may be more detrimental some mild hypertension is okay    BPH:  -Resume Flomax    DVT prophylaxis    Physical therapy/Occupational Therapy when appropriate    Yousif Mitchell MD     Of note the above was done with Dragon dictation system.  Note was proofread to minimize errors.

## 2023-11-20 NOTE — PROGRESS NOTES
Noel Aquino is a 78 y.o. male on day 3 of admission presenting with Anemia.    Subjective   NAEON. Patient denies any further hematemesis/hemoptysis. He does not have any shortness of breath, chest pain, fever, chills - but still has long-standing abdominal pain. He is mostly insisting on being fed despite already having had breakfast this morning at the time of the interview.     Objective   Weight: 70.3 kg (155 lb) (11/17/23 0333)    Daily Weight  11/17/23 : 70.3 kg (155 lb)    Last Recorded Vitals  Heart Rate:  []   Temp:  [36.3 °C (97.3 °F)-37.1 °C (98.8 °F)]   Resp:  [16-18]   BP: (112-144)/(62-81)   SpO2:  [99 %-100 %]      Physical Exam  Constitutional:       General: He is not in acute distress.     Appearance: Normal appearance. He is not ill-appearing.   HENT:      Head: Normocephalic and atraumatic.   Eyes:      Extraocular Movements: Extraocular movements intact.      Pupils: Pupils are equal, round, and reactive to light.   Cardiovascular:      Rate and Rhythm: Normal rate. Rhythm irregular.      Pulses: Normal pulses.      Heart sounds: Murmur heard.      No friction rub. No gallop.   Pulmonary:      Effort: Pulmonary effort is normal. No respiratory distress.      Breath sounds: Normal breath sounds.   Abdominal:      General: Abdomen is flat.      Palpations: Abdomen is soft.      Tenderness: There is abdominal tenderness.      Comments: Diffusely tender but at baseline per patient   Musculoskeletal:         General: No swelling or deformity.   Skin:     General: Skin is warm and dry.   Neurological:      Mental Status: He is alert.       Intake/Output last 3 Shifts:  I/O last 3 completed shifts:  In: 1420 (20.2 mL/kg) [P.O.:920; IV Piggyback:500]  Out: 2200 (31.3 mL/kg) [Urine:2200 (0.9 mL/kg/hr)]  Weight: 70.3 kg     Medications  Scheduled medications  tiotropium, 2 Inhalation, inhalation, Daily   And  formoterol, 20 mcg, nebulization, q12h  heparin (porcine), 5,000 Units, subcutaneous,  q8h  pantoprazole, 40 mg, oral, BID AC  polyethylene glycol, 17 g, oral, Daily      Continuous medications     PRN medications  PRN medications: acetaminophen, HYDROmorphone, ipratropium-albuteroL, ondansetron, polyethylene glycol     Relevant Results    Labs  Lab Results   Component Value Date    WBC 11.2 11/20/2023    HGB 8.6 (L) 11/20/2023    HCT 31.1 (L) 11/20/2023    MCV 67 (L) 11/20/2023     (H) 11/20/2023      Results from last 7 days   Lab Units 11/20/23  0843 11/19/23  0636 11/18/23  1408   SODIUM mmol/L 139 137 139   POTASSIUM mmol/L 4.5 4.2 4.6   CHLORIDE mmol/L 105 105 108*   CO2 mmol/L 26 22 22   BUN mg/dL 20 22 25*   CREATININE mg/dL 1.76* 2.01* 1.96*   CALCIUM mg/dL 9.6 9.7 9.5      Results from last 7 days   Lab Units 11/17/23  0406   ALK PHOS U/L 35   BILIRUBIN TOTAL mg/dL 0.4   PROTEIN TOTAL g/dL 7.1   ALT U/L 4*   AST U/L 20             Imaging  No new imaging     Assessment/Plan   Principal Problem:    Anemia    77 y/o M with PMHx of left upper lobe mass, 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 He was HDS though vitals were significant for tachycardia to 132 and labs showed Hb 5.9 on presentation which improved/incremented more than appropriately to 9 after 2u pRBC. He was also started on ceftriaxone. He has elevated Cr to 2.23 from his BL ~2, with BUN elevated to 29. CT angio AP was limited due to diffuse edema and abdominopelvic fluid but showed filling defect of the left pulmonary vein concerning for luminal thrombus though showed no active extravasation within GI tract. CT angio Chest showed interval increase in size of MESHA mass with mass effect on left main pulmonary artery, left interlobar artery, obliteration of left superior pulmonary vein, rounded hypodense filling defect consisted with tumor  thrombus. Pulmonology and GI were consulted, with no place for acute intervention. Oncology was consulted and did not feel there was need for inpatient evaluation. Radiation Oncology was consulted and recommend MRI brain and bone scan for staging.     11/20 Updates   - MRI and Bone scan complete - pending Rad-Onc reccs   - Patient lacks insight. SW on board for capacity eval +/- ethics consultation     #Suspected Hemoptysis vs. 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   - Gastroenterology consulted: IV PPI BIDx14 -> PO PPI BIDx14 -> PO PPD daily, not good candidate for sedation/EGD iso below  - Pulmonology consulted: unlikely hemoptysis, nebulized TXA  as temporizing measure for hemoptysis  - daily CBC  - goal Hgb>7 (has been stable since initial transfusions)     #MESHA mass, SCC, with mass effect  #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 Martinez  - Vascular medicine consulted, tumor thrombus is intraluminal extension of tumor, continue [facility]  - RadOnc consulted, MRI brain and bone scan for staging ordered - pending further reccs      #Sinus tachycardia,  with PACs, improved   ::Suspect hypovolemia as underlying etiology, improved this hospitalization s/p IVF   - EKG showing sinus tachycardia with respiratory variation c/w sinus arrhythmia and frequent PACs   - CTM, get EKGs as needed re-assess volume status as needed    #Abdominal Pain, unclear etiology   #Constipation   ::CT angio AP: diffuse edema and abdominopelvic fluid, moderate stool burdn  - received 2 days of ceftriaxone - will hold and monitor  - Tolerating regular diet   - Bowel Regimen     #Pyuria   ::UA 11/17 appeared dirty, Ucx NGTD  - asymptomatic, hold ceftriaxone and monitor as above     #COPD  - Continue Stiolto  - Duonebs Q4H prn     #HTN  - Holding home amlodipine 5 iso hypotension on admission     #BPH   - Holding home tamsulosin iso hypotension on admission     F : as needed  E: replete lytes prn,  N: Full Diet     DVT prophylaxis: heparin sq  GI prophylaxis: IV PPI BID as per GI recs     Code Status: Full (discussed with patient at bedside upon admission)

## 2023-11-21 ENCOUNTER — APPOINTMENT (OUTPATIENT)
Dept: RADIOLOGY | Facility: HOSPITAL | Age: 78
DRG: 181 | End: 2023-11-21
Payer: MEDICARE

## 2023-11-21 LAB
ALBUMIN SERPL BCP-MCNC: 2.9 G/DL (ref 3.4–5)
ANION GAP SERPL CALC-SCNC: 13 MMOL/L (ref 10–20)
BASOPHILS # BLD MANUAL: 0.18 X10*3/UL (ref 0–0.1)
BASOPHILS NFR BLD MANUAL: 1.8 %
BUN SERPL-MCNC: 21 MG/DL (ref 6–23)
BURR CELLS BLD QL SMEAR: ABNORMAL
CALCIUM SERPL-MCNC: 9.8 MG/DL (ref 8.6–10.6)
CHLORIDE SERPL-SCNC: 106 MMOL/L (ref 98–107)
CO2 SERPL-SCNC: 24 MMOL/L (ref 21–32)
CREAT SERPL-MCNC: 1.67 MG/DL (ref 0.5–1.3)
EOSINOPHIL # BLD MANUAL: 0.18 X10*3/UL (ref 0–0.4)
EOSINOPHIL NFR BLD MANUAL: 1.8 %
ERYTHROCYTE [DISTWIDTH] IN BLOOD BY AUTOMATED COUNT: 30.3 % (ref 11.5–14.5)
GFR SERPL CREATININE-BSD FRML MDRD: 42 ML/MIN/1.73M*2
GLUCOSE SERPL-MCNC: 78 MG/DL (ref 74–99)
HCT VFR BLD AUTO: 31.9 % (ref 41–52)
HGB BLD-MCNC: 8.7 G/DL (ref 13.5–17.5)
HYPOCHROMIA BLD QL SMEAR: ABNORMAL
IMM GRANULOCYTES # BLD AUTO: 0.03 X10*3/UL (ref 0–0.5)
IMM GRANULOCYTES NFR BLD AUTO: 0.3 % (ref 0–0.9)
LYMPHOCYTES # BLD MANUAL: 0.25 X10*3/UL (ref 0.8–3)
LYMPHOCYTES NFR BLD MANUAL: 2.6 %
MAGNESIUM SERPL-MCNC: 2.1 MG/DL (ref 1.6–2.4)
MCH RBC QN AUTO: 18.2 PG (ref 26–34)
MCHC RBC AUTO-ENTMCNC: 27.3 G/DL (ref 32–36)
MCV RBC AUTO: 67 FL (ref 80–100)
MONOCYTES # BLD MANUAL: 0.25 X10*3/UL (ref 0.05–0.8)
MONOCYTES NFR BLD MANUAL: 2.6 %
NEUTS SEG # BLD MANUAL: 8.25 X10*3/UL (ref 1.6–5)
NEUTS SEG NFR BLD MANUAL: 84.2 %
NRBC BLD-RTO: 0 /100 WBCS (ref 0–0)
PHOSPHATE SERPL-MCNC: 2.6 MG/DL (ref 2.5–4.9)
PLATELET # BLD AUTO: 629 X10*3/UL (ref 150–450)
POLYCHROMASIA BLD QL SMEAR: ABNORMAL
POTASSIUM SERPL-SCNC: 4.5 MMOL/L (ref 3.5–5.3)
RBC # BLD AUTO: 4.79 X10*6/UL (ref 4.5–5.9)
RBC MORPH BLD: ABNORMAL
SODIUM SERPL-SCNC: 138 MMOL/L (ref 136–145)
TARGETS BLD QL SMEAR: ABNORMAL
TOTAL CELLS COUNTED BLD: 114
VARIANT LYMPHS # BLD MANUAL: 0.69 X10*3/UL (ref 0–0.3)
VARIANT LYMPHS NFR BLD: 7 %
WBC # BLD AUTO: 9.8 X10*3/UL (ref 4.4–11.3)

## 2023-11-21 PROCEDURE — 99233 SBSQ HOSP IP/OBS HIGH 50: CPT | Performed by: INTERNAL MEDICINE

## 2023-11-21 PROCEDURE — 2500000001 HC RX 250 WO HCPCS SELF ADMINISTERED DRUGS (ALT 637 FOR MEDICARE OP)

## 2023-11-21 PROCEDURE — 1200000002 HC GENERAL ROOM WITH TELEMETRY DAILY

## 2023-11-21 PROCEDURE — 94640 AIRWAY INHALATION TREATMENT: CPT

## 2023-11-21 PROCEDURE — 94760 N-INVAS EAR/PLS OXIMETRY 1: CPT

## 2023-11-21 PROCEDURE — 2500000002 HC RX 250 W HCPCS SELF ADMINISTERED DRUGS (ALT 637 FOR MEDICARE OP, ALT 636 FOR OP/ED)

## 2023-11-21 PROCEDURE — 70553 MRI BRAIN STEM W/O & W/DYE: CPT | Performed by: RADIOLOGY

## 2023-11-21 PROCEDURE — 2500000004 HC RX 250 GENERAL PHARMACY W/ HCPCS (ALT 636 FOR OP/ED)

## 2023-11-21 PROCEDURE — 85027 COMPLETE CBC AUTOMATED: CPT

## 2023-11-21 PROCEDURE — 2550000001 HC RX 255 CONTRASTS

## 2023-11-21 PROCEDURE — 70553 MRI BRAIN STEM W/O & W/DYE: CPT

## 2023-11-21 PROCEDURE — 80069 RENAL FUNCTION PANEL: CPT

## 2023-11-21 PROCEDURE — 83735 ASSAY OF MAGNESIUM: CPT

## 2023-11-21 PROCEDURE — 36415 COLL VENOUS BLD VENIPUNCTURE: CPT

## 2023-11-21 PROCEDURE — A9575 INJ GADOTERATE MEGLUMI 0.1ML: HCPCS

## 2023-11-21 PROCEDURE — 85007 BL SMEAR W/DIFF WBC COUNT: CPT

## 2023-11-21 RX ORDER — LORAZEPAM 2 MG/ML
0.5 INJECTION INTRAMUSCULAR
Status: COMPLETED | OUTPATIENT
Start: 2023-11-21 | End: 2023-11-21

## 2023-11-21 RX ORDER — TAMSULOSIN HYDROCHLORIDE 0.4 MG/1
0.4 CAPSULE ORAL NIGHTLY
Status: DISCONTINUED | OUTPATIENT
Start: 2023-11-21 | End: 2023-11-29 | Stop reason: HOSPADM

## 2023-11-21 RX ORDER — AMLODIPINE BESYLATE 5 MG/1
5 TABLET ORAL DAILY
Status: DISCONTINUED | OUTPATIENT
Start: 2023-11-21 | End: 2023-11-29 | Stop reason: HOSPADM

## 2023-11-21 RX ORDER — BISACODYL 10 MG/1
10 SUPPOSITORY RECTAL DAILY
Status: DISCONTINUED | OUTPATIENT
Start: 2023-11-21 | End: 2023-11-29 | Stop reason: HOSPADM

## 2023-11-21 RX ORDER — LORAZEPAM 2 MG/ML
0.5 INJECTION INTRAMUSCULAR ONCE
Status: DISCONTINUED | OUTPATIENT
Start: 2023-11-21 | End: 2023-11-27

## 2023-11-21 RX ORDER — GADOTERATE MEGLUMINE 376.9 MG/ML
14 INJECTION INTRAVENOUS
Status: COMPLETED | OUTPATIENT
Start: 2023-11-21 | End: 2023-11-21

## 2023-11-21 RX ORDER — HYDROXYZINE HYDROCHLORIDE 25 MG/1
25 TABLET, FILM COATED ORAL EVERY 8 HOURS PRN
Status: DISCONTINUED | OUTPATIENT
Start: 2023-11-21 | End: 2023-11-29 | Stop reason: HOSPADM

## 2023-11-21 RX ADMIN — PANTOPRAZOLE SODIUM 40 MG: 40 TABLET, DELAYED RELEASE ORAL at 17:14

## 2023-11-21 RX ADMIN — AMLODIPINE BESYLATE 5 MG: 5 TABLET ORAL at 08:18

## 2023-11-21 RX ADMIN — SENNOSIDES 8.6 MG: 8.6 TABLET, FILM COATED ORAL at 08:17

## 2023-11-21 RX ADMIN — FORMOTEROL FUMARATE DIHYDRATE 20 MCG: 20 SOLUTION RESPIRATORY (INHALATION) at 10:49

## 2023-11-21 RX ADMIN — TIOTROPIUM BROMIDE INHALATION SPRAY 2 PUFF: 3.12 SPRAY, METERED RESPIRATORY (INHALATION) at 10:51

## 2023-11-21 RX ADMIN — ACETAMINOPHEN 975 MG: 325 TABLET ORAL at 08:17

## 2023-11-21 RX ADMIN — PANTOPRAZOLE SODIUM 40 MG: 40 TABLET, DELAYED RELEASE ORAL at 08:17

## 2023-11-21 RX ADMIN — TAMSULOSIN HYDROCHLORIDE 0.4 MG: 0.4 CAPSULE ORAL at 21:10

## 2023-11-21 RX ADMIN — LORAZEPAM 0.5 MG: 2 INJECTION INTRAMUSCULAR; INTRAVENOUS at 11:14

## 2023-11-21 RX ADMIN — POLYETHYLENE GLYCOL 3350 17 G: 17 POWDER, FOR SOLUTION ORAL at 08:18

## 2023-11-21 RX ADMIN — GADOTERATE MEGLUMINE 14 ML: 376.9 INJECTION INTRAVENOUS at 12:00

## 2023-11-21 RX ADMIN — SENNOSIDES 8.6 MG: 8.6 TABLET, FILM COATED ORAL at 21:10

## 2023-11-21 RX ADMIN — BISACODYL 10 MG: 10 SUPPOSITORY RECTAL at 08:18

## 2023-11-21 ASSESSMENT — COGNITIVE AND FUNCTIONAL STATUS - GENERAL
CLIMB 3 TO 5 STEPS WITH RAILING: A LITTLE
WALKING IN HOSPITAL ROOM: A LITTLE
STANDING UP FROM CHAIR USING ARMS: A LITTLE
MOBILITY SCORE: 21
DAILY ACTIVITIY SCORE: 24

## 2023-11-21 ASSESSMENT — PAIN SCALES - GENERAL
PAINLEVEL_OUTOF10: 0 - NO PAIN

## 2023-11-21 ASSESSMENT — PAIN - FUNCTIONAL ASSESSMENT
PAIN_FUNCTIONAL_ASSESSMENT: 0-10
PAIN_FUNCTIONAL_ASSESSMENT: 0-10

## 2023-11-21 NOTE — HOSPITAL COURSE
Noel Aquino is a 78 y.o. male with PMHx of left upper lobe mass recently diagnosed in 2023 to be SCC, but lost to outpatient follow-up, PE, COPD, CKD, duodenal AV malformation, GI bleed, polysubstance use disorder (etoh, tobacco, cocaine),  JOSÉ MIGUEL, HTN, BPH presenting to [facility]  with a chief complaint of hematemesis vs. hemoptysis and abdominal pain. He is a very challenging and confrontation history and refuses to answer most questions, but these complaints appeared to be chronic.     ED Course:  Vitals on presentation: T 36.9 °C (98.5 °F)  HR (!) 132  /76  RR 15  O2 96 % None (Room air)   Initial laboratory evaluation showed:  CBC: WBC 10.2, Hb 5.9 (BL~7), Plt 616  CMP: Na 135, K 5.2 (hemolyzed), Cl 104, CO2 23, BUN elevated to 29, Cr 2.23 (BL~2), EGFR 30, Ca 9.6, Alb 2.7, AST 20, ALT 4, ALP 35, tBili 0.4, Mag 2.03  Lipase 42   VB.38/41/24.3/Lac1.9  EtOH <10  UDS positive for Cocain   UA with 3+ Leuk Esterace, WBC 11-20, Spec Grav 1.048    CT angio AP was limited due to edema and abdominopelvic fluid but showed no active extravasation within the GI tract, but did note heterogenous mass within left upper lobe and filling defect of left pulmonary vein.    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    Patient was provided with 2u pRBCs and given Zofran, Protonix, Diluadid 0.5mg. He was also given a dose of ceftriaxone due to initial concerns for SBP/UTI (later discontinued) before being admitted to medicine for further management.    GI was consulted and did not recommend endoscopic evaluation due to risk for anesthesia and patient was started on a PPI. Pulmonology was consulted and did not feel the patient had hemoptysis based on his imaging and did not see any location amenable to bronchoscopic  stenting. Vascular medicine was consulted and felt the thrombus seen was likely an extension of his tumor and patient did not need therapeutic anticoagulation. MedOnc was consulted and did not feel there was inpatient indication for intervention. RadOnc was consulted and recommended staging studies to determine if patient would need palliative radiation or definitive co-treatment with chemo/rad. Patient underwent NM bone scan with no evidence of bony disease. MRI brain was attempted twice, but patient was unable to tolerate it for unclear reasons and kept moving during the exams limiting diagnostic value. Furthermore, concerns were raised that the patient lacks capacity as he largely refused to participate in his care and when discussing his diagnosis of cancer or treatment, and would constantly demand food or just to be left alone. He further refused to identify any family members of CARLOS ALBERTO to contact. As a result, SW and ethics were engaged. However patient refused to participate in discussions, though was consistent in his desire to be treated for cancer and was defaulted as having capacity. As such, MRI brain with anesthesia was obtained (cleared by anesthesiology) which showed no evidence of mets to the brain. PT/OT recommended moderate intensity of continued care. As such, he was planned to be discharged in stable condition to SNF with plan for outpatient PET and close follow-up with HemeOnc (Dr. Martinez) and Radiation Oncology to discuss curative vs. Palliative radiation. However, per transition care coordinator,the patient declined to go to [facility], stating he wants to go home with his daughter to live in her home in [place]. TCC explained that we won't be able to set up home care as his PCP is here in [place] and can't order home care for [place]. The patient was in agreement with the plan and the daughter is going to transport the patient by car to her home in [place].

## 2023-11-21 NOTE — PROGRESS NOTES
CC: Morbid Obesity    Subjective:     Gilbert Horton is a 43 year old obese male who presents to discuss options for weight reduction surgery. He has exhausted attempts at medical weight loss and wishes to pursue surgery for durable control of excess weight and the medical problems that results from their obesity.  He is undecided and which operation he would like to have.    Informational Seminar:  Online seminar    Dietary Attempts:  Low-calorie diet, fasting, eliminating all call    Diet Difficulties:  Diabetic diet    Exercise:  Walking    Heaviest weight is 409.  The most he has been able to lose is  50 #, by dieting.     Smoking History:  None    GERD:  None    BHUMI:  Had a consult prior to the pandemic    DM:  Patient has been a diabetic for 8 years.  On insulin.  Two medications.  Last hemoglobin A1 c was 5.6    Mobility Status:  Fully independent    Obesity-related Medical Conditions:  Diabetes, hyperlipidemia, hypertension    Previous Mental Health Hospitalizations:  None      Past Medical History:   Diagnosis Date   • Chronic venous insufficiency    • Depression    • Diabetes mellitus type 2, uncontrolled (CMS/HCC)    • Diabetic macular edema (CMS/HCC)    • Dyslipidemia    • Essential (primary) hypertension    • Obesity       Past Surgical History:   Procedure Laterality Date   • Ct angiogram coronary arteries  1/27/2020      ALLERGIES:   Allergen Reactions   • Penicillins      Diagnosed as a baby- unknown reaction      Social History     Tobacco Use   • Smoking status: Former Smoker     Packs/day: 0.25     Years: 1.00     Pack years: 0.25     Types: Cigarettes   • Smokeless tobacco: Never Used   Substance Use Topics   • Alcohol use: No     Alcohol/week: 0.0 standard drinks      No family history on file.   Prior to Admission medications    Medication Sig Start Date End Date Taking? Authorizing Provider   hydrochlorothiazide (HYDRODIURIL) 25 MG tablet TAKE ONE TABLET BY MOUTH DAILY 9/5/21  Yes Sharif  Physical Therapy                 Therapy Communication Note    Patient Name: Noel Aquino  MRN: [id number]  Today's Date: 11/21/2023     Discipline: Physical Therapy    Missed Visit Reason: Missed Visit Reason:  (off floor, will re-attempt when schedule permits and pt available)    Missed Time: Attempt    Comment:   MD Linn   lisinopril (ZESTRIL) 20 MG tablet TAKE TWO TABLETS BY MOUTH DAILY 8/30/21  Yes Tejal Brooks NP   carvedilol (COREG) 25 MG tablet TAKE ONE TABLET BY MOUTH TWICE A DAY 8/30/21  Yes Tejal Brooks NP   metFORMIN (GLUCOPHAGE-XR) 500 MG 24 hr tablet TAKE TWO TABLETS BY MOUTH DAILY WITH BREAKFAST (DOSE CHANGE) 8/30/21  Yes SAMREEN Mitchell   aspirin (ASPIRIN LOW DOSE) 81 MG EC tablet Take 1 tablet by mouth daily. Overdue for yearly follow up apt., please call to schedule for future refills. 6/1/21  Yes Sharif Esteves MD   Blood Glucose Monitoring Suppl (FreeStyle Precision Ruddy System) w/Device Kit Test blood sugar 3 times daily. 2/24/21  Yes SAMREEN Mitchell   blood glucose (FreeStyle Precision Ruddy Test) test strip Test blood sugar 3 times daily. Diagnosis: E11.65. Meter: FreeStyle Precision Ruddy 2/24/21  Yes SAMREEN Mitchell   semaglutide,0.25 or 0.5 mg/DOSE, (Ozempic, 0.25 or 0.5 MG/DOSE,) (1.34 mg/ml) 0.25 or 0.5 MG/DOSE injection Inject 0.5 mg into the skin 1 day a week. 2/8/21  Yes SAMREEN Mitchell   blood glucose test strip Test blood sugar 2 times a day. 1/8/21  Yes Tejal Brooks NP   sildenafil (VIAGRA) 50 MG tablet Take 1 tablet by mouth daily as needed at least 30 minutes prior to sexual activity. 12/28/20  Yes Tejal Brooks NP   amLODIPine (NORVASC) 10 MG tablet Take 1 tablet by mouth daily. Overdue for annual appointment with MD. Must schedule prior to next refill. 8/25/20  Yes Tejal Brooks NP   atorvastatin (LIPITOR) 40 MG tablet TAKE ONE TABLET BY MOUTH ONCE NIGHTLY 7/23/20  Yes Sharif Esteves MD   LANCETS ULTRA FINE Misc Use as directed to test blood sugars up to four times daily. 2/10/20  Yes SAMREEN Mitchell   Insulin Pen Needle (VETO PEN NEEDLES) 31G X 8 MM Misc Use as directed to inject insulin twice daily. 11/11/19  Yes Tejal Brooks NP   cloNIDine (CATAPRES) 0.2 MG tablet Take 1 tablet by mouth 3 times daily.  12/2/19 10/14/21  Sharif Esteves MD   ketoconazole (NIZORAL) 2 % cream Apply to affected area daily for up to 8 weeks. 11/5/19 10/14/21  Morales Allred DPM   cloNIDine (CATAPRES) 0.1 MG tablet Take 1 tablet by mouth 3 times daily. 10/28/19 10/14/21  Sharif Esteves MD   Blood Glucose Monitoring Suppl w/Device Kit Use to test blood glucose twice a day as directed. 5/7/19 10/14/21  Tejal Brooks NP       Review of Systems   General/Constitutional:  Negative for fever, chills.  HEENT:  No dysphagia or odynophagia.   Cardiovascular:  Negative for palpitations or chest pain.    Respiratory:  Negative for chronic or productive cough or hemoptysis.    Gastrointestinal:  No abdominal pain, nausea, vomiting, or changes in bowel habits.    Genitourinary: Negative for dysuria or kidney stones  Musculoskeletal: Negative for other arthralgias, myalgias, or back pain than described.   Neurological: Negative for seizures.   Psychiatric: Negative for untreated disorders.   Lymph/Heme: No history clotting or bleeding disorders.   Endocrine: Negative for thyroid or other endocrine disorders   Skin: Negative for acute skin lesions.       Objective:     Visit Vitals  /82   Pulse 76   Ht 5' 11.75\" (1.822 m)   Wt (!) 162.5 kg (358 lb 4 oz)   BMI 48.93 kg/m²    Body mass index is 48.93 kg/m². (!) 162.5 kg (358 lb 4 oz)    Constitutional: He is oriented and developed, nourished, and not distressed.   HENT:Head: Normocephalic. Eyes: Pupils are equal, round, and reactive to light.        Pulmonary/Chest: Effort normal and no respiratory distress.   Abdominal: Normal appearance, no distension and no mass. No tenderness.  Musculoskeletal: Grossly normal range of motion. 1+ edema.   Neurological: He is alert and oriented. No focal abnormalities.                                      Skin: Skin is warm, dry and intact. No cyanosis. Nails show no clubbing.   Psychiatric: Affect normal.     Data Review:  Lab Results   Component  Value Date    WBC 6.0 12/17/2020    WBC 6.8 04/23/2019    MCV 84.6 12/17/2020    MCV 82.7 04/23/2019     Lab Results   Component Value Date    SODIUM 137 12/17/2020    SODIUM 141 11/25/2019    POTASSIUM 4.5 12/17/2020    POTASSIUM 4.2 11/25/2019    GLUCOSE 104 (H) 12/17/2020    GLUCOSE 104 (H) 11/25/2019    CHLORIDE 105 12/17/2020    CHLORIDE 107 11/25/2019    CO2 28 12/17/2020    CO2 30 11/25/2019    BUN 22 (H) 12/17/2020    BUN 29 (H) 11/25/2019    CREATININE 0.99 12/17/2020    CREATININE 0.77 01/17/2020     Lab Results   Component Value Date    AST 29 12/17/2020    AST 20 04/23/2019    BILIRUBIN 0.7 12/17/2020    BILIRUBIN 0.7 04/23/2019     Lab Results   Component Value Date    HGBA1C 5.4 12/17/2020    HGBA1C 6.0 (H) 01/17/2020       I have independently visualized and interpreted all images and endoscopies.    Assessment:     Morbid Obesity     Plan:     The patient was informed of their options that included continued, nonoperative medical management, as well as, other available surgical options such as adjustable gastric banding, sleeve and RNYGB.  We discussed these operations, how they achieve weight loss and lifestyle changes required for success, including that surgery is only a tool and results are not guaranteed.     We also discussed the risks, complications or side-effects whenever someone has an operation or medical procedure.  We did talk about the major and most common complications that could happen after this operation including: Death, heart and lung problems (heart attack, stroke, abnormal heart rhythm), blood clots and pulmonary embolism, leaks at the suture lines in the bowel, bleeding requiring transfusion or reoperation, conversion to an open operation, wound complications such as infections and hernias, strictures in the pouch, nutritional problems including low iron, B12, calcium, and others, gall stones, body image issues related to loose skin, internal hernias, marginal ulcers  (shouldn't smoke or take NSAIDs), watch consumption of alcohol, and other rare non-discussed complications    We discussed that our goal is to lose weight and ameliorate medical problems and not to obtain a specific body mass index.     We discussed the need for dietary evaluation, psychological evaluation, and attendance at the bariatric informational seminar. These consults will be placed.    We will plan for Andrew-en-Y gastric bypass. Patient understands the need for pre-operative EGD and liver shrink diet. We also discussed the need for urine nicotine and drug screen today. Once again, we discussed our bariatric program policy of no smoking and drug policy in detail. They consent to urine screen.  Patient also understands the need for preoperative physical therapy evaluation and pre hab. Patient needs to call sleep medicine to complete his consult      Hany Naranjo MD      In addition to clearance from the Bariatric Program, the patient will need the following completed preoperatively:     1. Pre-op EGD  2. Sleep Study

## 2023-11-21 NOTE — PROGRESS NOTES
"SW attempted to meet pt bedside. Pt woke slightly when SW approached and spoke to him but was unable to stay awake long enough to engage. At this SW last attempt, pt did not arouse at all and went back into a deep sleep. [facility] notified.    UPDATE (1119): This SW confirmed the pt address of [address] First pt said he lives alone and then he said he doesn't but when I asked him who he lived with he said \"I don't know.\" I asked if he had siblings to which he responded 14 but he doesn't speak with them often and wouldn't call them if he needs anything. When I asked who he does reach out to for help he said \"I don't need no help.\" This SW asked if he went home who would he ask for help from. He said \"I don't know shit.\" Pt then kept repeating \"I don't know\" over and over and said \"I don't know nothing.\" Staff came into room to take pt down to MRI. [facility] notified of pt interaction with this SW.    - Sherrell Merlos MSW, LSW    "

## 2023-11-21 NOTE — PROGRESS NOTES
"  Subjective   Noel Aquino is a 78 y.o. male on hospital day 4 reports he is doing well.  He states he is getting enough to eat now and is content.      Objective     Exam     Vitals:    11/21/23 0812 11/21/23 1049 11/21/23 1247 11/21/23 1522   BP: 143/78  (!) 140/110 129/75   Pulse: 84  82 97   Resp: 18  18 18   Temp: 36.5 °C (97.7 °F)      TempSrc:       SpO2: 98% 96% 99% 97%   Weight:       Height:          Intake/Output last 3 shifts:  I/O last 3 completed shifts:  In: 720 (10.2 mL/kg) [P.O.:720]  Out: 1900 (27 mL/kg) [Urine:1900 (0.8 mL/kg/hr)]  Weight: 70.3 kg     Physical Exam  Vitals reviewed.   Constitutional:       General: He is not in acute distress.  HENT:      Head: Normocephalic and atraumatic.      Mouth/Throat:      Mouth: Mucous membranes are moist.   Cardiovascular:      Rate and Rhythm: Normal rate and regular rhythm.      Pulses: Normal pulses.      Heart sounds: Murmur (2 out of 6 greatest at the right upper sternal border) heard.      No friction rub. No gallop.   Pulmonary:      Breath sounds: No wheezing.      Comments: Patient with some upper airway noises and he was not cooperative with coughing to clear noises.  He did give very weak effort which did not clear additional noises  Abdominal:      General: Bowel sounds are normal. There is no distension.      Comments: Patient refused to allow me to examine his abdomen because he says it makes him \"queasy\" but denies outright pain.  Despite using the term queasy he says it does not make him nauseated    Musculoskeletal:      Right lower leg: No edema.      Left lower leg: No edema.      Comments: Possible trace pedal edema   Skin:     General: Skin is warm and dry.   Neurological:      General: No focal deficit present.      Mental Status: He is alert.   Psychiatric:      Comments: Odd affect            Medications   amLODIPine, 5 mg, oral, Daily  bisacodyl, 10 mg, rectal, Daily  tiotropium, 2 Inhalation, inhalation, Daily   " "And  formoterol, 20 mcg, nebulization, q12h  heparin (porcine), 5,000 Units, subcutaneous, q8h  LORazepam, 0.5 mg, intravenous, Once  pantoprazole, 40 mg, oral, BID AC  polyethylene glycol, 17 g, oral, Daily  sennosides, 1 tablet, oral, BID  tamsulosin, 0.4 mg, oral, Nightly       PRN medications: acetaminophen, HYDROmorphone, hydrOXYzine HCL, ipratropium-albuteroL, ondansetron, polyethylene glycol       Labs     All new labs reviewed:  some of the basic labs as follows -     Results from last 7 days   Lab Units 11/21/23 0657 11/20/23  0843 11/19/23  0636 11/18/23  1408   WBC AUTO x10*3/uL 9.8 11.2 10.5 11.4*   HEMOGLOBIN g/dL 8.7* 8.6* 9.0* 9.1*   HEMATOCRIT % 31.9* 31.1* 31.9* 32.2*   PLATELETS AUTO x10*3/uL 629* 672* 677* 614*   NEUTROS PCT AUTO %  --  80.4 85.2 87.6   LYMPHO PCT MAN % 2.6  --   --   --    LYMPHS PCT AUTO %  --  11.5 7.8 5.7   MONO PCT MAN % 2.6  --   --   --    MONOS PCT AUTO %  --  5.6 4.9 4.8   EOSINO PCT MAN % 1.8  --   --   --    EOS PCT AUTO %  --  1.2 0.9 1.1            Results from last 72 hours   Lab Units 11/21/23 0657 11/20/23  0843 11/19/23  0636   SODIUM mmol/L 138 139 137   POTASSIUM mmol/L 4.5 4.5 4.2   CHLORIDE mmol/L 106 105 105   CO2 mmol/L 24 26 22   BUN mg/dL 21 20 22   CREATININE mg/dL 1.67* 1.76* 2.01*       Results from last 72 hours   Lab Units 11/21/23 0657 11/20/23  0843 11/19/23  0636   ALBUMIN g/dL 2.9* 2.7* 2.9*       Results from last 72 hours   Lab Units 11/21/23  0657 11/20/23  0843 11/19/23  0636   GLUCOSE mg/dL 78 80 102*           No results found for: \"TR1\"  Lab Results   Component Value Date    URINECULTURE No significant growth 11/17/2023            Imaging   MR brain w and wo IV contrast  Narrative: Interpreted By:  Faizan Vickers,   STUDY:  MR BRAIN W AND WO IV CONTRAST;  11/21/2023 12:16 pm      INDICATION:  Signs/Symptoms:staging.      COMPARISON:  Incomplete motion degraded study dated 11/19/2023      ACCESSION NUMBER(S):  [id number]      ORDERING " CLINICIAN:  PARAG CANALES      TECHNIQUE:  Only motion degraded axial diffusion, axial T2, axial FLAIR, axial  gradient echo T2, and axial T1 weighted MRI images of the brain  without gadolinium could be obtained. Per the MRI technologist notes,  the patient began climbing out of the scanner and refused to continue  with the study following the administration of gadolinium. No post  gadolinium MRI imaging could be obtained. The patient received 14 mL  of Dotarem gadolinium intravenously.      FINDINGS:  The study is incomplete with the obtained images limited by motion.  No post gadolinium MRI imaging could be obtained.      Within the limitations of the study, no definite abnormal diffusion  restriction to suggest acute infarction is noted.      There is again evidence of moderate brain parenchymal volume loss  similar when compared with the prior study dated 11/19/2023.      Nonspecific white matter changes are noted within the cerebral  hemispheres bilaterally as well as ill-defined increased signal on  the FLAIR and T2 images overlying the brainstem which while  nonspecific, given the patient's age, likely represent sequelae of  more remote small-vessel ischemic change.      There is no midline shift. The suprasellar/basilar cisterns are  patent.      There is mild mucosal thickening within the paranasal sinuses.          Impression: The study is incomplete with the obtained images limited by motion.  No post gadolinium MRI imaging could be obtained.      Within the limitations of the study, there is again evidence of  moderate brain parenchymal volume loss similar when compared with the  prior study dated 11/19/2023.      Nonspecific white matter changes are noted within the cerebral  hemispheres bilaterally as well as ill-defined increased signal on  the FLAIR and T2 images overlying the brainstem which while  nonspecific, given the patient's age, likely represent sequelae of  more remote small-vessel ischemic  change.      MACRO:  None.      Signed by: Faizan Samsoney 11/21/2023 2:35 PM  Dictation workstation:   [id number]         Assessment and Plan     Squamous cell lung cancer: Bone scan planned and repeat MRI limited without any obvious brain mets.  -Follow-up radiation oncology and med oncology recommendations.  May need to have anesthesia assist with MRI to complete satisfactory imaging  -No plan for stenting open airways as left lower lobe deemed nonsalvageable by pulmonary  -No need for anticoagulation of tumor thrombus per vascular medicine recommendations.  They do recommend standard DVT prophylaxis  -We will continue to work with patient on goals of care and patient's capacity to make decisions.  His insight is unclear as he is avoidant in discussion on his cancer.  He does appear to be aware that he does have cancer and was aware of rationale for repeat MRI this morning    Hematemesis versus hemoptysis: Hemoglobin stable without any reports of gross blood loss  -Appreciate GI input.  If patient has signs of massive GI bleed will alert on-call GI service  -Continue PPI twice daily  -Monitoring hemoglobin  -Should patient have massive hemoptysis we will utilize inhaled TXA    COPD:  -Continue inhalers/nebs  -Incentive spirometer every hour while awake doing approximately 10 times per hour    Suspected constipation:  -Bowel care.  We will see if bowel movements help with his reported possible abdominal discomfort    Cardiovascular:  -Currently holding home amlodipine.  Blood pressure remains well above goal we will resume low-dose (i.e. 2.5 mg).  Given overall prognosis very aggressive/strict blood pressure control may be more detrimental some mild hypertension is okay    Acute renal failure: Creatinine appears near baseline now  -Trend renal function panel  -Avoid nephrotoxic age    BPH:  -Continue Flomax    DVT prophylaxis    Physical therapy/Occupational Therapy when appropriate    Yousif Mitchell MD      Of note the above was done with Dragon dictation system.  Note was proofread to minimize errors.

## 2023-11-21 NOTE — CARE PLAN
The patient's goals for the shift include  feel better.    The clinical goals for the shift include be free from falls or injury for duration of shift.     Over the shift, the patient made progress towards all goals.

## 2023-11-21 NOTE — CARE PLAN
The patient's goals for the shift include      The clinical goals for the shift include Kenneth maintained safety during shift.

## 2023-11-21 NOTE — PROGRESS NOTES
Noel Aquino is a 78 y.o. male on day 4 of admission presenting with Anemia.    Subjective   MRI complete yesterday but not optimal due to poor patient tolerance and no good post-contrast imaging, reordered. YASH. At the bedside this morning the patient states he has not had further hemoptysis or hematemesis, feels well overall and with no chest pain or shortness of breath. Still has chronic abdominal pain and states he has not had a BM since he was here. Increased agitation and noncompliance with medications noted by nursing staff.     Objective   Weight: 70.3 kg (155 lb) (11/17/23 0333)    Daily Weight  11/17/23 : 70.3 kg (155 lb)    Last Recorded Vitals  Heart Rate:  []   Temp:  [36.3 °C (97.3 °F)-37.6 °C (99.7 °F)]   Resp:  [16-19]   BP: (122-163)/(64-98)   SpO2:  [97 %-100 %]      Physical Exam  Constitutional:       General: He is not in acute distress.     Appearance: Normal appearance. He is not ill-appearing.   HENT:      Head: Normocephalic and atraumatic.   Eyes:      Extraocular Movements: Extraocular movements intact.      Pupils: Pupils are equal, round, and reactive to light.   Cardiovascular:      Rate and Rhythm: Normal rate. Rhythm irregular.      Pulses: Normal pulses.      Heart sounds: Murmur heard.      No friction rub. No gallop.   Pulmonary:      Effort: Pulmonary effort is normal. No respiratory distress.      Breath sounds: Normal breath sounds.   Abdominal:      General: Abdomen is flat.      Palpations: Abdomen is soft.      Tenderness: There is abdominal tenderness.      Comments: Diffusely tender but at baseline per patient   Musculoskeletal:         General: No swelling or deformity.   Skin:     General: Skin is warm and dry.   Neurological:      Mental Status: He is alert.       Intake/Output last 3 Shifts:  I/O last 3 completed shifts:  In: 720 (10.2 mL/kg) [P.O.:720]  Out: 1900 (27 mL/kg) [Urine:1900 (0.8 mL/kg/hr)]  Weight: 70.3 kg     Medications  Scheduled  medications  amLODIPine, 5 mg, oral, Daily  tiotropium, 2 Inhalation, inhalation, Daily   And  formoterol, 20 mcg, nebulization, q12h  heparin (porcine), 5,000 Units, subcutaneous, q8h  LORazepam, 0.5 mg, intravenous, Once  pantoprazole, 40 mg, oral, BID AC  polyethylene glycol, 17 g, oral, Daily  sennosides, 1 tablet, oral, BID  surgical lubricant, , ,   tamsulosin, 0.4 mg, oral, Nightly      Continuous medications     PRN medications  PRN medications: acetaminophen, HYDROmorphone, ipratropium-albuteroL, ondansetron, polyethylene glycol, surgical lubricant     Relevant Results    Labs  Lab Results   Component Value Date    WBC 11.2 11/20/2023    HGB 8.6 (L) 11/20/2023    HCT 31.1 (L) 11/20/2023    MCV 67 (L) 11/20/2023     (H) 11/20/2023      Results from last 7 days   Lab Units 11/20/23  0843 11/19/23  0636 11/18/23  1408   SODIUM mmol/L 139 137 139   POTASSIUM mmol/L 4.5 4.2 4.6   CHLORIDE mmol/L 105 105 108*   CO2 mmol/L 26 22 22   BUN mg/dL 20 22 25*   CREATININE mg/dL 1.76* 2.01* 1.96*   CALCIUM mg/dL 9.6 9.7 9.5      Results from last 7 days   Lab Units 11/17/23  0406   ALK PHOS U/L 35   BILIRUBIN TOTAL mg/dL 0.4   PROTEIN TOTAL g/dL 7.1   ALT U/L 4*   AST U/L 20             Imaging  No new imaging     Assessment/Plan   Principal Problem:    Anemia    79 y/o M with PMHx of left upper lobe mass, 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 He was HDS though vitals were significant for tachycardia to 132 and labs showed Hb 5.9 on presentation which improved/incremented more than appropriately to 9 after 2u pRBC. He was also started on ceftriaxone. He has elevated Cr to 2.23 from his BL ~2, with BUN elevated to 29. CT angio AP was limited due to diffuse edema and abdominopelvic fluid but showed filling defect of the left  pulmonary vein concerning for luminal thrombus though showed no active extravasation within GI tract. CT angio Chest showed interval increase in size of MESHA mass with mass effect on left main pulmonary artery, left interlobar artery, obliteration of left superior pulmonary vein, rounded hypodense filling defect consisted with tumor thrombus. Pulmonology and GI were consulted, with no place for acute intervention. Oncology was consulted and did not feel there was need for inpatient evaluation. Radiation Oncology was consulted and recommend MRI brain and bone scan for staging.     11/21 Updates   - NM bone scan negative   - MRI reordered with ativan - pending Rad-Onc reccs   - Bisocodyl suppository   - Hydralazine 25mg Q8H prn agitation   - Patient lacks insight. SW on board for capacity eval +/- ethics consultation     #Suspected Hemoptysis vs. 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   - Gastroenterology consulted: IV PPI BIDx14 -> PO PPI BIDx14 (-12/3) -> PO PPD daily, not good candidate for sedation/EGD iso below, will call if having signs of massive GI bleed  - Pulmonology consulted: unlikely hemoptysis, nebulized TXA  as temporizing measure for hemoptysis  - daily CBC  - goal Hgb>7 (has been stable since initial transfusions)     #MESHA mass, SCC, with mass effect  #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 Martinez  - Vascular medicine consulted, tumor thrombus is intraluminal extension of tumor, continue SQH  - RadOnc consulted, MRI brain and bone scan for staging ordered - pending further reccs      #Sinus tachycardia, with PACs, improved   ::Suspect hypovolemia as underlying etiology, improved this hospitalization s/p IVF   - EKG showing sinus tachycardia with respiratory variation c/w sinus arrhythmia and frequent PACs   - CTM, get EKGs as needed re-assess volume status as needed    #Abdominal Pain, unclear etiology   #Constipation   ::CT angio AP: diffuse edema and abdominopelvic fluid, moderate stool burdn  - received 2 days of ceftriaxone - will hold and monitor  - Tolerating regular diet   - Bowel Regimen (Miralax/Sennosides), will try Bisocodyl suppository today      #Pyuria   ::UA 11/17 appeared dirty, Ucx NGTD  - asymptomatic, hold ceftriaxone and monitor as above     #COPD  - Continue Stiolto  - Duonebs Q4H prn     #HTN  - c/w home Amlodipine 5     #BPH   - c/w home Tamsulosin      F : as needed  E: replete lytes prn,  N: Full Diet     DVT prophylaxis: heparin sq  GI prophylaxis: IV PPI BID as per GI recs     Code Status: Full (discussed with patient at bedside upon admission)

## 2023-11-22 LAB
ALBUMIN SERPL BCP-MCNC: 2.6 G/DL (ref 3.4–5)
ANION GAP SERPL CALC-SCNC: 11 MMOL/L (ref 10–20)
BASOPHILS # BLD MANUAL: 0.25 X10*3/UL (ref 0–0.1)
BASOPHILS NFR BLD MANUAL: 2.6 %
BUN SERPL-MCNC: 25 MG/DL (ref 6–23)
CALCIUM SERPL-MCNC: 9.7 MG/DL (ref 8.6–10.6)
CHLORIDE SERPL-SCNC: 106 MMOL/L (ref 98–107)
CO2 SERPL-SCNC: 26 MMOL/L (ref 21–32)
CREAT SERPL-MCNC: 1.76 MG/DL (ref 0.5–1.3)
DACRYOCYTES BLD QL SMEAR: ABNORMAL
EOSINOPHIL # BLD MANUAL: 0.25 X10*3/UL (ref 0–0.4)
EOSINOPHIL NFR BLD MANUAL: 2.6 %
ERYTHROCYTE [DISTWIDTH] IN BLOOD BY AUTOMATED COUNT: 30.3 % (ref 11.5–14.5)
GFR SERPL CREATININE-BSD FRML MDRD: 40 ML/MIN/1.73M*2
GLUCOSE BLD MANUAL STRIP-MCNC: 149 MG/DL (ref 74–99)
GLUCOSE SERPL-MCNC: 80 MG/DL (ref 74–99)
HCT VFR BLD AUTO: 30.4 % (ref 41–52)
HGB BLD-MCNC: 8.5 G/DL (ref 13.5–17.5)
HYPOCHROMIA BLD QL SMEAR: ABNORMAL
IMM GRANULOCYTES # BLD AUTO: 0.04 X10*3/UL (ref 0–0.5)
IMM GRANULOCYTES NFR BLD AUTO: 0.4 % (ref 0–0.9)
LYMPHOCYTES # BLD MANUAL: 0.93 X10*3/UL (ref 0.8–3)
LYMPHOCYTES NFR BLD MANUAL: 9.6 %
MAGNESIUM SERPL-MCNC: 2.04 MG/DL (ref 1.6–2.4)
MCH RBC QN AUTO: 18.7 PG (ref 26–34)
MCHC RBC AUTO-ENTMCNC: 28 G/DL (ref 32–36)
MCV RBC AUTO: 67 FL (ref 80–100)
MONOCYTES # BLD MANUAL: 0.16 X10*3/UL (ref 0.05–0.8)
MONOCYTES NFR BLD MANUAL: 1.7 %
NEUTS SEG # BLD MANUAL: 8.1 X10*3/UL (ref 1.6–5)
NEUTS SEG NFR BLD MANUAL: 83.5 %
NRBC BLD-RTO: 0 /100 WBCS (ref 0–0)
OVALOCYTES BLD QL SMEAR: ABNORMAL
PHOSPHATE SERPL-MCNC: 2.3 MG/DL (ref 2.5–4.9)
PLATELET # BLD AUTO: 628 X10*3/UL (ref 150–450)
POLYCHROMASIA BLD QL SMEAR: ABNORMAL
POTASSIUM SERPL-SCNC: 4.4 MMOL/L (ref 3.5–5.3)
RBC # BLD AUTO: 4.55 X10*6/UL (ref 4.5–5.9)
RBC MORPH BLD: ABNORMAL
SCHISTOCYTES BLD QL SMEAR: ABNORMAL
SODIUM SERPL-SCNC: 139 MMOL/L (ref 136–145)
TARGETS BLD QL SMEAR: ABNORMAL
TOTAL CELLS COUNTED BLD: 115
WBC # BLD AUTO: 9.7 X10*3/UL (ref 4.4–11.3)

## 2023-11-22 PROCEDURE — 85027 COMPLETE CBC AUTOMATED: CPT

## 2023-11-22 PROCEDURE — 36415 COLL VENOUS BLD VENIPUNCTURE: CPT

## 2023-11-22 PROCEDURE — 99232 SBSQ HOSP IP/OBS MODERATE 35: CPT | Performed by: INTERNAL MEDICINE

## 2023-11-22 PROCEDURE — 83735 ASSAY OF MAGNESIUM: CPT

## 2023-11-22 PROCEDURE — 2500000001 HC RX 250 WO HCPCS SELF ADMINISTERED DRUGS (ALT 637 FOR MEDICARE OP)

## 2023-11-22 PROCEDURE — 2500000004 HC RX 250 GENERAL PHARMACY W/ HCPCS (ALT 636 FOR OP/ED)

## 2023-11-22 PROCEDURE — 94640 AIRWAY INHALATION TREATMENT: CPT

## 2023-11-22 PROCEDURE — 80069 RENAL FUNCTION PANEL: CPT

## 2023-11-22 PROCEDURE — 97162 PT EVAL MOD COMPLEX 30 MIN: CPT | Mod: GP

## 2023-11-22 PROCEDURE — 82947 ASSAY GLUCOSE BLOOD QUANT: CPT

## 2023-11-22 PROCEDURE — 97165 OT EVAL LOW COMPLEX 30 MIN: CPT | Mod: GO

## 2023-11-22 PROCEDURE — 1200000002 HC GENERAL ROOM WITH TELEMETRY DAILY

## 2023-11-22 PROCEDURE — 85007 BL SMEAR W/DIFF WBC COUNT: CPT

## 2023-11-22 PROCEDURE — 2500000002 HC RX 250 W HCPCS SELF ADMINISTERED DRUGS (ALT 637 FOR MEDICARE OP, ALT 636 FOR OP/ED)

## 2023-11-22 RX ADMIN — SENNOSIDES 8.6 MG: 8.6 TABLET, FILM COATED ORAL at 09:09

## 2023-11-22 RX ADMIN — POLYETHYLENE GLYCOL 3350 17 G: 17 POWDER, FOR SOLUTION ORAL at 09:13

## 2023-11-22 RX ADMIN — AMLODIPINE BESYLATE 5 MG: 5 TABLET ORAL at 09:09

## 2023-11-22 RX ADMIN — FORMOTEROL FUMARATE DIHYDRATE 20 MCG: 20 SOLUTION RESPIRATORY (INHALATION) at 20:47

## 2023-11-22 RX ADMIN — PANTOPRAZOLE SODIUM 40 MG: 40 TABLET, DELAYED RELEASE ORAL at 17:06

## 2023-11-22 RX ADMIN — TAMSULOSIN HYDROCHLORIDE 0.4 MG: 0.4 CAPSULE ORAL at 21:35

## 2023-11-22 RX ADMIN — SENNOSIDES 8.6 MG: 8.6 TABLET, FILM COATED ORAL at 21:35

## 2023-11-22 RX ADMIN — PANTOPRAZOLE SODIUM 40 MG: 40 TABLET, DELAYED RELEASE ORAL at 09:09

## 2023-11-22 ASSESSMENT — COGNITIVE AND FUNCTIONAL STATUS - GENERAL
DAILY ACTIVITIY SCORE: 19
DRESSING REGULAR LOWER BODY CLOTHING: A LITTLE
STANDING UP FROM CHAIR USING ARMS: A LITTLE
MOVING FROM LYING ON BACK TO SITTING ON SIDE OF FLAT BED WITH BEDRAILS: A LITTLE
MOVING TO AND FROM BED TO CHAIR: TOTAL
WALKING IN HOSPITAL ROOM: TOTAL
DAILY ACTIVITIY SCORE: 19
DRESSING REGULAR UPPER BODY CLOTHING: A LITTLE
DRESSING REGULAR LOWER BODY CLOTHING: A LITTLE
PERSONAL GROOMING: A LITTLE
DRESSING REGULAR UPPER BODY CLOTHING: A LITTLE
TOILETING: A LITTLE
MOBILITY SCORE: 12
WALKING IN HOSPITAL ROOM: A LITTLE
PERSONAL GROOMING: A LITTLE
DRESSING REGULAR LOWER BODY CLOTHING: A LITTLE
CLIMB 3 TO 5 STEPS WITH RAILING: TOTAL
HELP NEEDED FOR BATHING: A LITTLE
PERSONAL GROOMING: A LITTLE
TURNING FROM BACK TO SIDE WHILE IN FLAT BAD: A LITTLE
WALKING IN HOSPITAL ROOM: TOTAL
STANDING UP FROM CHAIR USING ARMS: A LITTLE
DAILY ACTIVITIY SCORE: 19
MOVING FROM LYING ON BACK TO SITTING ON SIDE OF FLAT BED WITH BEDRAILS: A LITTLE
HELP NEEDED FOR BATHING: A LITTLE
STANDING UP FROM CHAIR USING ARMS: A LITTLE
MOVING TO AND FROM BED TO CHAIR: A LITTLE
TOILETING: A LITTLE
MOBILITY SCORE: 12
MOVING TO AND FROM BED TO CHAIR: TOTAL
DRESSING REGULAR UPPER BODY CLOTHING: A LITTLE
TURNING FROM BACK TO SIDE WHILE IN FLAT BAD: A LITTLE
CLIMB 3 TO 5 STEPS WITH RAILING: A LITTLE
CLIMB 3 TO 5 STEPS WITH RAILING: TOTAL
MOBILITY SCORE: 20
TOILETING: A LITTLE
HELP NEEDED FOR BATHING: A LITTLE

## 2023-11-22 ASSESSMENT — PAIN SCALES - GENERAL
PAINLEVEL_OUTOF10: 0 - NO PAIN

## 2023-11-22 ASSESSMENT — ACTIVITIES OF DAILY LIVING (ADL): BATHING_ASSISTANCE: MINIMAL

## 2023-11-22 ASSESSMENT — PAIN - FUNCTIONAL ASSESSMENT
PAIN_FUNCTIONAL_ASSESSMENT: 0-10

## 2023-11-22 NOTE — PROGRESS NOTES
"Physical Therapy    Physical Therapy Evaluation    Patient Name: Noel Aquino  MRN: [id number]  Today's Date: 11/22/2023   Time Calculation  Start Time: 0943  Stop Time: 0956  Time Calculation (min): 13 min    Assessment/Plan   PT Assessment  PT Assessment Results: Decreased cognition, Decreased safety awareness, Decreased mobility  Rehab Prognosis: Fair  End of Session Communication: Bedside nurse  Assessment Comment: Pt is a questionable historian, resistant to answering questions. Demo'd grossly WFL strength, self limiting of session to half stand and scooting despite initially being agreeable to attempt standing and ambulation  End of Session Patient Position: Bed, 3 rail up, Alarm off, not on at start of session  IP OR SWING BED PT PLAN  Inpatient or Swing Bed: Inpatient  PT Plan  Treatment/Interventions: Bed mobility, Transfer training, Gait training, Stair training, Balance training, Strengthening, Therapeutic exercise, Therapeutic activity, Home exercise program  PT Plan: Skilled PT  PT Frequency: 3 times per week  PT Discharge Recommendations: Moderate intensity level of continued care  PT - OK to Discharge: Yes ((Eval complete and d/c recommendation made))      Subjective   General Visit Information:  Reason for Referral: hematemesis and abdominal pain  Past Medical History Relevant to Rehab: L upper lobe mass, PE, COPD, CKD  Prior to Session Communication: Bedside nurse  Patient Position Received:  (sitting at EOB leaning toward L side)  Family/Caregiver Present: No  General Comment: Pt calm, often rolling eyes at therapist regarding questioning, cooperative but became self limiting and refused progression of mobility. Lacks insight into deficits and impact on safety   Home Living:  Home Living  Type of Home: House  Home Living Comments: Pt is a poor historian, refused to answer a lot of therapist's questions, reports living in house and states \"there are people\" questionable accuracy  Prior Level of " Function:  Prior Function Per Pt/Caregiver Report  Level of [place]:  (unclear baseline)  Precautions:  Precautions  Medical Precautions: Fall precautions  Vital Signs:  Vital Signs  Heart Rate: 100  SpO2: 98 %    Objective       Pain:  Pain Assessment  Pain Assessment: 0-10  Pain Score: 0 - No pain  Cognition:  Cognition  Overall Cognitive Status: Impaired  Orientation Level: Disoriented to person, Disoriented to time (pt refused to answer location questions or month but did state 23 for year)  Insight: Moderate  Impulsive: Moderately      Extremity/Trunk Assessments:  Strength:           RLE   RLE :  (grossly WFL based on mobility and active movement)  LLE   LLE :  (grossly WFL based on mobility and active movement)    General Assessments:                  Static Sitting Balance  Static Sitting-Balance Support: No upper extremity supported  Static Sitting-Level of Assistance: Close supervision  Static Sitting-Comment/Number of Minutes: pt with preference for L lateral lean, using Estrellita to bring pt to midline       Functional Assessments:  Bed Mobility  Bed Mobility: Yes  Bed Mobility 1  Bed Mobility 1: Scooting (in sitting, along EOB)  Level of Assistance 1: Close supervision  Bed Mobility 2  Bed Mobility  2: Sitting to supine  Level of Assistance 2: Close supervision  Transfers  Transfer: Yes  Transfer 1  Technique 1: Stand to sit, Sit to stand  Transfer Level of Assistance 1: Contact guard  Trials/Comments 1: 50% of standing (pt self limiting to complete full standing prior to returning to sitting EOB, scooting and laying down)             Outcome Measures:  [facility] Basic Mobility  Turning from your back to your side while in a flat bed without using bedrails: A little  Moving from lying on your back to sitting on the side of a flat bed without using bedrails: A little  Moving to and from bed to chair (including a wheelchair): Total  Standing up from a chair using your arms (e.g. wheelchair or bedside chair):  A little  To walk in hospital room: Total  Climbing 3-5 steps with railing: Total  Basic Mobility - Total Score: 12                               Encounter Problems       Encounter Problems (Active)       Balance       Pt will complete dynamic reaching outside BENJIE in standing x5 reps, without LOB (Progressing)       Start:  11/22/23    Expected End:  12/06/23               Mobility       Pt will amb 150ft with LRAD and supervision (Progressing)       Start:  11/22/23    Expected End:  12/06/23               Transfers       Pt will perform sit<>stand with LRAD mod I (Progressing)       Start:  11/22/23    Expected End:  12/06/23            Pt will complete supine<>sit mod I (Progressing)       Start:  11/22/23    Expected End:  12/06/23 11/22/23 at 10:34 AM   Dorota Reyes PT   Rehab Office: [phone]

## 2023-11-22 NOTE — PROGRESS NOTES
"  Subjective   Noel Aquino is a 78 y.o. male on hospital day 5 reports he is doing well.  He denies complaints.  When asked patient about his cancer and what he was hoping to do with that he is eagerly seeking treatment even if it is not curative including chemotherapy and radiation therapy as needed      Objective     Exam     Vitals:    11/22/23 0835 11/22/23 0943 11/22/23 1021 11/22/23 1300   BP: 137/82  131/66 128/82   Pulse: 96 100 76 100   Resp: 12  12 15   Temp: 36.9 °C (98.4 °F)  37.3 °C (99.1 °F) 37 °C (98.6 °F)   TempSrc:       SpO2: 99% 98% 96% 96%   Weight:       Height:          Intake/Output last 3 shifts:  I/O last 3 completed shifts:  In: - (0 mL/kg)   Out: 1500 (21.3 mL/kg) [Urine:1500 (0.6 mL/kg/hr)]  Weight: 70.3 kg     Physical Exam  Vitals reviewed.   Constitutional:       General: He is not in acute distress.  HENT:      Head: Normocephalic and atraumatic.      Mouth/Throat:      Mouth: Mucous membranes are moist.   Cardiovascular:      Rate and Rhythm: Normal rate and regular rhythm.      Pulses: Normal pulses.      Heart sounds: Murmur (2 out of 6 greatest at the right upper sternal border) heard.      No friction rub. No gallop.   Pulmonary:      Breath sounds: No wheezing.      Comments: Patient with some upper airway noises and he was not cooperative with coughing to clear noises.  Somewhat diminished at base  Abdominal:      General: Bowel sounds are normal. There is no distension.      Comments: Patient refused to allow me to palpate his abdomen because he says it makes him \"queasy\" but denies outright pain and when I pushed with my stethoscope moderately it did not seem to cause him discomfort.     Musculoskeletal:      Right lower leg: No edema.      Left lower leg: No edema.      Comments: Possible trace pedal edema   Skin:     General: Skin is warm and dry.   Neurological:      General: No focal deficit present.      Mental Status: He is alert.   Psychiatric:      Comments: " "Patient was relatively quiet but his interaction was unremarkable.            Medications   amLODIPine, 5 mg, oral, Daily  bisacodyl, 10 mg, rectal, Daily  tiotropium, 2 Inhalation, inhalation, Daily   And  formoterol, 20 mcg, nebulization, q12h  heparin (porcine), 5,000 Units, subcutaneous, q8h  LORazepam, 0.5 mg, intravenous, Once  pantoprazole, 40 mg, oral, BID AC  polyethylene glycol, 17 g, oral, Daily  sennosides, 1 tablet, oral, BID  tamsulosin, 0.4 mg, oral, Nightly       PRN medications: acetaminophen, HYDROmorphone, hydrOXYzine HCL, ipratropium-albuteroL, ondansetron, polyethylene glycol       Labs     All new labs reviewed:  some of the basic labs as follows -     Results from last 7 days   Lab Units 11/22/23  0742 11/21/23  0657 11/20/23  0843 11/19/23  0636 11/18/23  1408   WBC AUTO x10*3/uL 9.7 9.8 11.2 10.5 11.4*   HEMOGLOBIN g/dL 8.5* 8.7* 8.6* 9.0* 9.1*   HEMATOCRIT % 30.4* 31.9* 31.1* 31.9* 32.2*   PLATELETS AUTO x10*3/uL 628* 629* 672* 677* 614*   NEUTROS PCT AUTO %  --   --  80.4 85.2 87.6   LYMPHO PCT MAN % 9.6 2.6  --   --   --    LYMPHS PCT AUTO %  --   --  11.5 7.8 5.7   MONO PCT MAN % 1.7 2.6  --   --   --    MONOS PCT AUTO %  --   --  5.6 4.9 4.8   EOSINO PCT MAN % 2.6 1.8  --   --   --    EOS PCT AUTO %  --   --  1.2 0.9 1.1            Results from last 72 hours   Lab Units 11/22/23  0742 11/21/23  0657 11/20/23  0843   SODIUM mmol/L 139 138 139   POTASSIUM mmol/L 4.4 4.5 4.5   CHLORIDE mmol/L 106 106 105   CO2 mmol/L 26 24 26   BUN mg/dL 25* 21 20   CREATININE mg/dL 1.76* 1.67* 1.76*       Results from last 72 hours   Lab Units 11/22/23  0742 11/21/23  0657 11/20/23  0843   ALBUMIN g/dL 2.6* 2.9* 2.7*       Results from last 72 hours   Lab Units 11/22/23  0742 11/21/23  0657 11/20/23  0843   GLUCOSE mg/dL 80 78 80           No results found for: \"TR1\"  Lab Results   Component Value Date    URINECULTURE No significant growth 11/17/2023            Imaging   MR brain w and wo IV " contrast  Narrative: Interpreted By:  Faizan Vickers,   STUDY:  MR BRAIN W AND WO IV CONTRAST;  11/21/2023 12:16 pm      INDICATION:  Signs/Symptoms:staging.      COMPARISON:  Incomplete motion degraded study dated 11/19/2023      ACCESSION NUMBER(S):  [id number]      ORDERING CLINICIAN:  PARAG CANALES      TECHNIQUE:  Only motion degraded axial diffusion, axial T2, axial FLAIR, axial  gradient echo T2, and axial T1 weighted MRI images of the brain  without gadolinium could be obtained. Per the MRI technologist notes,  the patient began climbing out of the scanner and refused to continue  with the study following the administration of gadolinium. No post  gadolinium MRI imaging could be obtained. The patient received 14 mL  of Dotarem gadolinium intravenously.      FINDINGS:  The study is incomplete with the obtained images limited by motion.  No post gadolinium MRI imaging could be obtained.      Within the limitations of the study, no definite abnormal diffusion  restriction to suggest acute infarction is noted.      There is again evidence of moderate brain parenchymal volume loss  similar when compared with the prior study dated 11/19/2023.      Nonspecific white matter changes are noted within the cerebral  hemispheres bilaterally as well as ill-defined increased signal on  the FLAIR and T2 images overlying the brainstem which while  nonspecific, given the patient's age, likely represent sequelae of  more remote small-vessel ischemic change.      There is no midline shift. The suprasellar/basilar cisterns are  patent.      There is mild mucosal thickening within the paranasal sinuses.          Impression: The study is incomplete with the obtained images limited by motion.  No post gadolinium MRI imaging could be obtained.      Within the limitations of the study, there is again evidence of  moderate brain parenchymal volume loss similar when compared with the  prior study dated 11/19/2023.      Nonspecific  white matter changes are noted within the cerebral  hemispheres bilaterally as well as ill-defined increased signal on  the FLAIR and T2 images overlying the brainstem which while  nonspecific, given the patient's age, likely represent sequelae of  more remote small-vessel ischemic change.      MACRO:  None.      Signed by: Faizan Vickers 11/21/2023 2:35 PM  Dictation workstation:   [id number]         Assessment and Plan     Squamous cell lung cancer: Bone scan planned and repeat MRI limited without any obvious brain mets.  As noted above patient is clear that he would want treatment for his cancer for his wellbeing even if it was not curative  -Follow-up radiation oncology and med oncology recommendations.  May need to have anesthesia assist with MRI to complete satisfactory imaging  -No plan for stenting open airways as left lower lobe deemed nonsalvageable by pulmonary  -No need for anticoagulation of tumor thrombus per vascular medicine recommendations.  They do recommend standard DVT prophylaxis  -Per discussions with the radiation oncology service and primary team plan is to obtain PET scan and repeat MRI as noted possibly with the aid of anesthesia    Hematemesis versus hemoptysis: Hemoglobin stable without any reports of gross blood loss  -Appreciate GI input.  If patient has signs of massive GI bleed will alert on-call GI service  -Continue PPI twice daily  -Monitoring hemoglobin  -Should patient have massive hemoptysis we will utilize inhaled TXA    COPD:  -Continue inhalers/nebs  -Incentive spirometer every hour while awake doing approximately 10 times per hour    Suspected constipation:  -Bowel care.      Cardiovascular: Blood pressure overall at goal especially given his overall prognosis and age  -Currently holding home amlodipine.      Acute renal failure: Creatinine appears near baseline now  -Trend renal function panel  -Avoid nephrotoxic age    BPH:  -Continue Flomax    DVT  prophylaxis    Physical therapy/Occupational Therapy when appropriate    Yousif Mitchell MD     Of note the above was done with Dragon dictation system.  Note was proofread to minimize errors.

## 2023-11-22 NOTE — PROGRESS NOTES
Occupational Therapy    Occupational Therapy    Evaluation    Patient Name: Noel Aquino  MRN: [id number]  Today's Date: 11/22/2023  Time Calculation  Start Time: 1240  Stop Time: 1257  Time Calculation (min): 17 min    Assessment  IP OT Assessment  OT Assessment: Patient with intermittent agitation during OT eval with questions asked and wanting food. Unable to complete cognitive assessment due to pt declining to answer after 1/2 of questions completed. Patient declining OOB activity and prefering to stay at EOB. Recommend continued OT services for increased independence and safety.  Prognosis: Fair  Barriers to Discharge: Decreased caregiver support  Medical Staff Made Aware: Yes  End of Session Communication: Bedside nurse  End of Session Patient Position:  (seated EOB; RN reporting ok for alarm off)  Plan:  Treatment Interventions: ADL retraining, Functional transfer training, UE strengthening/ROM, Endurance training, Cognitive reorientation, Patient/family training, Fine motor coordination activities, Compensatory technique education  OT Frequency: 2 times per week  OT Discharge Recommendations: Moderate intensity level of continued care  OT Recommended Transfer Status: Assist of 1  OT - OK to Discharge: Yes (when medically appropriate)    Subjective   Current Problem:  1. Anemia        2. Hematemesis with nausea        3. Thrombosis          General:  Reason for Referral: hematemesis and abdominal pain  Past Medical History Relevant to Rehab: L upper lobe mass, PE, COPD, CKD  Prior to Session Communication: Bedside nurse  Patient Position Received: Alarm off, not on at start of session (seated at EOB)  Family/Caregiver Present: No   Precautions:  Medical Precautions: Fall precautions       Pain:  Pain Assessment  Pain Assessment: 0-10  Pain Score: 0 - No pain           Objective   Cognition:  Overall Cognitive Status: Impaired  Orientation Level: Disoriented to time, Disoriented to situation (unsure of month  or current season; able to identify fall with clues provided. Thought it was 1923. Wrote date in larger print and pt reporting he could see it.)  Insight: Moderate  Processing Speed: Delayed           Home Living:  Type of Home: House  Lives With: Alone  Home Adaptive Equipment: Cane  Home Living Comments: Pt. reporting use of cane.   Prior Function:  Level of [place]: Independent with ADLs and functional transfers (questionable historian)  Prior Function Comments: Pt. unable to identify who he could call for assistance.       ADL:  Eating Assistance: Modified independent (Device)  Eating Deficit: Setup  Grooming Assistance: Stand by  Grooming Deficit: Supervision/safety  Bathing Assistance: Minimal  UE Dressing Assistance: Stand by  UE Dressing Deficit: Supervision/safety  LE Dressing Assistance: Minimal  LE Dressing Deficit: Supervision/safety  Toileting Assistance with Device: Minimal  Toileting Deficit: Steadying       Bed Mobility/Transfers: Bed Mobility  Bed Mobility: No (seated at EOB throughout session)   and Transfers  Transfer:  (pt declined to stand)       Vision: Vision - Basic Assessment  Patient Visual Report:  (Reporting difficulty with vision. Pt. irritable with further questions and declined to elaborate.)       Strength:  Strength Comments: per self report; WFL        Hand Function:  Hand Function  Gross Grasp: Functional  Coordination: Functional  Extremities: RUE   RUE : Within Functional Limits, LUE   LUE: Within Functional Limits,  , and      Outcome Measures: [facility] Daily Activity  Putting on and taking off regular lower body clothing: A little  Bathing (including washing, rinsing, drying): A little  Putting on and taking off regular upper body clothing: A little  Toileting, which includes using toilet, bedpan or urinal: A little  Taking care of personal grooming such as brushing teeth: A little  Eating Meals: None  Daily Activity - Total Score: 19         ,     OT Adult Other Outcome  "Measures  Short Blessed Test (SBT): year (1923), no month or time of day identified, sequencing 20-15 only (reporting \"this is stupid\"), delayed recall (Horace Scruggs, no others), declining all further questions.    Education Documentation  Handouts, taught by Neida Schilling OT at 11/22/2023  1:52 PM.  Learner: Patient  Readiness: Acceptance  Method: Explanation  Response: Verbalizes Understanding, Needs Reinforcement    Precautions, taught by Neida Schilling OT at 11/22/2023  1:52 PM.  Learner: Patient  Readiness: Acceptance  Method: Explanation  Response: Verbalizes Understanding, Needs Reinforcement    ADL Training, taught by Neida Schilling OT at 11/22/2023  1:52 PM.  Learner: Patient  Readiness: Acceptance  Method: Explanation  Response: Verbalizes Understanding, Needs Reinforcement    Education Comments  No comments found.        Goals:   Encounter Problems       Encounter Problems (Active)       ADLs       Patient will complete dressing with MOD I.  (Progressing)       Start:  11/22/23    Expected End:  12/06/23            Patient will complete toileting with MOD I.  (Progressing)       Start:  11/22/23    Expected End:  12/06/23                       COGNITION/SAFETY       Patient will be A&Ox3 using external memory strategies as needed. (Progressing)       Start:  11/22/23    Expected End:  12/06/23               MOBILITY       Pt. Will demo household distance functional mobility with MOD I using LRAD.   (Progressing)       Start:  11/22/23    Expected End:  12/06/23                 TRANSFERS       Pt. Will complete stand pivot transfer with MOD I using LRAD.   (Progressing)       Start:  11/22/23    Expected End:  12/06/23 11/22/23 at 1:53 PM   Neida Schilling OT       "

## 2023-11-22 NOTE — CARE PLAN
The patient's goals for the shift include      The clinical goals for the shift include Will be cooperative with care during shift.

## 2023-11-22 NOTE — SIGNIFICANT EVENT
"Capacity Evaluation 11/22/2023, 15:00    Multiple conversations were had between the patient and primary team (and multiple consulting groups) regarding his diagnosis of lung cancer in 8/2023 which was lost to outpatient follow-up. We discussed rationale for staging his cancer in service of determining eligibility for curative chemo/XRT vs. palliative radiation multiple times this admission. However, multiple concerns were raised by the team regarding the patient's understanding of these conversations, capacity to consent to treatment, and ability to follow-up as an outpatient, especially as he has refused to cooperate with social work in providing any contacts or family    1.) Understanding of medical problem  - Why are you currently in the hospital \"I came into the hospital because my stomach was hurting\"  - Do you have any other issues? \"No sir\"  - Are you aware that you have a lung cancer? \"Yes sir\"  - Is that an issue for you? \"Yes it is but I don't have an idea of what to do about it\"  - Why is that an issue for you? \"I haven't a f*cking clue, for my wellness or something, stop asking all these stupid f*cking questions\"  - Are you aware of why we are doing all these tests? \"For my own good, I don't know\"  - What do you mean by own good, what do you hope to get better from? \"I have no idea how to answer that question doc, please leave me alone and stop asking all these stupid f*cking questions\"    2.) Understanding of proposed treatment (which has been explained multiple times this admission)   - We've talked about options for treatment a few times now, are you able to tell me what the treatment is? \"I don't know doc, you keep asking all these stupid questions that I just don't know, please just leave.\"    3.) Understanding benefits and consequences to treatment   - (After discussing options for treatment based on workup again) Do you want to be treated for your cancer whether or not it would cure your disease? " "\"Yes I would\" - consistent with previous conversations  - Can you tell me why you would want to be treated for cancer? \"For my wellbeing\"  - What does well-being mean for you in this case? \"You asked me this before I don't know how to answer all these questions\"  - Are you able to forsee any issues if we do not treat your lung cancer? \"I don't know how to answer that question. I just don't know, I'll do whatever just leave me alone\"     Though there is suspicion that patient may have impairment in capacity, he does remain consistent in his desire to be treatment as well as unwillingness to participate in interview. As such, I was unable to complete my assessment and the patient should be defaulted as having capacity.     Brian Barnett MD  PGY-1, Resident     "

## 2023-11-22 NOTE — CONSULTS
Reason For Consult  Patient without Proxy and Plan of Care    History Of Present Illness  Noel Aquino is a 78 y.o. male presenting with hematemesis and chronic abdominal pain. 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     Past Medical History  His medical history is significant for a left upper lobe mass diagnosed 08/23 as squamous cell carcinoma, PE, COPD, chronic kidney disease, duodenal AV malformation, GI bleed, JOSÉ MIGUEL, hypertension, BPH, and polysubstance use disorder.     Surgical History  He has a past surgical history that includes CT angio abdomen pelvis w and or wo IV IV contrast (11/17/2023).     Social History  He reports that he has quit smoking. His smoking use included cigarettes. He has never used smokeless tobacco. No history on file for alcohol use and drug use.    This admission has been complicated by the patient lack of insight into his condition and plan of care. Also he was been unable or unwilling to participate in discussions about his care, follow the recommended plan of care and completed a MRI but it was not optimal because of poor tolerance.    From review of the medical record and discussion with the team, Mr. Aquino has been either unable or unwilling to cooperate in discussions about his care and to determine whether he has any social support. A review of the record has not produced contact information for any familial contacts or friends who could assist in Mr. Aquino's care.     Family History  No family history on file.        Assessment/Plan     A formal capacity assessment should be completed and if it is determined that Mr. Aquino lacks the capacity to make his own medical decisions and a surrogate decision maker cannot be identified for him, then he is a patient without proxy. If these determinations are made, ethics should be paged when medical decisions need to be made on his behalf.    Ethics  will continue to follow.        Hallie Barker JD

## 2023-11-22 NOTE — PROGRESS NOTES
Noel Aquino is a 78 y.o. male on day 5 of admission presenting with Anemia.    Subjective   Patient still requires staging studies to consider any therapy but failed MRI brain twice. He is seen at the bedside this morning at which time he has no new complaints and states he would like to be treated for his cancer with whatever would be indicated. Denies further hemoptysis or hematemesis, no melena or hematochezia noted.    Objective   Weight: 70.3 kg (155 lb) (11/17/23 0333)    Daily Weight  11/17/23 : 70.3 kg (155 lb)    Last Recorded Vitals  Heart Rate:  []   Temp:  [36.8 °C (98.2 °F)-37.9 °C (100.2 °F)]   Resp:  [12-22]   BP: (110-143)/(66-82)   SpO2:  [96 %-99 %]      Constitutional:       General: He is not in acute distress. Laying in bed on his left side.     Appearance: Non-toxic but chronically ill appearing.  HENT:      Head: Normocephalic and atraumatic.   Eyes:      Extraocular Movements: Extraocular movements intact.      Pupils: Pupils are equal, round, and reactive to light.   Cardiovascular:      Rate and Rhythm: Normal rate. Rhythm irregular.      Pulses: Normal pulses.      Heart sounds: Murmur heard.      No friction rub. No gallop.   Pulmonary:      Effort: Pulmonary effort is normal. No respiratory distress.      Breath sounds: Upper airway wheezing with distant breath sounds   Abdominal:      General: Abdomen is flat.      Palpations: Abdomen is soft.      Tenderness: There is abdominal tenderness.      Comments: Diffusely tender but at baseline per patient, refuses further abdominal exams  Musculoskeletal:         General: No swelling or deformity.   Skin:     General: Skin is warm and dry.   Neurological:      Mental Status: He is alert. Odd affect     Intake/Output last 3 Shifts:  I/O last 3 completed shifts:  In: - (0 mL/kg)   Out: 1500 (21.3 mL/kg) [Urine:1500 (0.6 mL/kg/hr)]  Weight: 70.3 kg     Medications  Scheduled medications  amLODIPine, 5 mg, oral, Daily  bisacodyl, 10 mg,  rectal, Daily  tiotropium, 2 Inhalation, inhalation, Daily   And  formoterol, 20 mcg, nebulization, q12h  heparin (porcine), 5,000 Units, subcutaneous, q8h  LORazepam, 0.5 mg, intravenous, Once  pantoprazole, 40 mg, oral, BID AC  polyethylene glycol, 17 g, oral, Daily  sennosides, 1 tablet, oral, BID  tamsulosin, 0.4 mg, oral, Nightly      Continuous medications     PRN medications  PRN medications: acetaminophen, HYDROmorphone, hydrOXYzine HCL, ipratropium-albuteroL, ondansetron, polyethylene glycol     Relevant Results    Labs  Lab Results   Component Value Date    WBC 9.7 11/22/2023    HGB 8.5 (L) 11/22/2023    HCT 30.4 (L) 11/22/2023    MCV 67 (L) 11/22/2023     (H) 11/22/2023      Results from last 7 days   Lab Units 11/22/23  0742 11/21/23  0657 11/20/23  0843   SODIUM mmol/L 139 138 139   POTASSIUM mmol/L 4.4 4.5 4.5   CHLORIDE mmol/L 106 106 105   CO2 mmol/L 26 24 26   BUN mg/dL 25* 21 20   CREATININE mg/dL 1.76* 1.67* 1.76*   CALCIUM mg/dL 9.7 9.8 9.6      Results from last 7 days   Lab Units 11/17/23  0406   ALK PHOS U/L 35   BILIRUBIN TOTAL mg/dL 0.4   PROTEIN TOTAL g/dL 7.1   ALT U/L 4*   AST U/L 20             Imaging  No new imaging     Assessment/Plan   Principal Problem:    Anemia    79 y/o M with PMHx of left upper lobe mass, 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 He was HDS though vitals were significant for tachycardia to 132 and labs showed Hb 5.9 on presentation which improved/incremented more than appropriately to 9 after 2u pRBC. He was also started on ceftriaxone. He has elevated Cr to 2.23 from his BL ~2, with BUN elevated to 29. CT angio AP was limited due to diffuse edema and abdominopelvic fluid but showed filling defect of the left pulmonary vein concerning for luminal thrombus though showed  no active extravasation within GI tract. CT angio Chest showed interval increase in size of MESHA mass with mass effect on left main pulmonary artery, left interlobar artery, obliteration of left superior pulmonary vein, rounded hypodense filling defect consisted with tumor thrombus. Pulmonology and GI were consulted, with no place for acute intervention. Oncology was consulted and did not feel there was need for inpatient evaluation. Radiation Oncology was consulted and recommend MRI brain and bone scan for staging.     11/22 Updates   - NM bone scan negative   - Attempted MRI x2, not tolerated d/t patient moving   - No indication for inpatient PET per CMO, attempting to schedule MRI brain with anesthesia   - Pending capacity evaluation today     #Poor Insight  - Ethics consulted - to assist with decision making should patient be found to lack capacity   - SW consulted - refused to provide patient contacts   - Pending primary team capacity eval      #Suspected Hemoptysis vs. 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   - Gastroenterology consulted: IV PPI BIDx14 -> PO PPI BIDx14 (-12/3) -> PO PPD daily, not good candidate for sedation/EGD iso below, will call if having signs of massive GI bleed  - Pulmonology consulted: unlikely hemoptysis, nebulized TXA  as temporizing measure for hemoptysis  - daily CBC  - goal Hgb>7 (has been stable since initial transfusions)     #MESHA mass, SCC, with mass effect  #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 Martinez  - Vascular medicine consulted, tumor thrombus is intraluminal extension of tumor, continue SQH  - RadOnc consulted, staging studies needed to plan treatment, needs MRI Brain and PET Scan      #Sinus tachycardia, with PACs, improved   ::Suspect hypovolemia as underlying etiology, improved this hospitalization s/p IVF   - EKG showing sinus tachycardia with respiratory variation c/w sinus arrhythmia and frequent PACs   - CTM, get EKGs as needed re-assess volume status as needed    #Abdominal Pain, unclear etiology   #Constipation   ::CT angio AP: diffuse edema and abdominopelvic fluid, moderate stool burden  - received 2 days of ceftriaxone - will hold and monitor  - Tolerating regular diet   - Bowel Regimen (Miralax/Sennosides), will try Bisocodyl daily as well      #Pyuria   ::UA 11/17 appeared dirty, Ucx NGTD  - asymptomatic, hold ceftriaxone and monitor as above     #COPD  - Continue Stiolto  - Duonebs Q4H prn     #HTN  - c/w home Amlodipine 5     #BPH   - c/w home Tamsulosin      F : as needed  E: replete lytes prn,  N: Full Diet     DVT prophylaxis: heparin sq  GI prophylaxis: IV PPI BID as per GI recs     Code Status: Full (discussed with patient at bedside upon admission)

## 2023-11-23 PROCEDURE — 1200000002 HC GENERAL ROOM WITH TELEMETRY DAILY

## 2023-11-23 PROCEDURE — 2500000004 HC RX 250 GENERAL PHARMACY W/ HCPCS (ALT 636 FOR OP/ED)

## 2023-11-23 PROCEDURE — 2500000001 HC RX 250 WO HCPCS SELF ADMINISTERED DRUGS (ALT 637 FOR MEDICARE OP)

## 2023-11-23 PROCEDURE — 99232 SBSQ HOSP IP/OBS MODERATE 35: CPT | Performed by: INTERNAL MEDICINE

## 2023-11-23 PROCEDURE — 94640 AIRWAY INHALATION TREATMENT: CPT

## 2023-11-23 PROCEDURE — 2500000002 HC RX 250 W HCPCS SELF ADMINISTERED DRUGS (ALT 637 FOR MEDICARE OP, ALT 636 FOR OP/ED)

## 2023-11-23 RX ADMIN — FORMOTEROL FUMARATE DIHYDRATE 20 MCG: 20 SOLUTION RESPIRATORY (INHALATION) at 21:13

## 2023-11-23 RX ADMIN — AMLODIPINE BESYLATE 5 MG: 5 TABLET ORAL at 09:16

## 2023-11-23 RX ADMIN — PANTOPRAZOLE SODIUM 40 MG: 40 TABLET, DELAYED RELEASE ORAL at 09:16

## 2023-11-23 ASSESSMENT — COGNITIVE AND FUNCTIONAL STATUS - GENERAL
STANDING UP FROM CHAIR USING ARMS: A LITTLE
CLIMB 3 TO 5 STEPS WITH RAILING: TOTAL
DAILY ACTIVITIY SCORE: 19
WALKING IN HOSPITAL ROOM: TOTAL
DRESSING REGULAR UPPER BODY CLOTHING: A LITTLE
TURNING FROM BACK TO SIDE WHILE IN FLAT BAD: A LITTLE
MOVING FROM LYING ON BACK TO SITTING ON SIDE OF FLAT BED WITH BEDRAILS: A LITTLE
MOVING TO AND FROM BED TO CHAIR: A LOT
MOBILITY SCORE: 13
DRESSING REGULAR LOWER BODY CLOTHING: A LITTLE
PERSONAL GROOMING: A LITTLE
TOILETING: A LITTLE
HELP NEEDED FOR BATHING: A LITTLE

## 2023-11-23 ASSESSMENT — PAIN - FUNCTIONAL ASSESSMENT: PAIN_FUNCTIONAL_ASSESSMENT: 0-10

## 2023-11-23 ASSESSMENT — PAIN SCALES - GENERAL: PAINLEVEL_OUTOF10: 0 - NO PAIN

## 2023-11-23 NOTE — PROGRESS NOTES
Subjective   Noel Aquino is a 78 y.o. male on hospital day 6 reports he is doing well.  He denies complaints.      Objective     Exam     Vitals:    11/22/23 2047 11/22/23 2318 11/23/23 0829 11/23/23 1150   BP:  112/65 109/58 111/64   Pulse:  80 85 87   Resp:  18 20    Temp:  37.4 °C (99.3 °F) 36.6 °C (97.9 °F) (!) 6 °C (42.8 °F)   TempSrc:       SpO2: 100% 97% 99% 97%   Weight:       Height:          Intake/Output last 3 shifts:  I/O last 3 completed shifts:  In: 480 (6.8 mL/kg) [P.O.:480]  Out: 1200 (17.1 mL/kg) [Urine:1200 (0.5 mL/kg/hr)]  Weight: 70.3 kg     Physical Exam  Vitals reviewed.   Constitutional:       General: He is not in acute distress.  HENT:      Head: Normocephalic and atraumatic.      Mouth/Throat:      Mouth: Mucous membranes are moist.   Cardiovascular:      Rate and Rhythm: Normal rate and regular rhythm.      Pulses: Normal pulses.      Heart sounds: Murmur (2 out of 6 greatest at the right upper sternal border) heard.      No friction rub. No gallop.   Pulmonary:      Breath sounds: No wheezing.      Comments: Patient with some upper airway noises.  Somewhat diminished at left base  Abdominal:      General: Bowel sounds are normal. There is no distension.      Comments: Did not appear tender using stethoscope to palpate while I auscultated   Musculoskeletal:      Right lower leg: No edema.      Left lower leg: No edema.   Skin:     General: Skin is warm and dry.   Neurological:      General: No focal deficit present.      Mental Status: He is alert.   Psychiatric:      Comments: Patient was relatively quiet but his interaction was unremarkable.            Medications   amLODIPine, 5 mg, oral, Daily  bisacodyl, 10 mg, rectal, Daily  tiotropium, 2 Inhalation, inhalation, Daily   And  formoterol, 20 mcg, nebulization, q12h  heparin (porcine), 5,000 Units, subcutaneous, q8h  LORazepam, 0.5 mg, intravenous, Once  pantoprazole, 40 mg, oral, BID AC  polyethylene glycol, 17 g, oral,  "Daily  sennosides, 1 tablet, oral, BID  tamsulosin, 0.4 mg, oral, Nightly       PRN medications: acetaminophen, HYDROmorphone, hydrOXYzine HCL, ipratropium-albuteroL, ondansetron, polyethylene glycol       Labs     All new labs reviewed:  some of the basic labs as follows -     Results from last 7 days   Lab Units 11/22/23  0742 11/21/23  0657 11/20/23  0843 11/19/23  0636 11/18/23  1408   WBC AUTO x10*3/uL 9.7 9.8 11.2 10.5 11.4*   HEMOGLOBIN g/dL 8.5* 8.7* 8.6* 9.0* 9.1*   HEMATOCRIT % 30.4* 31.9* 31.1* 31.9* 32.2*   PLATELETS AUTO x10*3/uL 628* 629* 672* 677* 614*   NEUTROS PCT AUTO %  --   --  80.4 85.2 87.6   LYMPHO PCT MAN % 9.6 2.6  --   --   --    LYMPHS PCT AUTO %  --   --  11.5 7.8 5.7   MONO PCT MAN % 1.7 2.6  --   --   --    MONOS PCT AUTO %  --   --  5.6 4.9 4.8   EOSINO PCT MAN % 2.6 1.8  --   --   --    EOS PCT AUTO %  --   --  1.2 0.9 1.1            Results from last 72 hours   Lab Units 11/22/23  0742 11/21/23  0657   SODIUM mmol/L 139 138   POTASSIUM mmol/L 4.4 4.5   CHLORIDE mmol/L 106 106   CO2 mmol/L 26 24   BUN mg/dL 25* 21   CREATININE mg/dL 1.76* 1.67*       Results from last 72 hours   Lab Units 11/22/23  0742 11/21/23  0657   ALBUMIN g/dL 2.6* 2.9*       Results from last 72 hours   Lab Units 11/22/23  0742 11/21/23  0657   GLUCOSE mg/dL 80 78           No results found for: \"TR1\"  Lab Results   Component Value Date    URINECULTURE No significant growth 11/17/2023            Imaging   MR brain w and wo IV contrast  Narrative: Interpreted By:  Faizan Vickers,   STUDY:  MR BRAIN W AND WO IV CONTRAST;  11/21/2023 12:16 pm      INDICATION:  Signs/Symptoms:staging.      COMPARISON:  Incomplete motion degraded study dated 11/19/2023      ACCESSION NUMBER(S):  [id number]      ORDERING CLINICIAN:  PARAG CANALES      TECHNIQUE:  Only motion degraded axial diffusion, axial T2, axial FLAIR, axial  gradient echo T2, and axial T1 weighted MRI images of the brain  without gadolinium could be obtained. " Per the MRI technologist notes,  the patient began climbing out of the scanner and refused to continue  with the study following the administration of gadolinium. No post  gadolinium MRI imaging could be obtained. The patient received 14 mL  of Dotarem gadolinium intravenously.      FINDINGS:  The study is incomplete with the obtained images limited by motion.  No post gadolinium MRI imaging could be obtained.      Within the limitations of the study, no definite abnormal diffusion  restriction to suggest acute infarction is noted.      There is again evidence of moderate brain parenchymal volume loss  similar when compared with the prior study dated 11/19/2023.      Nonspecific white matter changes are noted within the cerebral  hemispheres bilaterally as well as ill-defined increased signal on  the FLAIR and T2 images overlying the brainstem which while  nonspecific, given the patient's age, likely represent sequelae of  more remote small-vessel ischemic change.      There is no midline shift. The suprasellar/basilar cisterns are  patent.      There is mild mucosal thickening within the paranasal sinuses.          Impression: The study is incomplete with the obtained images limited by motion.  No post gadolinium MRI imaging could be obtained.      Within the limitations of the study, there is again evidence of  moderate brain parenchymal volume loss similar when compared with the  prior study dated 11/19/2023.      Nonspecific white matter changes are noted within the cerebral  hemispheres bilaterally as well as ill-defined increased signal on  the FLAIR and T2 images overlying the brainstem which while  nonspecific, given the patient's age, likely represent sequelae of  more remote small-vessel ischemic change.      MACRO:  None.      Signed by: Faizan Vickers 11/21/2023 2:35 PM  Dictation workstation:   [id number]         Assessment and Plan     Squamous cell lung cancer: Bone scan planned and repeat MRI  limited without any obvious brain mets.  As noted above patient is clear that he would want treatment for his cancer for his wellbeing even if it was not curative  -Will need to have anesthesia assist with MRI to complete satisfactory imaging if he is acceptable candidate for this as he was was deemed too high of risk for anesthesia for EGD given his lung cancer findings and collapsed left side and plan for outpatient PET scanning  -No plan for stenting open airways as left lower lobe deemed nonsalvageable by pulmonary  -No need for anticoagulation of tumor thrombus per vascular medicine recommendations.  They do recommend standard DVT prophylaxis  -Arrange for outpatient radiation oncology and medical oncology follow-up    Hematemesis versus hemoptysis: Hemoglobin stable without any reports of gross blood loss  -Appreciate GI input.  If patient has signs of massive GI bleed will alert on-call GI service  -Continue PPI twice daily  -Monitoring hemoglobin  -Should patient have massive hemoptysis we will utilize inhaled TXA    COPD:  -Continue inhalers/nebs  -Incentive spirometer every hour while awake doing approximately 10 times per hour    GI:  -Bowel care.      Cardiovascular: Blood pressure overall at goal especially given his overall prognosis and age  -Currently holding home amlodipine.      Acute renal failure: Creatinine appears near baseline now  -Trend renal function panel  -Avoid nephrotoxic age    BPH:  -Continue Flomax    DVT prophylaxis    Physical therapy/Occupational Therapy.  Patient is actually agreeable to SNF placement at this time.  Will ask care coordinator to help assist arrangement    Yousif Mitchell MD     Of note the above was done with Dragon dictation system.  Note was proofread to minimize errors.

## 2023-11-23 NOTE — NURSING NOTE
End of Shift Note:    Pt. Condition unchanged during shift. Pt. Refused to work with PT. VS stable, O2 96% on RA. MRI with anesthesia ordered. Capacity evaluation was done and pt. Was determined to have capacity although RN staff disagree with evaluation of capacity based on pt. Refusal of tx, lack of understanding or ability to verbalize understanding of tx, and refusal to provide basic information such as living situation.  Pt. H&H remain stable.

## 2023-11-23 NOTE — CARE PLAN
The patient's goals for the shift include      The clinical goals for the shift include Will maintained safety during shift.    Patient asleep most of the shift and request for food constantly when awake.

## 2023-11-24 ENCOUNTER — APPOINTMENT (OUTPATIENT)
Dept: RADIOLOGY | Facility: HOSPITAL | Age: 78
DRG: 181 | End: 2023-11-24
Payer: MEDICARE

## 2023-11-24 ENCOUNTER — ANESTHESIA EVENT (OUTPATIENT)
Dept: RADIOLOGY | Facility: HOSPITAL | Age: 78
DRG: 181 | End: 2023-11-24
Payer: MEDICARE

## 2023-11-24 ENCOUNTER — ANESTHESIA (OUTPATIENT)
Dept: RADIOLOGY | Facility: HOSPITAL | Age: 78
DRG: 181 | End: 2023-11-24
Payer: MEDICARE

## 2023-11-24 PROCEDURE — 2550000001 HC RX 255 CONTRASTS

## 2023-11-24 PROCEDURE — A9575 INJ GADOTERATE MEGLUMI 0.1ML: HCPCS

## 2023-11-24 PROCEDURE — 2500000001 HC RX 250 WO HCPCS SELF ADMINISTERED DRUGS (ALT 637 FOR MEDICARE OP)

## 2023-11-24 PROCEDURE — 1200000002 HC GENERAL ROOM WITH TELEMETRY DAILY

## 2023-11-24 PROCEDURE — 99232 SBSQ HOSP IP/OBS MODERATE 35: CPT | Performed by: INTERNAL MEDICINE

## 2023-11-24 PROCEDURE — 70553 MRI BRAIN STEM W/O & W/DYE: CPT | Performed by: STUDENT IN AN ORGANIZED HEALTH CARE EDUCATION/TRAINING PROGRAM

## 2023-11-24 PROCEDURE — 2500000004 HC RX 250 GENERAL PHARMACY W/ HCPCS (ALT 636 FOR OP/ED)

## 2023-11-24 PROCEDURE — 70553 MRI BRAIN STEM W/O & W/DYE: CPT

## 2023-11-24 PROCEDURE — 2500000002 HC RX 250 W HCPCS SELF ADMINISTERED DRUGS (ALT 637 FOR MEDICARE OP, ALT 636 FOR OP/ED)

## 2023-11-24 PROCEDURE — 94640 AIRWAY INHALATION TREATMENT: CPT

## 2023-11-24 RX ORDER — MIDAZOLAM HYDROCHLORIDE 1 MG/ML
INJECTION INTRAMUSCULAR; INTRAVENOUS
Status: DISPENSED
Start: 2023-11-24 | End: 2023-11-24

## 2023-11-24 RX ORDER — PROPOFOL 10 MG/ML
INJECTION, EMULSION INTRAVENOUS
Status: DISPENSED
Start: 2023-11-24 | End: 2023-11-24

## 2023-11-24 RX ORDER — GADOTERATE MEGLUMINE 376.9 MG/ML
14 INJECTION INTRAVENOUS
Status: COMPLETED | OUTPATIENT
Start: 2023-11-24 | End: 2023-11-24

## 2023-11-24 RX ADMIN — FORMOTEROL FUMARATE DIHYDRATE 20 MCG: 20 SOLUTION RESPIRATORY (INHALATION) at 21:01

## 2023-11-24 RX ADMIN — PANTOPRAZOLE SODIUM 40 MG: 40 TABLET, DELAYED RELEASE ORAL at 08:49

## 2023-11-24 RX ADMIN — FORMOTEROL FUMARATE DIHYDRATE 20 MCG: 20 SOLUTION RESPIRATORY (INHALATION) at 09:03

## 2023-11-24 RX ADMIN — AMLODIPINE BESYLATE 5 MG: 5 TABLET ORAL at 08:48

## 2023-11-24 RX ADMIN — GADOTERATE MEGLUMINE 14 ML: 376.9 INJECTION INTRAVENOUS at 11:06

## 2023-11-24 ASSESSMENT — COGNITIVE AND FUNCTIONAL STATUS - GENERAL
DRESSING REGULAR UPPER BODY CLOTHING: A LITTLE
MOVING FROM LYING ON BACK TO SITTING ON SIDE OF FLAT BED WITH BEDRAILS: A LITTLE
STANDING UP FROM CHAIR USING ARMS: A LITTLE
DAILY ACTIVITIY SCORE: 19
DRESSING REGULAR LOWER BODY CLOTHING: A LITTLE
CLIMB 3 TO 5 STEPS WITH RAILING: A LOT
WALKING IN HOSPITAL ROOM: A LITTLE
TURNING FROM BACK TO SIDE WHILE IN FLAT BAD: A LITTLE
MOBILITY SCORE: 17
PERSONAL GROOMING: A LITTLE
HELP NEEDED FOR BATHING: A LITTLE
MOVING TO AND FROM BED TO CHAIR: A LITTLE
TOILETING: A LITTLE

## 2023-11-24 ASSESSMENT — PAIN SCALES - GENERAL
PAINLEVEL_OUTOF10: 0 - NO PAIN
PAINLEVEL_OUTOF10: 0 - NO PAIN

## 2023-11-24 ASSESSMENT — PAIN SCALES - WONG BAKER: WONGBAKER_NUMERICALRESPONSE: NO HURT

## 2023-11-24 ASSESSMENT — PAIN - FUNCTIONAL ASSESSMENT
PAIN_FUNCTIONAL_ASSESSMENT: 0-10
PAIN_FUNCTIONAL_ASSESSMENT: 0-10
PAIN_FUNCTIONAL_ASSESSMENT: WONG-BAKER FACES

## 2023-11-24 NOTE — PROGRESS NOTES
Subjective   Noel Aquino is a 78 y.o. male on hospital day 7 reports he is doing well.  He denies complaints.      Objective     Exam     Vitals:    11/24/23 0429 11/24/23 0745 11/24/23 0836 11/24/23 1413   BP: 125/82 126/57 (!) 118/95 139/72   BP Location: Left arm Right arm     Patient Position: Lying Lying     Pulse: 94 71  67   Resp: 18 20 18 14   Temp: 36.5 °C (97.7 °F) 36.4 °C (97.5 °F)  36.6 °C (97.9 °F)   TempSrc: Temporal Temporal     SpO2: 100% 100% 100% 100%   Weight:       Height:          Intake/Output last 3 shifts:  I/O last 3 completed shifts:  In: 480 (6.8 mL/kg) [P.O.:480]  Out: 2000 (28.4 mL/kg) [Urine:2000 (0.8 mL/kg/hr)]  Weight: 70.3 kg     Physical Exam  Vitals reviewed.   Constitutional:       General: He is not in acute distress.  HENT:      Head: Normocephalic and atraumatic.      Mouth/Throat:      Mouth: Mucous membranes are moist.   Cardiovascular:      Rate and Rhythm: Normal rate and regular rhythm.      Pulses: Normal pulses.      Heart sounds: Murmur (2 out of 6 greatest at the right upper sternal border) heard.      No friction rub. No gallop.   Pulmonary:      Breath sounds: No wheezing.      Comments: Patient with some upper airway noises.  Somewhat diminished at left base  Abdominal:      General: Bowel sounds are normal. There is no distension.      Comments: Did not appear tender using stethoscope to palpate while I auscultated   Musculoskeletal:      Right lower leg: No edema.      Left lower leg: No edema.   Skin:     General: Skin is warm and dry.   Neurological:      General: No focal deficit present.      Mental Status: He is alert.   Psychiatric:      Comments: Patient was relatively quiet but his interaction was unremarkable.            Medications   amLODIPine, 5 mg, oral, Daily  bisacodyl, 10 mg, rectal, Daily  tiotropium, 2 Inhalation, inhalation, Daily   And  formoterol, 20 mcg, nebulization, q12h  heparin (porcine), 5,000 Units, subcutaneous, q8h  LORazepam,  "0.5 mg, intravenous, Once  midazolam, , ,   pantoprazole, 40 mg, oral, BID AC  polyethylene glycol, 17 g, oral, Daily  propofol, , ,   sennosides, 1 tablet, oral, BID  tamsulosin, 0.4 mg, oral, Nightly       PRN medications: acetaminophen, HYDROmorphone, hydrOXYzine HCL, ipratropium-albuteroL, midazolam, ondansetron, polyethylene glycol, propofol       Labs     All new labs reviewed:  some of the basic labs as follows -     Results from last 7 days   Lab Units 11/22/23  0742 11/21/23  0657 11/20/23  0843 11/19/23  0636 11/18/23  1408   WBC AUTO x10*3/uL 9.7 9.8 11.2 10.5 11.4*   HEMOGLOBIN g/dL 8.5* 8.7* 8.6* 9.0* 9.1*   HEMATOCRIT % 30.4* 31.9* 31.1* 31.9* 32.2*   PLATELETS AUTO x10*3/uL 628* 629* 672* 677* 614*   NEUTROS PCT AUTO %  --   --  80.4 85.2 87.6   LYMPHO PCT MAN % 9.6 2.6  --   --   --    LYMPHS PCT AUTO %  --   --  11.5 7.8 5.7   MONO PCT MAN % 1.7 2.6  --   --   --    MONOS PCT AUTO %  --   --  5.6 4.9 4.8   EOSINO PCT MAN % 2.6 1.8  --   --   --    EOS PCT AUTO %  --   --  1.2 0.9 1.1            Results from last 72 hours   Lab Units 11/22/23  0742   SODIUM mmol/L 139   POTASSIUM mmol/L 4.4   CHLORIDE mmol/L 106   CO2 mmol/L 26   BUN mg/dL 25*   CREATININE mg/dL 1.76*       Results from last 72 hours   Lab Units 11/22/23  0742   ALBUMIN g/dL 2.6*       Results from last 72 hours   Lab Units 11/22/23  0742   GLUCOSE mg/dL 80           No results found for: \"TR1\"  Lab Results   Component Value Date    URINECULTURE No significant growth 11/17/2023            Imaging   MR brain w and wo IV contrast  Narrative: Interpreted By:  Wilfredo Bishop,   STUDY:  MR BRAIN W AND WO IV CONTRAST;  11/24/2023 11:29 am      INDICATION:  Signs/Symptoms: lung cancer staging.      COMPARISON:  Limited brain MRIs, 11/21/2023 and 11/19/2023 and head CT, 07/02/2023      ACCESSION NUMBER(S):  [id number]      ORDERING CLINICIAN:  PARAG CANALES      TECHNIQUE:  Axial T2, FLAIR, DWI, gradient echo T2 and T1 weighted images of " the  brain were acquired. Post contrast T1 weighted images were acquired  after administration of 14 mL Dotarem gadolinium based intravenous  contrast. Image quality is somewhat degraded by motion.      FINDINGS:  CSF Spaces: The ventricles, sulci and basal cisterns are appropriate  for the degree of volume loss. No abnormal extra-axial collection      Parenchyma: There is no restricted diffusion to suggest acute  infarction.  Generalized parenchymal volume loss is unchanged.  Nonspecific T2 hyperintense signal in the white matter is similar to  previous. Small foci of blooming on gradient echo images in the  medial left parietal and occipital lobes are similar to previous and  consistent with chronic hemosiderin deposition. No acute intracranial  hemorrhage. There is no mass effect or midline shift. Postcontrast  images are somewhat degraded by motion, however within this  limitation no abnormal enhancement is identified.      Paranasal Sinuses and Mastoids: Unremarkable.      Impression: No evidence of intracranial metastatic disease.      MACRO:  None      Signed by: Wilfredo Bishop 11/24/2023 11:48 AM  Dictation workstation:   [id number]         Assessment and Plan     Squamous cell lung cancer: Bone scan planned and repeat MRI with anesthesia shows no mets to the brain.  As noted above patient is clear that he would want treatment for his cancer for his wellbeing even if it was not curative.  Patient was very pleased to hear about the MRI results  -Will need to have anesthesia assist with MRI to complete satisfactory imaging if he is acceptable candidate for this as he was was deemed too high of risk for anesthesia for EGD given his lung cancer findings and collapsed left side and plan for outpatient PET scanning  -No plan for stenting open airways as left lower lobe deemed nonsalvageable by pulmonary  -No need for anticoagulation of tumor thrombus per vascular medicine recommendations.  They do recommend  standard DVT prophylaxis  -Arrange for outpatient radiation oncology and medical oncology follow-up    Hematemesis versus hemoptysis: Hemoglobin stable without any reports of gross blood loss  -Appreciate GI input.  If patient has signs of massive GI bleed will alert on-call GI service  -Continue PPI twice daily  -Monitoring hemoglobin  -Should patient have massive hemoptysis we will utilize inhaled TXA    COPD:  -Continue inhalers/nebs  -Incentive spirometer every hour while awake doing approximately 10 times per hour    GI:  -Bowel care.      Cardiovascular: Blood pressure overall at goal especially given his overall prognosis and age  -Currently holding home amlodipine.      Acute renal failure: Creatinine appears near baseline now  -Trend renal function panel  -Avoid nephrotoxic age    BPH:  -Continue Flomax    DVT prophylaxis    Physical therapy/Occupational Therapy.  Patient is actually agreeable to SNF placement at this time.  care coordinator to help assist arrangement.  At this time patient appears medically ready for discharge once skilled nursing facility bed is available and has clearance from insurance if needed    Yousif Mitchell MD     Of note the above was done with Dragon dictation system.  Note was proofread to minimize errors.

## 2023-11-24 NOTE — CARE PLAN
The clinical goals for the shift include patient will remain free from injury or falls during shift      Problem: Skin  Goal: Prevent/manage excess moisture  Outcome: Progressing  Goal: Prevent/minimize sheer/friction injuries  Outcome: Progressing  Flowsheets (Taken 11/23/2023 1950)  Prevent/minimize sheer/friction injuries: HOB 30 degrees or less  Goal: Promote/optimize nutrition  Outcome: Progressing  Goal: Promote skin healing  Outcome: Progressing     Problem: Safety - Adult  Goal: Free from fall injury  Outcome: Progressing

## 2023-11-24 NOTE — PROGRESS NOTES
11/24/23  @0103 Transitional Care Coordinator Note  I was notified by Dr. Phillips that pt would need SNF and he is medically ready. He also said the pt would get further onc work up outpatient. I called into the pt's room to see what choices he would like to use for SNF placement and to offer him a SNF list but the pt's phone was on DND. I also called the pt's cellphone but it went straight to . I did reach out to Clemencia CHRIS to see if she was onsite to provide the pt with a SNF list, she stated that she is remote as well but she will message his nurse to have the pt take his phone off of DND. Will continue to follow. Pranav Ross RN

## 2023-11-25 LAB
ALBUMIN SERPL BCP-MCNC: 2.5 G/DL (ref 3.4–5)
ANION GAP SERPL CALC-SCNC: 15 MMOL/L (ref 10–20)
BASOPHILS # BLD AUTO: 0.08 X10*3/UL (ref 0–0.1)
BASOPHILS NFR BLD AUTO: 0.7 %
BUN SERPL-MCNC: 28 MG/DL (ref 6–23)
CALCIUM SERPL-MCNC: 9.2 MG/DL (ref 8.6–10.6)
CHLORIDE SERPL-SCNC: 105 MMOL/L (ref 98–107)
CO2 SERPL-SCNC: 20 MMOL/L (ref 21–32)
CREAT SERPL-MCNC: 1.77 MG/DL (ref 0.5–1.3)
EOSINOPHIL # BLD AUTO: 0.09 X10*3/UL (ref 0–0.4)
EOSINOPHIL NFR BLD AUTO: 0.8 %
ERYTHROCYTE [DISTWIDTH] IN BLOOD BY AUTOMATED COUNT: 30.2 % (ref 11.5–14.5)
GFR SERPL CREATININE-BSD FRML MDRD: 39 ML/MIN/1.73M*2
GLUCOSE SERPL-MCNC: 115 MG/DL (ref 74–99)
HCT VFR BLD AUTO: 28.9 % (ref 41–52)
HGB BLD-MCNC: 7.9 G/DL (ref 13.5–17.5)
HYPOCHROMIA BLD QL SMEAR: NORMAL
IMM GRANULOCYTES # BLD AUTO: 0.07 X10*3/UL (ref 0–0.5)
IMM GRANULOCYTES NFR BLD AUTO: 0.6 % (ref 0–0.9)
LYMPHOCYTES # BLD AUTO: 1.16 X10*3/UL (ref 0.8–3)
LYMPHOCYTES NFR BLD AUTO: 9.8 %
MAGNESIUM SERPL-MCNC: 2.18 MG/DL (ref 1.6–2.4)
MCH RBC QN AUTO: 18.1 PG (ref 26–34)
MCHC RBC AUTO-ENTMCNC: 27.3 G/DL (ref 32–36)
MCV RBC AUTO: 66 FL (ref 80–100)
MONOCYTES # BLD AUTO: 0.63 X10*3/UL (ref 0.05–0.8)
MONOCYTES NFR BLD AUTO: 5.3 %
NEUTROPHILS # BLD AUTO: 9.79 X10*3/UL (ref 1.6–5.5)
NEUTROPHILS NFR BLD AUTO: 82.8 %
NRBC BLD-RTO: 0 /100 WBCS (ref 0–0)
PHOSPHATE SERPL-MCNC: 3 MG/DL (ref 2.5–4.9)
PLATELET # BLD AUTO: 615 X10*3/UL (ref 150–450)
POTASSIUM SERPL-SCNC: 4.5 MMOL/L (ref 3.5–5.3)
RBC # BLD AUTO: 4.36 X10*6/UL (ref 4.5–5.9)
RBC MORPH BLD: NORMAL
SODIUM SERPL-SCNC: 135 MMOL/L (ref 136–145)
T4 FREE SERPL-MCNC: 0.68 NG/DL (ref 0.78–1.48)
TARGETS BLD QL SMEAR: NORMAL
TSH SERPL-ACNC: 5.25 MIU/L (ref 0.44–3.98)
WBC # BLD AUTO: 11.8 X10*3/UL (ref 4.4–11.3)

## 2023-11-25 PROCEDURE — 1200000002 HC GENERAL ROOM WITH TELEMETRY DAILY

## 2023-11-25 PROCEDURE — 83735 ASSAY OF MAGNESIUM: CPT

## 2023-11-25 PROCEDURE — 84443 ASSAY THYROID STIM HORMONE: CPT

## 2023-11-25 PROCEDURE — 36415 COLL VENOUS BLD VENIPUNCTURE: CPT

## 2023-11-25 PROCEDURE — 96372 THER/PROPH/DIAG INJ SC/IM: CPT

## 2023-11-25 PROCEDURE — 99232 SBSQ HOSP IP/OBS MODERATE 35: CPT | Performed by: INTERNAL MEDICINE

## 2023-11-25 PROCEDURE — 80069 RENAL FUNCTION PANEL: CPT

## 2023-11-25 PROCEDURE — 2500000004 HC RX 250 GENERAL PHARMACY W/ HCPCS (ALT 636 FOR OP/ED)

## 2023-11-25 PROCEDURE — 94640 AIRWAY INHALATION TREATMENT: CPT

## 2023-11-25 PROCEDURE — 2500000002 HC RX 250 W HCPCS SELF ADMINISTERED DRUGS (ALT 637 FOR MEDICARE OP, ALT 636 FOR OP/ED)

## 2023-11-25 PROCEDURE — 84439 ASSAY OF FREE THYROXINE: CPT

## 2023-11-25 PROCEDURE — 85025 COMPLETE CBC W/AUTO DIFF WBC: CPT

## 2023-11-25 PROCEDURE — 2500000001 HC RX 250 WO HCPCS SELF ADMINISTERED DRUGS (ALT 637 FOR MEDICARE OP)

## 2023-11-25 RX ORDER — FERROUS SULFATE 325(65) MG
65 TABLET ORAL
Status: DISCONTINUED | OUTPATIENT
Start: 2023-11-26 | End: 2023-11-29 | Stop reason: HOSPADM

## 2023-11-25 RX ADMIN — POLYETHYLENE GLYCOL 3350 17 G: 17 POWDER, FOR SOLUTION ORAL at 08:35

## 2023-11-25 RX ADMIN — SENNOSIDES 8.6 MG: 8.6 TABLET, FILM COATED ORAL at 20:40

## 2023-11-25 RX ADMIN — HEPARIN SODIUM 5000 UNITS: 5000 INJECTION INTRAVENOUS; SUBCUTANEOUS at 17:24

## 2023-11-25 RX ADMIN — FORMOTEROL FUMARATE DIHYDRATE 20 MCG: 20 SOLUTION RESPIRATORY (INHALATION) at 10:24

## 2023-11-25 RX ADMIN — TAMSULOSIN HYDROCHLORIDE 0.4 MG: 0.4 CAPSULE ORAL at 20:40

## 2023-11-25 RX ADMIN — PANTOPRAZOLE SODIUM 40 MG: 40 TABLET, DELAYED RELEASE ORAL at 08:35

## 2023-11-25 RX ADMIN — PANTOPRAZOLE SODIUM 40 MG: 40 TABLET, DELAYED RELEASE ORAL at 17:24

## 2023-11-25 RX ADMIN — HEPARIN SODIUM 5000 UNITS: 5000 INJECTION INTRAVENOUS; SUBCUTANEOUS at 08:35

## 2023-11-25 RX ADMIN — AMLODIPINE BESYLATE 5 MG: 5 TABLET ORAL at 08:35

## 2023-11-25 RX ADMIN — FORMOTEROL FUMARATE DIHYDRATE 20 MCG: 20 SOLUTION RESPIRATORY (INHALATION) at 21:40

## 2023-11-25 RX ADMIN — SENNOSIDES 8.6 MG: 8.6 TABLET, FILM COATED ORAL at 08:35

## 2023-11-25 ASSESSMENT — COGNITIVE AND FUNCTIONAL STATUS - GENERAL
HELP NEEDED FOR BATHING: A LITTLE
MOBILITY SCORE: 24
DRESSING REGULAR LOWER BODY CLOTHING: A LITTLE
DRESSING REGULAR UPPER BODY CLOTHING: A LITTLE
DAILY ACTIVITIY SCORE: 21

## 2023-11-25 ASSESSMENT — PAIN SCALES - WONG BAKER: WONGBAKER_NUMERICALRESPONSE: NO HURT

## 2023-11-25 ASSESSMENT — PAIN - FUNCTIONAL ASSESSMENT: PAIN_FUNCTIONAL_ASSESSMENT: WONG-BAKER FACES

## 2023-11-25 ASSESSMENT — PAIN SCALES - GENERAL: PAINLEVEL_OUTOF10: 0 - NO PAIN

## 2023-11-25 NOTE — CARE PLAN
The clinical goals for the shift include patient will remain free from falls during shift      Problem: Skin  Goal: Prevent/manage excess moisture  Outcome: Progressing  Goal: Prevent/minimize sheer/friction injuries  Outcome: Progressing  Flowsheets (Taken 11/24/2023 2051)  Prevent/minimize sheer/friction injuries: HOB 30 degrees or less  Goal: Promote/optimize nutrition  Outcome: Progressing  Goal: Promote skin healing  Outcome: Progressing     Problem: Safety - Adult  Goal: Free from fall injury  Outcome: Progressing

## 2023-11-25 NOTE — PROGRESS NOTES
Subjective   Noel Aquino is a 78 y.o. male on hospital day 8 reports he is doing well.  He denies complaints.      Objective     Exam     Vitals:    11/24/23 2300 11/25/23 0300 11/25/23 0814 11/25/23 1107   BP: 104/64 126/75 112/60 152/67   BP Location:       Patient Position:       Pulse: 79 81 99 87   Resp: 20 20 16 17   Temp: 37 °C (98.6 °F) 37 °C (98.6 °F) 37.1 °C (98.8 °F) 37.3 °C (99.1 °F)   TempSrc:       SpO2: 99%  98% 97%   Weight:       Height:          Intake/Output last 3 shifts:  I/O last 3 completed shifts:  In: - (0 mL/kg)   Out: 1800 (25.6 mL/kg) [Urine:1800 (0.7 mL/kg/hr)]  Weight: 70.3 kg     Physical Exam  Vitals reviewed.   Constitutional:       General: He is not in acute distress.     Comments: Patient jumps when I touch him with my stethoscope stating it is cold.  Patient did the same thing yesterday in a dramatic fashion   HENT:      Head: Normocephalic and atraumatic.      Mouth/Throat:      Mouth: Mucous membranes are moist.   Cardiovascular:      Rate and Rhythm: Normal rate and regular rhythm.      Pulses: Normal pulses.      Heart sounds: Murmur (2 out of 6 greatest at the right upper sternal border) heard.      No friction rub. No gallop.   Pulmonary:      Breath sounds: No wheezing.      Comments: Patient with some upper airway noises.  Somewhat diminished at left base  Abdominal:      General: Bowel sounds are normal. There is no distension.      Comments: Did not appear tender using stethoscope to palpate while I auscultated   Musculoskeletal:      Right lower leg: No edema.      Left lower leg: No edema.   Skin:     General: Skin is warm and dry.   Neurological:      General: No focal deficit present.      Mental Status: He is alert.   Psychiatric:      Comments: Patient was relatively quiet but his interaction was unremarkable.            Medications   amLODIPine, 5 mg, oral, Daily  bisacodyl, 10 mg, rectal, Daily  [START ON 11/26/2023] ferrous sulfate (325 mg ferrous  "sulfate), 65 mg of iron, oral, Daily with breakfast  tiotropium, 2 Inhalation, inhalation, Daily   And  formoterol, 20 mcg, nebulization, q12h  heparin (porcine), 5,000 Units, subcutaneous, q8h  LORazepam, 0.5 mg, intravenous, Once  pantoprazole, 40 mg, oral, BID AC  polyethylene glycol, 17 g, oral, Daily  sennosides, 1 tablet, oral, BID  tamsulosin, 0.4 mg, oral, Nightly       PRN medications: acetaminophen, HYDROmorphone, hydrOXYzine HCL, ipratropium-albuteroL, ondansetron, polyethylene glycol       Labs     All new labs reviewed:  some of the basic labs as follows -     Results from last 7 days   Lab Units 11/25/23  1144 11/22/23  0742 11/21/23  0657 11/20/23  0843 11/19/23  0636   WBC AUTO x10*3/uL 11.8* 9.7 9.8 11.2 10.5   HEMOGLOBIN g/dL 7.9* 8.5* 8.7* 8.6* 9.0*   HEMATOCRIT % 28.9* 30.4* 31.9* 31.1* 31.9*   PLATELETS AUTO x10*3/uL 615* 628* 629* 672* 677*   NEUTROS PCT AUTO % 82.8  --   --  80.4 85.2   LYMPHO PCT MAN %  --  9.6 2.6  --   --    LYMPHS PCT AUTO % 9.8  --   --  11.5 7.8   MONO PCT MAN %  --  1.7 2.6  --   --    MONOS PCT AUTO % 5.3  --   --  5.6 4.9   EOSINO PCT MAN %  --  2.6 1.8  --   --    EOS PCT AUTO % 0.8  --   --  1.2 0.9            Results from last 72 hours   Lab Units 11/25/23  1144   SODIUM mmol/L 135*   POTASSIUM mmol/L 4.5   CHLORIDE mmol/L 105   CO2 mmol/L 20*   BUN mg/dL 28*   CREATININE mg/dL 1.77*       Results from last 72 hours   Lab Units 11/25/23  1144   ALBUMIN g/dL 2.5*       Results from last 72 hours   Lab Units 11/25/23  1144   GLUCOSE mg/dL 115*           No results found for: \"TR1\"  Lab Results   Component Value Date    URINECULTURE No significant growth 11/17/2023            Imaging   MR brain w and wo IV contrast  Narrative: Interpreted By:  Wilfredo Bishop,   STUDY:  MR BRAIN W AND WO IV CONTRAST;  11/24/2023 11:29 am      INDICATION:  Signs/Symptoms: lung cancer staging.      COMPARISON:  Limited brain MRIs, 11/21/2023 and 11/19/2023 and head CT, 07/02/2023    "   ACCESSION NUMBER(S):  [id number]      ORDERING CLINICIAN:  PARAG CANALES      TECHNIQUE:  Axial T2, FLAIR, DWI, gradient echo T2 and T1 weighted images of the  brain were acquired. Post contrast T1 weighted images were acquired  after administration of 14 mL Dotarem gadolinium based intravenous  contrast. Image quality is somewhat degraded by motion.      FINDINGS:  CSF Spaces: The ventricles, sulci and basal cisterns are appropriate  for the degree of volume loss. No abnormal extra-axial collection      Parenchyma: There is no restricted diffusion to suggest acute  infarction.  Generalized parenchymal volume loss is unchanged.  Nonspecific T2 hyperintense signal in the white matter is similar to  previous. Small foci of blooming on gradient echo images in the  medial left parietal and occipital lobes are similar to previous and  consistent with chronic hemosiderin deposition. No acute intracranial  hemorrhage. There is no mass effect or midline shift. Postcontrast  images are somewhat degraded by motion, however within this  limitation no abnormal enhancement is identified.      Paranasal Sinuses and Mastoids: Unremarkable.      Impression: No evidence of intracranial metastatic disease.      MACRO:  None      Signed by: Wilfredo Bishop 11/24/2023 11:48 AM  Dictation workstation:   [id number]         Assessment and Plan     Squamous cell lung cancer: Bone scan planned and repeat MRI with anesthesia shows no mets to the brain.  As noted above patient is clear that he would want treatment for his cancer for his wellbeing even if it was not curative.  Patient was very pleased to hear about the MRI results  -plan for outpatient PET scanning  -No plan for stenting open airways as left lower lobe deemed nonsalvageable by pulmonary  -No need for anticoagulation of tumor thrombus per vascular medicine recommendations.  They do recommend standard DVT prophylaxis  -Arrange for outpatient radiation oncology and medical  oncology follow-up    Hematemesis versus hemoptysis: Hemoglobin stable without any reports of gross blood loss.  No reports of any bleeding this past week  -Appreciate GI input.  If patient has signs of massive GI bleed will alert on-call GI service  -Continue PPI twice daily  -Monitoring hemoglobin  -Should patient have massive hemoptysis we will utilize inhaled TXA    COPD:  -Continue inhalers/nebs  -Incentive spirometer every hour while awake doing approximately 10 times per hour    GI:  -Bowel care.      Cardiovascular: Blood pressure overall at goal especially given his overall prognosis and age  -Currently holding home amlodipine.      Acute renal failure: Creatinine appears near baseline now  -Trend renal function panel  -Avoid nephrotoxic age    BPH:  -Continue Flomax    DVT prophylaxis    Physical therapy/Occupational Therapy.  Patient is actually agreeable to SNF placement at this time.  care coordinator to help assist arrangement.  At this time patient appears medically ready for discharge once skilled nursing facility bed is available and has clearance from insurance if needed    Yousif Mitchell MD     Of note the above was done with Dragon dictation system.  Note was proofread to minimize errors.

## 2023-11-25 NOTE — CARE PLAN
[facility] unable to reach patient yesterday for SNF choices.  I called the patient's room but no answer.  Asked bedside nurse to give patient my phone number to call me back.  UPDATE:  Patient did return my call.  He has no preference for a SNF but would prefer that we try and get one close to his home.  UPDATE:  [facility] sent to [facility], [facility], [facility] and [facility].

## 2023-11-26 PROCEDURE — 99232 SBSQ HOSP IP/OBS MODERATE 35: CPT | Performed by: INTERNAL MEDICINE

## 2023-11-26 PROCEDURE — 96372 THER/PROPH/DIAG INJ SC/IM: CPT

## 2023-11-26 PROCEDURE — 2500000004 HC RX 250 GENERAL PHARMACY W/ HCPCS (ALT 636 FOR OP/ED)

## 2023-11-26 PROCEDURE — 2500000002 HC RX 250 W HCPCS SELF ADMINISTERED DRUGS (ALT 637 FOR MEDICARE OP, ALT 636 FOR OP/ED)

## 2023-11-26 PROCEDURE — 94640 AIRWAY INHALATION TREATMENT: CPT

## 2023-11-26 PROCEDURE — 2500000001 HC RX 250 WO HCPCS SELF ADMINISTERED DRUGS (ALT 637 FOR MEDICARE OP)

## 2023-11-26 PROCEDURE — 1200000002 HC GENERAL ROOM WITH TELEMETRY DAILY

## 2023-11-26 RX ADMIN — SENNOSIDES 8.6 MG: 8.6 TABLET, FILM COATED ORAL at 20:16

## 2023-11-26 RX ADMIN — HEPARIN SODIUM 5000 UNITS: 5000 INJECTION INTRAVENOUS; SUBCUTANEOUS at 01:25

## 2023-11-26 RX ADMIN — FORMOTEROL FUMARATE DIHYDRATE 20 MCG: 20 SOLUTION RESPIRATORY (INHALATION) at 21:37

## 2023-11-26 RX ADMIN — PANTOPRAZOLE SODIUM 40 MG: 40 TABLET, DELAYED RELEASE ORAL at 06:51

## 2023-11-26 RX ADMIN — SENNOSIDES 8.6 MG: 8.6 TABLET, FILM COATED ORAL at 10:17

## 2023-11-26 RX ADMIN — FORMOTEROL FUMARATE DIHYDRATE 20 MCG: 20 SOLUTION RESPIRATORY (INHALATION) at 09:49

## 2023-11-26 RX ADMIN — POLYETHYLENE GLYCOL 3350 17 G: 17 POWDER, FOR SOLUTION ORAL at 10:18

## 2023-11-26 RX ADMIN — TAMSULOSIN HYDROCHLORIDE 0.4 MG: 0.4 CAPSULE ORAL at 20:15

## 2023-11-26 RX ADMIN — AMLODIPINE BESYLATE 5 MG: 5 TABLET ORAL at 10:17

## 2023-11-26 RX ADMIN — FERROUS SULFATE TAB 325 MG (65 MG ELEMENTAL FE) 1 TABLET: 325 (65 FE) TAB at 10:17

## 2023-11-26 ASSESSMENT — COGNITIVE AND FUNCTIONAL STATUS - GENERAL
HELP NEEDED FOR BATHING: A LITTLE
DAILY ACTIVITIY SCORE: 21
DRESSING REGULAR UPPER BODY CLOTHING: A LITTLE
MOBILITY SCORE: 24
DRESSING REGULAR LOWER BODY CLOTHING: A LITTLE

## 2023-11-26 ASSESSMENT — PAIN SCALES - GENERAL: PAINLEVEL_OUTOF10: 0 - NO PAIN

## 2023-11-26 ASSESSMENT — PAIN - FUNCTIONAL ASSESSMENT: PAIN_FUNCTIONAL_ASSESSMENT: 0-10

## 2023-11-26 NOTE — CARE PLAN
The patient's goals for the shift include  cooperation with plan of care and medication administration.      The clinical goals for the shift include patient will remain free from falls during shift    Over the shift, the patient did not make progress toward the following goals. Barriers to progression include patient refusing some medications and assessment. Recommendations to address these barriers include pt to cooperate with staff for compliance w/care plan.     Yes

## 2023-11-26 NOTE — PROGRESS NOTES
Noel Aquino is a 78 y.o. male on day 9 of admission presenting with Anemia.    Subjective   Patient seen at the bedside this morning and upon entering room demands to be fed. He denies any complaints outside of hunger.     Objective   Weight: 70.3 kg (155 lb) (11/17/23 0333)    Daily Weight  11/17/23 : 70.3 kg (155 lb)    Last Recorded Vitals  Heart Rate:  []   Temp:  [36.3 °C (97.3 °F)-37.5 °C (99.5 °F)]   Resp:  [18-20]   BP: (108-134)/(67-78)   SpO2:  [98 %-100 %]      Constitutional:       General: He is not in acute distress. Laying in bed on his left side.     Appearance: Non-toxic but chronically ill appearing.  HENT:      Head: Normocephalic and atraumatic.   Eyes:      Extraocular Movements: Extraocular movements intact.      Pupils: Pupils are equal, round, and reactive to light.   Cardiovascular:      Rate and Rhythm: Normal rate. Rhythm irregular.      Pulses: Normal pulses.      Heart sounds: Murmur heard.      No friction rub. No gallop.   Pulmonary:      Effort: Pulmonary effort is normal. No respiratory distress.      Breath sounds: Upper airway wheezing with distant breath sounds   Abdominal:      General: Abdomen is flat.      Palpations: Abdomen is soft.      Tenderness: There is abdominal tenderness.      Comments: Diffusely tender but at baseline per patient, refuses further abdominal exams  Musculoskeletal:         General: No swelling or deformity.   Skin:     General: Skin is warm and dry.   Neurological:      Mental Status: He is alert. Odd affect     Intake/Output last 3 Shifts:  I/O last 3 completed shifts:  In: - (0 mL/kg)   Out: 600 (8.5 mL/kg) [Urine:600 (0.2 mL/kg/hr)]  Weight: 70.3 kg     Medications  Scheduled medications  amLODIPine, 5 mg, oral, Daily  bisacodyl, 10 mg, rectal, Daily  ferrous sulfate (325 mg ferrous sulfate), 65 mg of iron, oral, Daily with breakfast  tiotropium, 2 Inhalation, inhalation, Daily   And  formoterol, 20 mcg, nebulization, q12h  heparin  "(porcine), 5,000 Units, subcutaneous, q8h  LORazepam, 0.5 mg, intravenous, Once  pantoprazole, 40 mg, oral, BID AC  polyethylene glycol, 17 g, oral, Daily  sennosides, 1 tablet, oral, BID  tamsulosin, 0.4 mg, oral, Nightly      Continuous medications     PRN medications  PRN medications: acetaminophen, HYDROmorphone, hydrOXYzine HCL, ipratropium-albuteroL, ondansetron, polyethylene glycol     Relevant Results    Labs  Lab Results   Component Value Date    WBC 11.8 (H) 11/25/2023    HGB 7.9 (L) 11/25/2023    HCT 28.9 (L) 11/25/2023    MCV 66 (L) 11/25/2023     (H) 11/25/2023      Results from last 7 days   Lab Units 11/25/23  1144 11/22/23  0742 11/21/23  0657   SODIUM mmol/L 135* 139 138   POTASSIUM mmol/L 4.5 4.4 4.5   CHLORIDE mmol/L 105 106 106   CO2 mmol/L 20* 26 24   BUN mg/dL 28* 25* 21   CREATININE mg/dL 1.77* 1.76* 1.67*   CALCIUM mg/dL 9.2 9.7 9.8            No lab exists for component: \"LABALBU\"            Imaging  No new imaging     Assessment/Plan   Principal Problem:    Anemia    79 y/o M with PMHx of left upper lobe mass, 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 He was HDS though vitals were significant for tachycardia to 132 and labs showed Hb 5.9 on presentation which improved/incremented more than appropriately to 9 after 2u pRBC. He was also started on ceftriaxone. He has elevated Cr to 2.23 from his BL ~2, with BUN elevated to 29. CT angio AP was limited due to diffuse edema and abdominopelvic fluid but showed filling defect of the left pulmonary vein concerning for luminal thrombus though showed no active extravasation within GI tract. CT angio Chest showed interval increase in size of MESHA mass with mass effect on left main pulmonary artery, left interlobar artery, obliteration of left superior " pulmonary vein, rounded hypodense filling defect consisted with tumor thrombus. Pulmonology and GI were consulted, with no indication for for acute intervention. Pending staging studies (only outpatient remaining), SNF placement, and outpatient Radonc/Hemeonc appointments     11/26  - Yesterday with leukocytosis of 11.8, but patient declining further labs   - Pending SNF    #Dispo  #Poor Insight  - Capacity eval 11/22 limited by refusal to participate, though patient was consistent in his desire to be treated for cancer, defaulted to having capacity   - Ethics consulted - to assist with decision making should patient be found to lack capacity   - PT/OT recommending SNF which patient is agreeable to     #Leukocytosis  ::No localization symptoms and patient refusing further blood draws  - CTM CBC if patient willing     #Suspected Hemoptysis vs. 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   - Gastroenterology consulted: IV PPI BIDx14 -> PO PPI BIDx14 (-12/3) -> PO PPD daily, not good candidate for sedation/EGD iso below, will call if having signs of massive GI bleed  - Pulmonology consulted: unlikely hemoptysis, nebulized TXA  as temporizing measure for hemoptysis  - daily CBC  - goal Hgb>7 (has been stable since initial transfusions)     #MESHA mass, SCC, with mass effect  #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 Martinez  - Raquel consulted, staging studies needed to plan treatment, needs PET Scan as outpatient, planned for close follow-up within a week    #Sinus tachycardia, with PACs, improved   ::Suspect hypovolemia as underlying etiology, improved this hospitalization s/p IVF   - EKG showing sinus tachycardia with respiratory variation c/w sinus arrhythmia and frequent PACs   - CTM, get EKGs as needed re-assess volume status as needed    #Abdominal Pain, unclear etiology   #Constipation   ::CT angio AP: diffuse edema and abdominopelvic fluid, moderate stool burden  - received 2 days of ceftriaxone - will hold and monitor  - Tolerating regular diet   - Bowel Regimen (Miralax/Sennosides), will try Bisocodyl daily as well      #Pyuria   ::UA 11/17 appeared dirty, Ucx NGTD  - asymptomatic, hold ceftriaxone and monitor as above     #COPD  - Continue Stiolto  - Duonebs Q4H prn     #HTN  - c/w home Amlodipine 5     #BPH   - c/w home Tamsulosin      F : as needed  E: replete lytes prn,  N: Full Diet     DVT prophylaxis: heparin sq  GI prophylaxis: PPI BID per GI recs     Code Status: Full (discussed with patient at bedside upon admission)    Brian Barnett MD  Internal Medicine

## 2023-11-26 NOTE — PROGRESS NOTES
Subjective   Noel Aquino is a 78 y.o. male on hospital day 9 reports he is doing well.  He denies complaints.  No fevers/chills, no nausea/vomiting, no abdominal pain, no chest pain, no cough, no shortness of breath, no sore throat, no sinus pain, no diarrhea or constipation, no dysuria or urinary frequency, and no new rashes.  Patient states eating is going well having no problems        Objective     Exam     Vitals:    11/25/23 1955 11/25/23 2140 11/26/23 0813 11/26/23 1103   BP: 108/67  134/71 122/78   Pulse: 101  95 90   Resp: 18  19 20   Temp:   36.3 °C (97.3 °F) 36.3 °C (97.3 °F)   TempSrc:   Temporal Temporal   SpO2: 98% 99% 100% 100%   Weight:       Height:          Intake/Output last 3 shifts:  I/O last 3 completed shifts:  In: - (0 mL/kg)   Out: 600 (8.5 mL/kg) [Urine:600 (0.2 mL/kg/hr)]  Weight: 70.3 kg     Physical Exam  Vitals reviewed.   Constitutional:       General: He is not in acute distress.  HENT:      Head: Normocephalic and atraumatic.      Mouth/Throat:      Mouth: Mucous membranes are moist.   Cardiovascular:      Rate and Rhythm: Normal rate and regular rhythm.      Pulses: Normal pulses.      Heart sounds: Murmur (2 out of 6 greatest at the right upper sternal border) heard.      No friction rub. No gallop.   Pulmonary:      Breath sounds: No wheezing.      Comments: Patient with some upper airway noises.  Somewhat diminished at left base  Abdominal:      General: Bowel sounds are normal. There is no distension.      Comments: Gentle joint duction with my stethoscope was able to tolerate when I continued on.  Patient stated it was not tender   Musculoskeletal:      Right lower leg: No edema.      Left lower leg: No edema.   Skin:     General: Skin is warm and dry.   Neurological:      General: No focal deficit present.      Mental Status: He is alert.            Medications   amLODIPine, 5 mg, oral, Daily  bisacodyl, 10 mg, rectal, Daily  ferrous sulfate (325 mg ferrous sulfate), 65  "mg of iron, oral, Daily with breakfast  tiotropium, 2 Inhalation, inhalation, Daily   And  formoterol, 20 mcg, nebulization, q12h  heparin (porcine), 5,000 Units, subcutaneous, q8h  LORazepam, 0.5 mg, intravenous, Once  pantoprazole, 40 mg, oral, BID AC  polyethylene glycol, 17 g, oral, Daily  sennosides, 1 tablet, oral, BID  tamsulosin, 0.4 mg, oral, Nightly       PRN medications: acetaminophen, HYDROmorphone, hydrOXYzine HCL, ipratropium-albuteroL, ondansetron, polyethylene glycol       Labs     All new labs reviewed:  some of the basic labs as follows -     Results from last 7 days   Lab Units 11/25/23  1144 11/22/23  0742 11/21/23  0657 11/20/23  0843   WBC AUTO x10*3/uL 11.8* 9.7 9.8 11.2   HEMOGLOBIN g/dL 7.9* 8.5* 8.7* 8.6*   HEMATOCRIT % 28.9* 30.4* 31.9* 31.1*   PLATELETS AUTO x10*3/uL 615* 628* 629* 672*   NEUTROS PCT AUTO % 82.8  --   --  80.4   LYMPHO PCT MAN %  --  9.6 2.6  --    LYMPHS PCT AUTO % 9.8  --   --  11.5   MONO PCT MAN %  --  1.7 2.6  --    MONOS PCT AUTO % 5.3  --   --  5.6   EOSINO PCT MAN %  --  2.6 1.8  --    EOS PCT AUTO % 0.8  --   --  1.2            Results from last 72 hours   Lab Units 11/25/23  1144   SODIUM mmol/L 135*   POTASSIUM mmol/L 4.5   CHLORIDE mmol/L 105   CO2 mmol/L 20*   BUN mg/dL 28*   CREATININE mg/dL 1.77*       Results from last 72 hours   Lab Units 11/25/23  1144   ALBUMIN g/dL 2.5*       Results from last 72 hours   Lab Units 11/25/23  1144   GLUCOSE mg/dL 115*           No results found for: \"TR1\"  Lab Results   Component Value Date    URINECULTURE No significant growth 11/17/2023            Imaging          Assessment and Plan     Squamous cell lung cancer: Bone scan and repeat MRI with anesthesia shows no mets to the brain.  As noted above patient is clear that he would want treatment for his cancer for his wellbeing even if it was not curative.  Patient was very pleased to hear about the MRI results  -plan for outpatient PET scanning  -No plan for stenting " open airways as left lower lobe deemed nonsalvageable by pulmonary  -No need for anticoagulation of tumor thrombus per vascular medicine recommendations.  They do recommend standard DVT prophylaxis  -Arrange for outpatient radiation oncology and medical oncology follow-up    Hematemesis versus hemoptysis: Hemoglobin stable without any reports of gross blood loss.  No reports of any bleeding this past week  -Appreciate GI input.  If patient has signs of massive GI bleed will alert on-call GI service  -Continue PPI twice daily  -Monitoring hemoglobin  -Should patient have massive hemoptysis we will utilize inhaled TXA  -Repeat CBC to see hemoglobin and WBC count    COPD:  -Continue inhalers/nebs  -Incentive spirometer every hour while awake doing approximately 10 times per hour    GI:  -Bowel care.      Cardiovascular: Blood pressure overall at goal  -Currently holding home amlodipine.      Acute renal failure: Creatinine appears near baseline now  -Trend renal function panel  -Avoid nephrotoxic age    BPH:  -Continue Flomax    DVT prophylaxis    Physical therapy/Occupational Therapy.  Patient is actually agreeable to SNF placement at this time.  care coordinator to help assist arrangement.  At this time patient appears medically ready for discharge once skilled nursing facility bed is available and has clearance from insurance if needed    Based vital sign checks.  Patient is having vital sign checks overnight while sleeping.  Unless there is a clinical indication would not wake him during sleep for this    Yousif Mitchell MD     Of note the above was done with Dragon dictation system.  Note was proofread to minimize errors.

## 2023-11-26 NOTE — CARE PLAN
The patient's goals for the shift include      The clinical goals for the shift include patient will remain free from falls during shift    Goal met. Pt was free from injury during the shift. Pt had no injury during the shift. Most goals met during the shift.     Problem: Skin  Goal: Prevent/minimize sheer/friction injuries  11/25/2023 1955 by Francisca Donohue RN  Outcome: Adequate for Discharge  11/25/2023 1955 by Francisca Donohue RN  Outcome: Progressing  Goal: Promote skin healing  11/25/2023 1955 by Francisca Donohue RN  Outcome: Adequate for Discharge  11/25/2023 1955 by Francisca Donohue RN  Outcome: Progressing     Problem: COGNITION/SAFETY  Goal: Patient will be A&Ox3 using external memory strategies as needed.  Outcome: Adequate for Discharge

## 2023-11-26 NOTE — CARE PLAN
The clinical goals for the shift include patient to remain free of injury and falls      Problem: Fall/Injury  Goal: Not fall by end of shift  Outcome: Progressing  Goal: Be free from injury by end of the shift  Outcome: Progressing  Goal: Verbalize understanding of personal risk factors for fall in the hospital  Outcome: Progressing  Goal: Verbalize understanding of risk factor reduction measures to prevent injury from fall in the home  Outcome: Progressing  Goal: Use assistive devices by end of the shift  Outcome: Progressing  Goal: Pace activities to prevent fatigue by end of the shift  Outcome: Progressing     Problem: Pain  Goal: My pain/discomfort is manageable  Outcome: Progressing     Problem: Pain  Goal: My pain/discomfort is manageable  Outcome: Progressing     Problem: Safety  Goal: Patient will be injury free during hospitalization  Outcome: Progressing  Goal: I will remain free of falls  Outcome: Progressing     Problem: Daily Care  Goal: Daily care needs are met  Outcome: Progressing     Problem: Psychosocial Needs  Goal: Demonstrates ability to cope with hospitalization/illness  Outcome: Progressing  Goal: Collaborate with me, my family, and caregiver to identify my specific goals  Outcome: Progressing

## 2023-11-27 LAB
ALBUMIN SERPL BCP-MCNC: 2.8 G/DL (ref 3.4–5)
ANION GAP SERPL CALC-SCNC: 11 MMOL/L (ref 10–20)
BASOPHILS # BLD MANUAL: 0 X10*3/UL (ref 0–0.1)
BASOPHILS NFR BLD MANUAL: 0 %
BUN SERPL-MCNC: 23 MG/DL (ref 6–23)
CALCIUM SERPL-MCNC: 9.7 MG/DL (ref 8.6–10.6)
CHLORIDE SERPL-SCNC: 106 MMOL/L (ref 98–107)
CO2 SERPL-SCNC: 23 MMOL/L (ref 21–32)
CREAT SERPL-MCNC: 1.46 MG/DL (ref 0.5–1.3)
EOSINOPHIL # BLD MANUAL: 0.27 X10*3/UL (ref 0–0.4)
EOSINOPHIL NFR BLD MANUAL: 2.7 %
ERYTHROCYTE [DISTWIDTH] IN BLOOD BY AUTOMATED COUNT: 30.5 % (ref 11.5–14.5)
GFR SERPL CREATININE-BSD FRML MDRD: 50 ML/MIN/1.73M*2
GLUCOSE SERPL-MCNC: 100 MG/DL (ref 74–99)
HCT VFR BLD AUTO: 28.5 % (ref 41–52)
HGB BLD-MCNC: 7.8 G/DL (ref 13.5–17.5)
HYPOCHROMIA BLD QL SMEAR: ABNORMAL
IMM GRANULOCYTES # BLD AUTO: 0.04 X10*3/UL (ref 0–0.5)
IMM GRANULOCYTES NFR BLD AUTO: 0.4 % (ref 0–0.9)
LYMPHOCYTES # BLD MANUAL: 0.62 X10*3/UL (ref 0.8–3)
LYMPHOCYTES NFR BLD MANUAL: 6.2 %
MAGNESIUM SERPL-MCNC: 2.26 MG/DL (ref 1.6–2.4)
MCH RBC QN AUTO: 18.4 PG (ref 26–34)
MCHC RBC AUTO-ENTMCNC: 27.4 G/DL (ref 32–36)
MCV RBC AUTO: 67 FL (ref 80–100)
MONOCYTES # BLD MANUAL: 0 X10*3/UL (ref 0.05–0.8)
MONOCYTES NFR BLD MANUAL: 0 %
NEUTS SEG # BLD MANUAL: 9.11 X10*3/UL (ref 1.6–5)
NEUTS SEG NFR BLD MANUAL: 91.1 %
NRBC BLD-RTO: 0 /100 WBCS (ref 0–0)
PHOSPHATE SERPL-MCNC: 2.7 MG/DL (ref 2.5–4.9)
PLATELET # BLD AUTO: 532 X10*3/UL (ref 150–450)
POTASSIUM SERPL-SCNC: 4.8 MMOL/L (ref 3.5–5.3)
RBC # BLD AUTO: 4.23 X10*6/UL (ref 4.5–5.9)
RBC MORPH BLD: ABNORMAL
SODIUM SERPL-SCNC: 135 MMOL/L (ref 136–145)
TARGETS BLD QL SMEAR: ABNORMAL
TOTAL CELLS COUNTED BLD: 112
WBC # BLD AUTO: 10 X10*3/UL (ref 4.4–11.3)

## 2023-11-27 PROCEDURE — 83735 ASSAY OF MAGNESIUM: CPT

## 2023-11-27 PROCEDURE — 2500000002 HC RX 250 W HCPCS SELF ADMINISTERED DRUGS (ALT 637 FOR MEDICARE OP, ALT 636 FOR OP/ED)

## 2023-11-27 PROCEDURE — 85007 BL SMEAR W/DIFF WBC COUNT: CPT

## 2023-11-27 PROCEDURE — 85027 COMPLETE CBC AUTOMATED: CPT

## 2023-11-27 PROCEDURE — 2500000004 HC RX 250 GENERAL PHARMACY W/ HCPCS (ALT 636 FOR OP/ED)

## 2023-11-27 PROCEDURE — 94640 AIRWAY INHALATION TREATMENT: CPT

## 2023-11-27 PROCEDURE — 36415 COLL VENOUS BLD VENIPUNCTURE: CPT

## 2023-11-27 PROCEDURE — 99232 SBSQ HOSP IP/OBS MODERATE 35: CPT | Performed by: INTERNAL MEDICINE

## 2023-11-27 PROCEDURE — 2500000001 HC RX 250 WO HCPCS SELF ADMINISTERED DRUGS (ALT 637 FOR MEDICARE OP)

## 2023-11-27 PROCEDURE — 80069 RENAL FUNCTION PANEL: CPT

## 2023-11-27 PROCEDURE — 1200000002 HC GENERAL ROOM WITH TELEMETRY DAILY

## 2023-11-27 RX ADMIN — SENNOSIDES 8.6 MG: 8.6 TABLET, FILM COATED ORAL at 10:00

## 2023-11-27 RX ADMIN — FERROUS SULFATE TAB 325 MG (65 MG ELEMENTAL FE) 1 TABLET: 325 (65 FE) TAB at 10:00

## 2023-11-27 RX ADMIN — FORMOTEROL FUMARATE DIHYDRATE 20 MCG: 20 SOLUTION RESPIRATORY (INHALATION) at 20:58

## 2023-11-27 RX ADMIN — PANTOPRAZOLE SODIUM 40 MG: 40 TABLET, DELAYED RELEASE ORAL at 06:16

## 2023-11-27 RX ADMIN — AMLODIPINE BESYLATE 5 MG: 5 TABLET ORAL at 10:00

## 2023-11-27 RX ADMIN — PANTOPRAZOLE SODIUM 40 MG: 40 TABLET, DELAYED RELEASE ORAL at 15:40

## 2023-11-27 ASSESSMENT — PAIN SCALES - WONG BAKER: WONGBAKER_NUMERICALRESPONSE: NO HURT

## 2023-11-27 ASSESSMENT — PAIN SCALES - GENERAL: PAINLEVEL_OUTOF10: 0 - NO PAIN

## 2023-11-27 NOTE — PROGRESS NOTES
Subjective   Noel Aquino is a 78 y.o. male on hospital day 10 reports he is doing well.  He denies complaints.     Objective     Exam     Vitals:    11/26/23 2024 11/26/23 2137 11/27/23 0453 11/27/23 0749   BP: 120/70  135/88 118/64   Pulse: 100  88 83   Resp: 18  22 16   Temp: 37 °C (98.6 °F)  37.1 °C (98.8 °F) 36.6 °C (97.9 °F)   TempSrc:    Temporal   SpO2: 99% 98% 100% 100%   Weight:       Height:          Intake/Output last 3 shifts:  I/O last 3 completed shifts:  In: 470 (6.7 mL/kg) [P.O.:470]  Out: 825 (11.7 mL/kg) [Urine:825 (0.3 mL/kg/hr)]  Weight: 70.3 kg     Physical Exam  Vitals reviewed.   Constitutional:       General: He is not in acute distress.  HENT:      Head: Normocephalic and atraumatic.      Mouth/Throat:      Mouth: Mucous membranes are moist.   Cardiovascular:      Rate and Rhythm: Normal rate and regular rhythm.      Pulses: Normal pulses.      Heart sounds: Murmur (2 out of 6 greatest at the right upper sternal border) heard.      No friction rub. No gallop.   Pulmonary:      Breath sounds: No wheezing.      Comments: Patient with some upper airway noises.  Somewhat diminished at left base  Abdominal:      General: Bowel sounds are normal. There is no distension.      Comments: Gentle palpation with my stethoscope was able to be tolerated   Musculoskeletal:      Right lower leg: No edema.      Left lower leg: No edema.   Skin:     General: Skin is warm and dry.   Neurological:      General: No focal deficit present.      Mental Status: He is alert.            Medications   amLODIPine, 5 mg, oral, Daily  bisacodyl, 10 mg, rectal, Daily  ferrous sulfate (325 mg ferrous sulfate), 65 mg of iron, oral, Daily with breakfast  tiotropium, 2 Inhalation, inhalation, Daily   And  formoterol, 20 mcg, nebulization, q12h  heparin (porcine), 5,000 Units, subcutaneous, q8h  pantoprazole, 40 mg, oral, BID AC  polyethylene glycol, 17 g, oral, Daily  sennosides, 1 tablet, oral, BID  tamsulosin, 0.4 mg,  "oral, Nightly       PRN medications: acetaminophen, HYDROmorphone, hydrOXYzine HCL, ipratropium-albuteroL, ondansetron, polyethylene glycol       Labs     All new labs reviewed:  some of the basic labs as follows -     Results from last 7 days   Lab Units 11/27/23  0953 11/25/23  1144 11/22/23  0742   WBC AUTO x10*3/uL 10.0 11.8* 9.7   HEMOGLOBIN g/dL 7.8* 7.9* 8.5*   HEMATOCRIT % 28.5* 28.9* 30.4*   PLATELETS AUTO x10*3/uL 532* 615* 628*   NEUTROS PCT AUTO %  --  82.8  --    LYMPHO PCT MAN % 6.2  --  9.6   LYMPHS PCT AUTO %  --  9.8  --    MONO PCT MAN % 0.0  --  1.7   MONOS PCT AUTO %  --  5.3  --    EOSINO PCT MAN % 2.7  --  2.6   EOS PCT AUTO %  --  0.8  --             Results from last 72 hours   Lab Units 11/27/23  0953 11/25/23  1144   SODIUM mmol/L 135* 135*   POTASSIUM mmol/L 4.8 4.5   CHLORIDE mmol/L 106 105   CO2 mmol/L 23 20*   BUN mg/dL 23 28*   CREATININE mg/dL 1.46* 1.77*       Results from last 72 hours   Lab Units 11/27/23  0953 11/25/23  1144   ALBUMIN g/dL 2.8* 2.5*       Results from last 72 hours   Lab Units 11/27/23  0953 11/25/23  1144   GLUCOSE mg/dL 100* 115*           No results found for: \"TR1\"  Lab Results   Component Value Date    URINECULTURE No significant growth 11/17/2023            Imaging          Assessment and Plan     Squamous cell lung cancer: Bone scan and repeat MRI with anesthesia shows no mets to the brain.  As noted above patient is clear that he would want treatment for his cancer for his wellbeing even if it was not curative.  Patient was very pleased to hear about the MRI results  -plan for outpatient PET scanning  -No plan for stenting open airways as left lower lobe deemed nonsalvageable by pulmonary  -No need for anticoagulation of tumor thrombus per vascular medicine recommendations.  They do recommend standard DVT prophylaxis  -Arrange for outpatient radiation oncology and medical oncology follow-up    Hematemesis versus hemoptysis: Hemoglobin stable without any " reports of gross blood loss.  No reports of any bleeding this past week  -Appreciate GI input.  If patient has signs of massive GI bleed will alert on-call GI service  -Continue PPI twice daily  -Monitoring hemoglobin  -Should patient have massive hemoptysis we will utilize inhaled TXA    Leukocytosis: Resolved and platelets normalizing suggesting decreasing inflammation  -Can monitor for signs of infection.  Currently doing okay.  Patient aware of what to monitor for    COPD:  -Continue inhalers/nebs  -Incentive spirometer every hour while awake doing approximately 10 times per hour    GI:  -Bowel care.      Cardiovascular: Blood pressure overall at goal  -Can continue amlodipine.  Appears to be tolerating okay    Acute renal failure: Creatinine appears at/below baseline now  -Trend renal function panel  -Avoid nephrotoxic age    BPH:  -Continue Flomax    Endocrine: May have slight hypothyroidism.  -Repeat TFTs in approximately 4 weeks as outpatient when not getting over acute illness    DVT prophylaxis    Physical therapy/Occupational Therapy.  Patient is actually agreeable to SNF placement at this time.  care coordinator to help assist arrangement.  At this time patient appears medically ready for discharge once skilled nursing facility bed is available and has clearance from insurance if needed    Yousif Mitchell MD     Of note the above was done with Dragon dictation system.  Note was proofread to minimize errors.

## 2023-11-27 NOTE — CARE PLAN
Per resident medically ready for SNF.  Several SNFs asking questions. Resident answered all questions from SNFs and they were notified. UPDATE: 2:05PM Received phone call from Sweetie Rivas, Clinical Liasion, for [facility].  Patient has been accepted.  Will need PT/OT and precert. Bedside nurse to ask P. T.. to see patient again.

## 2023-11-27 NOTE — PROGRESS NOTES
"Physical Therapy                 Therapy Communication Note    Patient Name: Noel Aquino  MRN: [id number]  Today's Date: 11/27/2023     Discipline: Physical Therapy    Missed Visit Reason: Missed Visit Reason: Patient refused (Pt having blood drawn on arrival, attempted to encourage PT after, pt declined stating\" I ain't going no where\", encouraged bed level activities and exercises pt declined stating \"I ain't doing nothing\".)    Missed Time: Attempt    Sabina Rhoades PTA  Rehab Office: [phone]    "

## 2023-11-27 NOTE — CARE PLAN
The patient's goals for the shift include      The clinical goals for the shift include patient to remain free of injury and falls    Over the shift, the patient met all goals.

## 2023-11-28 PROCEDURE — 94640 AIRWAY INHALATION TREATMENT: CPT

## 2023-11-28 PROCEDURE — 97530 THERAPEUTIC ACTIVITIES: CPT | Mod: GP,CQ

## 2023-11-28 PROCEDURE — 2500000001 HC RX 250 WO HCPCS SELF ADMINISTERED DRUGS (ALT 637 FOR MEDICARE OP)

## 2023-11-28 PROCEDURE — 2500000004 HC RX 250 GENERAL PHARMACY W/ HCPCS (ALT 636 FOR OP/ED)

## 2023-11-28 PROCEDURE — 99232 SBSQ HOSP IP/OBS MODERATE 35: CPT | Performed by: INTERNAL MEDICINE

## 2023-11-28 PROCEDURE — 1200000002 HC GENERAL ROOM WITH TELEMETRY DAILY

## 2023-11-28 PROCEDURE — 96372 THER/PROPH/DIAG INJ SC/IM: CPT

## 2023-11-28 PROCEDURE — 2500000002 HC RX 250 W HCPCS SELF ADMINISTERED DRUGS (ALT 637 FOR MEDICARE OP, ALT 636 FOR OP/ED)

## 2023-11-28 RX ADMIN — TIOTROPIUM BROMIDE INHALATION SPRAY 2 PUFF: 3.12 SPRAY, METERED RESPIRATORY (INHALATION) at 11:01

## 2023-11-28 RX ADMIN — FORMOTEROL FUMARATE DIHYDRATE 20 MCG: 20 SOLUTION RESPIRATORY (INHALATION) at 11:01

## 2023-11-28 RX ADMIN — FORMOTEROL FUMARATE DIHYDRATE 20 MCG: 20 SOLUTION RESPIRATORY (INHALATION) at 20:06

## 2023-11-28 RX ADMIN — SENNOSIDES 8.6 MG: 8.6 TABLET, FILM COATED ORAL at 10:37

## 2023-11-28 ASSESSMENT — COGNITIVE AND FUNCTIONAL STATUS - GENERAL
WALKING IN HOSPITAL ROOM: A LOT
CLIMB 3 TO 5 STEPS WITH RAILING: A LOT
STANDING UP FROM CHAIR USING ARMS: A LITTLE
MOVING TO AND FROM BED TO CHAIR: A LITTLE
MOVING FROM LYING ON BACK TO SITTING ON SIDE OF FLAT BED WITH BEDRAILS: A LITTLE
TURNING FROM BACK TO SIDE WHILE IN FLAT BAD: A LITTLE
MOBILITY SCORE: 18
MOBILITY SCORE: 14
MOVING TO AND FROM BED TO CHAIR: A LOT
CLIMB 3 TO 5 STEPS WITH RAILING: A LOT
WALKING IN HOSPITAL ROOM: A LOT
STANDING UP FROM CHAIR USING ARMS: A LOT

## 2023-11-28 ASSESSMENT — PAIN - FUNCTIONAL ASSESSMENT
PAIN_FUNCTIONAL_ASSESSMENT: 0-10
PAIN_FUNCTIONAL_ASSESSMENT: 0-10

## 2023-11-28 ASSESSMENT — PAIN SCALES - WONG BAKER: WONGBAKER_NUMERICALRESPONSE: NO HURT

## 2023-11-28 ASSESSMENT — PAIN SCALES - GENERAL
PAINLEVEL_OUTOF10: 0 - NO PAIN

## 2023-11-28 NOTE — PROGRESS NOTES
"Physical Therapy    Physical Therapy Treatment    Patient Name: Noel Aquino  MRN: [id number]  Today's Date: 11/28/2023  Time Calculation  Start Time: 1238  Stop Time: 1251  Time Calculation (min): 13 min       Assessment/Plan   PT Assessment  End of Session Communication: Bedside nurse, Physician  Assessment Comment: Pt continues to remain appropriate for moderate intensity PT upon D/C from hospital.  End of Session Patient Position: Bed, 3 rail up, Alarm off, not on at start of session  PT Plan  Inpatient/Swing Bed or Outpatient: Inpatient  PT Plan  Treatment/Interventions: Bed mobility, Transfer training, Gait training, Stair training, Balance training, Strengthening, Therapeutic exercise, Therapeutic activity, Home exercise program  PT Plan: Skilled PT  PT Frequency: 3 times per week  PT Discharge Recommendations: Moderate intensity level of continued care  PT - OK to Discharge: Yes ((Eval complete and d/c recommendation made))      General Visit Information:   PT  Visit  PT Received On: 11/28/23  General  Missed Visit: Yes  Missed Visit Reason: Patient refused (Pt declined to particpate in therapy, multiple attempts at encouragement provided, RN at bedside as well providing encouragement. Pt continued to refuse states \"I am cold, and I am not doing anything\". Will re-attempt as time permits.)  Prior to Session Communication: Bedside nurse, Physician  Patient Position Received: Bed, 3 rail up, Alarm off, not on at start of session  General Comment: Pt supine in bed, reports being agreeable to work with PT, however throughout session stating 'I'm cold, and what do you want me to do\".    Subjective   Precautions:  Precautions  Medical Precautions: Fall precautions  Vital Signs:       Objective   Pain:  Pain Assessment  Pain Assessment: 0-10  Pain Score: 0 - No pain  Cognition:       Treatments:  Therapeutic Activity  Therapeutic Activity Performed: Yes  Therapeutic Activity 1: Pt tolerated unsupported long " "sitting in bed ~2-3 minutes. Increased encouragement to progress to sitting EOB or transfer to chair however pt overall refused.    Bed Mobility  Bed Mobility: Yes  Bed Mobility 1  Bed Mobility 1: Rolling left  Level of Assistance 1: Close supervision, Moderate verbal cues  Bed Mobility Comments 1: Cues for task sequencing, increased encouragement to  roll onto back. Pt continuely stating \"i'm cold\".  Bed Mobility 2  Bed Mobility  2: Scooting  Level of Assistance 2: Close supervision, Minimal verbal cues  Bed Mobility Comments 2: Cues for use of BUE to scoot up in bed for upright sitting    Ambulation/Gait Training  Ambulation/Gait Training Performed: No    Transfers  Transfer: No (Attempts at encouraging pt to sit upright in bedside chair for lunch. Pt agreeable initially however declined.)    Outcome Measures:  [facility] Basic Mobility  Turning from your back to your side while in a flat bed without using bedrails: A little  Moving from lying on your back to sitting on the side of a flat bed without using bedrails: A little  Moving to and from bed to chair (including a wheelchair): A lot  Standing up from a chair using your arms (e.g. wheelchair or bedside chair): A lot  To walk in hospital room: A lot  Climbing 3-5 steps with railing: A lot  Basic Mobility - Total Score: 14    Education Documentation  Education on participating in therapy., taught by Sabina Rhoades PTA at 11/28/2023  1:04 PM.  Learner: Patient  Readiness: Nonacceptance  Method: Explanation  Response: No Evidence of Learning  Comment: Increased time spend educating pt on importance of working with therapy.    Mobility Training, taught by Sabina Rhoades PTA at 11/28/2023  1:03 PM.  Learner: Patient  Readiness: Nonacceptance  Method: Explanation  Response: No Evidence of Learning    Education Comments  No comments found.        OP EDUCATION:       Encounter Problems       Encounter Problems (Active)       Balance       Pt will complete dynamic " reaching outside BENJIE in standing x5 reps, without LOB (Progressing)       Start:  11/22/23    Expected End:  12/06/23               Mobility       Pt will amb 150ft with LRAD and supervision (Progressing)       Start:  11/22/23    Expected End:  12/06/23               Transfers       Pt will perform sit<>stand with LRAD mod I (Progressing)       Start:  11/22/23    Expected End:  12/06/23            Pt will complete supine<>sit mod I (Progressing)       Start:  11/22/23    Expected End:  12/06/23                 Sabina Rhoades [facility]  Rehab Office [phone]

## 2023-11-28 NOTE — PROGRESS NOTES
Subjective   Noel Aquino is a 78 y.o. male on hospital day 11 reports he is doing well and without needs.  He denies complaints.     Objective     Exam     Vitals:    11/28/23 0013 11/28/23 0900 11/28/23 1101 11/28/23 1509   BP: 109/63 104/55  111/74   Pulse:    105   Resp: 22  18 18   Temp: 36.6 °C (97.9 °F) 36.7 °C (98.1 °F)     TempSrc:       SpO2:   98% 99%   Weight:       Height:          Intake/Output last 3 shifts:  I/O last 3 completed shifts:  In: - (0 mL/kg)   Out: 1925 (27.4 mL/kg) [Urine:1925 (0.8 mL/kg/hr)]  Weight: 70.3 kg     Physical Exam  Vitals reviewed.   Constitutional:       General: He is not in acute distress.     Comments: Patient jumped when my stethoscope make contact with his chest wall despite trying to warm up my stethoscope saying it was cold and did not want me touching him.  He did permit limited exam still   HENT:      Head: Normocephalic and atraumatic.      Mouth/Throat:      Mouth: Mucous membranes are moist.   Cardiovascular:      Rate and Rhythm: Normal rate and regular rhythm.      Pulses: Normal pulses.      Heart sounds: Murmur (2 out of 6 greatest at the right upper sternal border) heard.      No friction rub. No gallop.   Pulmonary:      Breath sounds: No wheezing.      Comments: Patient with some upper airway noises.  Somewhat diminished at left base  Abdominal:      General: Bowel sounds are normal. There is no distension.      Comments: Gentle palpation with my stethoscope was able to be tolerated   Musculoskeletal:      Right lower leg: No edema.      Left lower leg: No edema.   Skin:     General: Skin is warm and dry.   Neurological:      General: No focal deficit present.      Mental Status: He is alert.            Medications   amLODIPine, 5 mg, oral, Daily  bisacodyl, 10 mg, rectal, Daily  ferrous sulfate (325 mg ferrous sulfate), 65 mg of iron, oral, Daily with breakfast  tiotropium, 2 Inhalation, inhalation, Daily   And  formoterol, 20 mcg, nebulization,  "q12h  heparin (porcine), 5,000 Units, subcutaneous, q8h  pantoprazole, 40 mg, oral, BID AC  polyethylene glycol, 17 g, oral, Daily  sennosides, 1 tablet, oral, BID  tamsulosin, 0.4 mg, oral, Nightly       PRN medications: acetaminophen, HYDROmorphone, hydrOXYzine HCL, ipratropium-albuteroL, ondansetron, polyethylene glycol       Labs     All new labs reviewed:  some of the basic labs as follows -     Results from last 7 days   Lab Units 11/27/23  0953 11/25/23  1144 11/22/23  0742   WBC AUTO x10*3/uL 10.0 11.8* 9.7   HEMOGLOBIN g/dL 7.8* 7.9* 8.5*   HEMATOCRIT % 28.5* 28.9* 30.4*   PLATELETS AUTO x10*3/uL 532* 615* 628*   NEUTROS PCT AUTO %  --  82.8  --    LYMPHO PCT MAN % 6.2  --  9.6   LYMPHS PCT AUTO %  --  9.8  --    MONO PCT MAN % 0.0  --  1.7   MONOS PCT AUTO %  --  5.3  --    EOSINO PCT MAN % 2.7  --  2.6   EOS PCT AUTO %  --  0.8  --             Results from last 72 hours   Lab Units 11/27/23  0953   SODIUM mmol/L 135*   POTASSIUM mmol/L 4.8   CHLORIDE mmol/L 106   CO2 mmol/L 23   BUN mg/dL 23   CREATININE mg/dL 1.46*       Results from last 72 hours   Lab Units 11/27/23  0953   ALBUMIN g/dL 2.8*       Results from last 72 hours   Lab Units 11/27/23  0953   GLUCOSE mg/dL 100*           No results found for: \"TR1\"  Lab Results   Component Value Date    URINECULTURE No significant growth 11/17/2023            Imaging          Assessment and Plan     Squamous cell lung cancer: Bone scan and repeat MRI with anesthesia shows no mets to the brain.  As noted above patient is clear that he would want treatment for his cancer for his wellbeing even if it was not curative.   -plan for outpatient PET scanning  -No plan for stenting open airways as left lower lobe deemed nonsalvageable by pulmonary  -No need for anticoagulation of tumor thrombus per vascular medicine recommendations.  They do recommend standard DVT prophylaxis  -Arrange for outpatient radiation oncology and medical oncology follow-up    Hematemesis " versus hemoptysis: Hemoglobin stable without any reports of gross blood loss.  No reports of any bleeding this past week  -Appreciate GI input.  If patient has signs of massive GI bleed will alert on-call GI service  -Continue PPI twice daily  -Monitoring hemoglobin  -Should patient have massive hemoptysis we will utilize inhaled TXA    Leukocytosis: Resolved on last check and platelets normalizing suggesting decreasing inflammation  -Can monitor for signs of infection.  Currently doing okay.  Patient aware of what to monitor for    COPD:  -Continue inhalers/nebs  -Incentive spirometer every hour while awake doing approximately 10 times per hour    GI:  -Bowel care.      Cardiovascular: Blood pressure overall at goal  -continue amlodipine.      Acute renal failure: Creatinine appears at/below baseline now  -Trend renal function panel  -Avoid nephrotoxic age    BPH:  -Continue Flomax    Endocrine: May have slight hypothyroidism.  -Repeat TFTs in approximately 4 weeks as outpatient when not getting over acute illness    DVT prophylaxis    Physical therapy/Occupational Therapy.  Patient is actually agreeable to SNF placement at this time.  care coordinator to help assist arrangement.  At this time patient appears medically ready for discharge.  Patient had refused to work with physical therapy yesterday but after I spoke with him he said he would be willing to work with them now.    Yousif Mitchell MD     Of note the above was done with Dragon dictation system.  Note was proofread to minimize errors.

## 2023-11-28 NOTE — PROGRESS NOTES
Patient accepted at [facility]. Unable to start precert without PT/OT recs. Requested PT/OT see patient today so we can start precert. RN requested to speak with patient to encourage participation with PT/OT. Teresa Billings RN, TCC    1114: PT attempted to work with patient with RN in room for encouragement. Patient refused. Notified medical team and facility. Teresa Billings RN, TCC    1328: Patient cooperated with PT visit. PT note sent to facility. Teresa Billings RN, TCC    1430: Auth started by [facility]. Teresa Billings RN, TCC    1550: Transportation confirmed for 12pm tomorrow (11/29) via stretcher by [facility] to [facility]. Facility notified. RN notified and provided report number.  and patient notified. Henriette completed per MD. 3200 form requested. Teresa Billings RN, TCC    1641: Patient's daughter, Alma, requested I call and speak with her regarding the discharge plans. Patient's daughter stated she wants to take patient home to [place] for home care. I explained I am unable to set up home care out of state as there wouldn't be a following PCP to order home care in [place]. Daughter verbalized understanding stating she will get her father an appointment with a new PCP in [place] this week to get home care ordered for PT/OT. Explained the difference between SNF and home care. Daughter verbalize understanding stating she is a nurse and was a PCA and can help her father at home. Discussed transportation home to [place], and daughter stated she would transport patient home to [place] in her own car. Spoke with patient on the phone. Discussed discharge options of going to [facility] or going home with daughter to [place]. Patient verbalized he would go home to [place] with daughter. Medical team notified. RN notified. Facility notified. Transportation canceled. Teresa Billings RN, TCC

## 2023-11-29 VITALS
HEIGHT: 69 IN | WEIGHT: 155 LBS | DIASTOLIC BLOOD PRESSURE: 78 MMHG | OXYGEN SATURATION: 95 % | HEART RATE: 68 BPM | TEMPERATURE: 97.8 F | SYSTOLIC BLOOD PRESSURE: 125 MMHG | BODY MASS INDEX: 22.96 KG/M2 | RESPIRATION RATE: 19 BRPM

## 2023-11-29 PROCEDURE — 99239 HOSP IP/OBS DSCHRG MGMT >30: CPT | Performed by: INTERNAL MEDICINE

## 2023-11-29 ASSESSMENT — COGNITIVE AND FUNCTIONAL STATUS - GENERAL
TOILETING: A LITTLE
DRESSING REGULAR UPPER BODY CLOTHING: A LITTLE
DAILY ACTIVITIY SCORE: 19
DRESSING REGULAR LOWER BODY CLOTHING: A LITTLE
PERSONAL GROOMING: A LITTLE
HELP NEEDED FOR BATHING: A LITTLE
MOBILITY SCORE: 20
CLIMB 3 TO 5 STEPS WITH RAILING: TOTAL
WALKING IN HOSPITAL ROOM: A LITTLE

## 2023-11-29 ASSESSMENT — PAIN SCALES - GENERAL: PAINLEVEL_OUTOF10: 0 - NO PAIN

## 2023-11-29 NOTE — PROGRESS NOTES
Subjective   Noel Aquino is a 78 y.o. male on hospital day 12 who apparently refused to go to skilled nursing facility today when attempts made to transport.  He has made change in plans and will go to live with his daughter in [place].  Apparently his daughter is en route to come get him as she is agreeable to this per report.  Patient himself did not want to talk today and asked me to leave him alone.  He denied pain or new problems by head-nod    Objective     Exam     Vitals:    11/28/23 2006 11/28/23 2300 11/29/23 0813 11/29/23 0837   BP:  135/61 125/78    BP Location:  Left arm     Patient Position:  Lying     Pulse:  69 68    Resp:  18 19    Temp:  36.2 °C (97.2 °F)  36.6 °C (97.8 °F)   TempSrc:  Temporal     SpO2: 99% 98% 95%    Weight:       Height:          Intake/Output last 3 shifts:  I/O last 3 completed shifts:  In: - (0 mL/kg)   Out: 650 (9.2 mL/kg) [Urine:650 (0.3 mL/kg/hr)]  Weight: 70.3 kg     Physical Exam  Vitals reviewed.   Constitutional:       General: He is not in acute distress.     Appearance: He is not toxic-appearing.      Comments: Somewhat withdrawn but was still interact and overall answers appeared appropriate although limited in scope   HENT:      Head: Normocephalic and atraumatic.   Cardiovascular:      Rate and Rhythm: Normal rate and regular rhythm.      Pulses: Normal pulses.      Heart sounds: Murmur (2 out of 6 greatest at the right upper sternal border) heard.      No friction rub. No gallop.   Pulmonary:      Breath sounds: No wheezing.      Comments: As per usual during exam we see an increase in upper airway noises which were not evident when he is at rest or in conversation.  Somewhat diminished at left base  Abdominal:      General: Bowel sounds are normal. There is no distension.      Comments: Gentle palpation with my stethoscope initially because patient jumped but then was able to be tolerated.  Patient denied pain when he jumped.  Patient has jumped on we  "have done other auscultations including over the lungs/back and chest   Musculoskeletal:      Right lower leg: No edema.      Left lower leg: No edema.   Skin:     General: Skin is warm and dry.   Neurological:      General: No focal deficit present.            Medications   amLODIPine, 5 mg, oral, Daily  bisacodyl, 10 mg, rectal, Daily  ferrous sulfate (325 mg ferrous sulfate), 65 mg of iron, oral, Daily with breakfast  tiotropium, 2 Inhalation, inhalation, Daily   And  formoterol, 20 mcg, nebulization, q12h  heparin (porcine), 5,000 Units, subcutaneous, q8h  pantoprazole, 40 mg, oral, BID AC  polyethylene glycol, 17 g, oral, Daily  sennosides, 1 tablet, oral, BID  tamsulosin, 0.4 mg, oral, Nightly       PRN medications: acetaminophen, HYDROmorphone, hydrOXYzine HCL, ipratropium-albuteroL, ondansetron, polyethylene glycol       Labs     All new labs reviewed:  some of the basic labs as follows -     Results from last 7 days   Lab Units 11/27/23  0953 11/25/23  1144   WBC AUTO x10*3/uL 10.0 11.8*   HEMOGLOBIN g/dL 7.8* 7.9*   HEMATOCRIT % 28.5* 28.9*   PLATELETS AUTO x10*3/uL 532* 615*   NEUTROS PCT AUTO %  --  82.8   LYMPHO PCT MAN % 6.2  --    LYMPHS PCT AUTO %  --  9.8   MONO PCT MAN % 0.0  --    MONOS PCT AUTO %  --  5.3   EOSINO PCT MAN % 2.7  --    EOS PCT AUTO %  --  0.8            Results from last 72 hours   Lab Units 11/27/23  0953   SODIUM mmol/L 135*   POTASSIUM mmol/L 4.8   CHLORIDE mmol/L 106   CO2 mmol/L 23   BUN mg/dL 23   CREATININE mg/dL 1.46*       Results from last 72 hours   Lab Units 11/27/23  0953   ALBUMIN g/dL 2.8*       Results from last 72 hours   Lab Units 11/27/23  0953   GLUCOSE mg/dL 100*           No results found for: \"TR1\"  Lab Results   Component Value Date    URINECULTURE No significant growth 11/17/2023            Imaging          Assessment and Plan     Squamous cell lung cancer: Bone scan and repeat MRI with anesthesia shows no mets to the brain.  As noted above patient is " clear that he would want treatment for his cancer for his wellbeing even if it was not curative.   -plan for outpatient PET scanning.  Will need to alert family that this was part of the plan.  Will need close outpatient oncology follow-up once he gets to [place], unless family opts to transport him back here for further testing and follow-up visits.  -No plan for stenting open airways as left lower lobe deemed nonsalvageable by pulmonary  -No need for anticoagulation of tumor thrombus per vascular medicine recommendations.  They do recommend standard DVT prophylaxis  -With last-minute change in disposition for patient and family will need to arrange for outpatient radiation oncology and medical oncology follow-up closer to home unless they do up to travel for visits here    Hematemesis versus hemoptysis: Hemoglobin stable on last check without any reports of gross blood loss.  No reports of any bleeding this past week  -Appreciate GI input.  If patient has signs of massive GI bleed will alert on-call GI service  -Continue PPI twice daily  -Monitoring hemoglobin  -Should patient have massive hemoptysis we will utilize inhaled TXA    Leukocytosis: Resolved on last check and platelets normalizing suggesting decreasing inflammation patient is at risk for postobstructive pneumonia.  -Can monitor for signs of infection.  Currently doing okay.  Patient aware of what symptoms to monitor for    COPD:  -Continue inhalers/nebs  -Incentive spirometer every hour while awake doing approximately 10 times per hour    GI:  -Bowel care.      Cardiovascular: Blood pressure overall at goal  -continue amlodipine.      Acute renal failure: Creatinine appears at/below baseline now on last check  -Avoid nephrotoxic age    BPH:  -Continue Flomax    Endocrine: May have slight hypothyroidism.  -Repeat TFTs in approximately 4 weeks as outpatient when not getting over acute illness    DVT prophylaxis    Physical therapy/Occupational Therapy.   Patient will be discharged into his daughter's care instead of skilled nursing facility which was arranged for per patient/family preference.  Patient will need to get plugged into the medical community near his home assuming he does not like to make the drive for all his visits and follow-up testing.  This includes need for radiation oncology, medical oncology, PET scan, PCP, outpatient blood work such as the TFTs.    Yousif Mitchell MD     Of note the above was done with Dragon dictation system.  Note was proofread to minimize errors.

## 2023-11-29 NOTE — CARE PLAN
The patient's goals for the shift include      The clinical goals for the shift include Patient will understand the importance of taking medications by end of shift    Over the shift, the patient did not make progress toward the following goals. Barriers to progression include patient AMS. Recommendations to address these barriers include educating the patient on the importance of taking medications.    Problem: Fall/Injury  Goal: Verbalize understanding of personal risk factors for fall in the hospital  Outcome: Not Progressing  Goal: Verbalize understanding of risk factor reduction measures to prevent injury from fall in the home  Outcome: Not Progressing  Goal: Use assistive devices by end of the shift  Outcome: Not Progressing

## 2023-11-29 NOTE — DISCHARGE INSTRUCTIONS
Dear Mr. Aquino,    You were hospitalized because of coughing up blood/throwing up blood and abdominal pain. Your CT chest scan showed enlarging mass from your lung cancer compressing nearby lung blood vessels and a blood clot in one of your lung blood vessels related to your lung cancer. You received a couple units of blood for low red blood cell counts. A team of doctors including GI, pulmonary, vascular medicine, medical oncology, and radiation oncology believed the blood was not coming from the lungs and were not able to place a stent to open the compressed vessels and airways. We decided to start an acid-reducing medication called pantoprazole to prevent further bleeding from possibly somewhere in your GI tract. We also obtained imaging including a nuclear bone scan and MRI brain showed no spread of cancer to your brain or bones. Once you became medically stable we originally planned to have you discharged to a skilled nursing facility with plan for outpatient PET scan and close follow up with oncologists to discuss radiation. However, based on your wishes to go home to [place] with your daughter, and based on the discussion with your care coordinator, it is important that you complete the following items as soon as possible after discharge.    - Please create [employer] account to access your medical records.  - Please continue to take pantoprazole 40 mg twice a day until 12/5/23 and then please take the pantoprazole 40 mg once a day.  - Please establish care with a primary care doctor within 1 week of discharge. Please also ask to repeat your thyroid function tests as you may have slight hypothyroidism.  - Please establish care with a medical oncologist and a radiation oncologist within 1-2 weeks of discharge. You will need an outpatient PET scan.

## 2023-11-29 NOTE — NURSING NOTE
Patient refused all medications throughout shift. Educated patient on importance of taking medications but patient became agitated and told this nurse to leave his room. MD notified. Per MD no further interventions at this time

## 2023-11-29 NOTE — PROGRESS NOTES
Per medical team, patient will discharge home today with daughter with no discharge needs. Medical team will review with daughter that patient will need PCP in [place] and which outpatient tests/procedures will be needed. Teresa Billings RN, TCC

## 2023-11-30 ENCOUNTER — DOCUMENTATION (OUTPATIENT)
Dept: ADMINISTRATIVE | Facility: CLINIC | Age: 78
End: 2023-11-30
Payer: MEDICARE

## 2023-11-30 NOTE — DISCHARGE SUMMARY
Discharge Diagnosis  Anemia    Issues Requiring Follow-Up  Outpatient PET scan  Outpatient PCP, med onc, rad onc    Test Results Pending At Discharge  Pending Labs       No current pending labs.            Hospital Course  Noel Aquino is a 78 y.o. male with PMHx of left upper lobe mass recently diagnosed in 2023 to be SCC, but lost to outpatient follow-up, PE, COPD, CKD, duodenal AV malformation, GI bleed, polysubstance use disorder (etoh, tobacco, cocaine),  JOSÉ MIGUEL, HTN, BPH presenting to [facility]  with a chief complaint of hematemesis vs. hemoptysis and abdominal pain. He is a very challenging and confrontation history and refuses to answer most questions, but these complaints appeared to be chronic.     ED Course:  Vitals on presentation: T 36.9 °C (98.5 °F)  HR (!) 132  /76  RR 15  O2 96 % None (Room air)   Initial laboratory evaluation showed:  CBC: WBC 10.2, Hb 5.9 (BL~7), Plt 616  CMP: Na 135, K 5.2 (hemolyzed), Cl 104, CO2 23, BUN elevated to 29, Cr 2.23 (BL~2), EGFR 30, Ca 9.6, Alb 2.7, AST 20, ALT 4, ALP 35, tBili 0.4, Mag 2.03  Lipase 42   VB.38/41/24.3/Lac1.9  EtOH <10  UDS positive for Cocain   UA with 3+ Leuk Esterace, WBC 11-20, Spec Grav 1.048    CT angio AP was limited due to edema and abdominopelvic fluid but showed no active extravasation within the GI tract, but did note heterogenous mass within left upper lobe and filling defect of left pulmonary vein.    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    Patient was provided with 2u pRBCs and given Zofran, Protonix, Diluadid 0.5mg. He was also given a dose of ceftriaxone due to initial concerns for SBP/UTI (later discontinued) before being admitted to medicine for further management.    GI was consulted and did not recommend endoscopic  evaluation due to risk for anesthesia and patient was started on a PPI. Pulmonology was consulted and did not feel the patient had hemoptysis based on his imaging and did not see any location amenable to bronchoscopic stenting. Vascular medicine was consulted and felt the thrombus seen was likely an extension of his tumor and patient did not need therapeutic anticoagulation. MedOnc was consulted and did not feel there was inpatient indication for intervention. RadOnc was consulted and recommended staging studies to determine if patient would need palliative radiation or definitive co-treatment with chemo/rad. Patient underwent NM bone scan with no evidence of bony disease. MRI brain was attempted twice, but patient was unable to tolerate it for unclear reasons and kept moving during the exams limiting diagnostic value. Furthermore, concerns were raised that the patient lacks capacity as he largely refused to participate in his care and when discussing his diagnosis of cancer or treatment, and would constantly demand food or just to be left alone. He further refused to identify any family members of CARLOS ALBERTO to contact. As a result, SW and ethics were engaged. However patient refused to participate in discussions, though was consistent in his desire to be treated for cancer and was defaulted as having capacity. As such, MRI brain with anesthesia was obtained (cleared by anesthesiology) which showed no evidence of mets to the brain. PT/OT recommended moderate intensity of continued care. As such, he was planned to be discharged in stable condition to SNF with plan for outpatient PET and close follow-up with HemeOnc (Dr. Martinez) and Radiation Oncology to discuss curative vs. Palliative radiation. However, per transition care coordinator,the patient declined to go to [facility], stating he wants to go home with his daughter to live in her home in [place]. TCC explained that we won't be able to set up home care as his PCP is  here in [place] and can't order home care for [place]. The patient was in agreement with the plan and the daughter is going to transport the patient by car to her home in [place].         Pertinent Physical Exam At Time of Discharge  Physical Exam  Constitutional:       General: He is not in acute distress.     Appearance: He is not toxic-appearing.   HENT:      Head: Atraumatic.      Mouth/Throat:      Mouth: Mucous membranes are dry.   Cardiovascular:      Rate and Rhythm: Normal rate and regular rhythm.      Pulses: Normal pulses.      Heart sounds: Murmur heard.   Pulmonary:      Effort: No respiratory distress.      Breath sounds: No wheezing.   Abdominal:      General: There is no distension.      Tenderness: There is no guarding.   Musculoskeletal:      Right lower leg: No edema.      Left lower leg: No edema.   Skin:     General: Skin is warm and dry.   Neurological:      General: No focal deficit present.      Mental Status: He is alert.       Home Medications     Medication List      CONTINUE taking these medications     albuterol 90 mcg/actuation inhaler; INHALE 2 PUFFS EVERY 6 HOURS AS   NEEDED FOR SHORTNESS OF BREATH   amLODIPine 5 mg tablet; Commonly known as: Norvasc; TAKE 1 TABLET BY   MOUTH ONCE DAILY   FeroSuL tablet; Generic drug: ferrous sulfate (325 mg ferrous sulfate);   TAKE 1 TABLET BY MOUTH ONCE DAILY   fluticasone 110 mcg/actuation inhaler; Commonly known as: Flovent   pantoprazole 40 mg EC tablet; Commonly known as: ProtoNix; TAKE 1 TABLET   BY MOUTH ONCE DAILY   Stiolto Respimat 2.5-2.5 mcg/actuation mist inhaler; Generic drug:   tiotropium-olodateroL; INHALE 2 PUFFS ONCE DAILY   tamsulosin 0.4 mg 24 hr capsule; Commonly known as: Flomax; TAKE 1   CAPSULE BY MOUTH EVERY NIGHT AT BEDTIME     STOP taking these medications     azithromycin 500 mg tablet; Commonly known as: Zithromax       Outpatient Follow-Up  Future Appointments   Date Time Provider Department Center   12/6/2023 11:00 AM  Cyndi Martinez MD [id number]   12/7/2023  2:00 PM Nando Gonzalez MD [id number]       Todd Rai MD

## 2023-11-30 NOTE — NURSING NOTE
Pt left unit at 1553 via transport. Nurse went over discharge with Pt and daughter. IV removed prior to discharge. Pt left with all belongings including clothes, cell phone and .  Francisca Donohue RN

## 2023-12-04 ENCOUNTER — TELEPHONE (OUTPATIENT)
Dept: RADIATION ONCOLOGY | Facility: HOSPITAL | Age: 78
End: 2023-12-04
Payer: MEDICARE

## 2023-12-06 ENCOUNTER — APPOINTMENT (OUTPATIENT)
Dept: HEMATOLOGY/ONCOLOGY | Facility: HOSPITAL | Age: 78
End: 2023-12-06
Payer: MEDICARE

## 2023-12-07 ENCOUNTER — APPOINTMENT (OUTPATIENT)
Dept: RADIATION ONCOLOGY | Facility: HOSPITAL | Age: 78
End: 2023-12-07
Payer: MEDICARE

## 2023-12-11 ENCOUNTER — DOCUMENTATION (OUTPATIENT)
Dept: ADMINISTRATIVE | Facility: CLINIC | Age: 78
End: 2023-12-11
Payer: MEDICARE